# Patient Record
Sex: FEMALE | Race: WHITE | NOT HISPANIC OR LATINO | Employment: OTHER | URBAN - NONMETROPOLITAN AREA
[De-identification: names, ages, dates, MRNs, and addresses within clinical notes are randomized per-mention and may not be internally consistent; named-entity substitution may affect disease eponyms.]

---

## 2017-02-28 ENCOUNTER — OFFICE VISIT (OUTPATIENT)
Dept: CARDIOLOGY | Facility: CLINIC | Age: 82
End: 2017-02-28

## 2017-02-28 VITALS
HEIGHT: 64 IN | HEART RATE: 64 BPM | SYSTOLIC BLOOD PRESSURE: 128 MMHG | OXYGEN SATURATION: 98 % | WEIGHT: 109 LBS | BODY MASS INDEX: 18.61 KG/M2 | DIASTOLIC BLOOD PRESSURE: 76 MMHG

## 2017-02-28 DIAGNOSIS — I65.23 BILATERAL CAROTID ARTERY STENOSIS: Primary | ICD-10-CM

## 2017-02-28 DIAGNOSIS — I10 ESSENTIAL HYPERTENSION: ICD-10-CM

## 2017-02-28 DIAGNOSIS — I45.3 TRIFASCICULAR BLOCK: ICD-10-CM

## 2017-02-28 PROCEDURE — 99213 OFFICE O/P EST LOW 20 MIN: CPT | Performed by: PHYSICIAN ASSISTANT

## 2017-02-28 NOTE — PROGRESS NOTES
"Problem list     Subjective   Kelly Uriostegui is a 81 y.o. female     Chief Complaint   Patient presents with   • Follow-up     2-3 mo. echo f/u       HPI  The patient present at our request to review echo and carotid duplex findings.  Echocardiogram indicated preserved systolic function with no significant valvular issues.  No potential source of cardioembolism was noted by that study.  Carotid duplex indicated possibly 50 to his most to 75% stenosis in the right internal carotid artery.  Retrograde flow is noted in the left vertebral artery.  We have seen the patient because of weak episodes in \"stumbling episodes previously.  She also had possible TIA symptoms.  We prompted her to do the above workup, in conjunction with her monitor.  Her monitor was basically unremarkable.  Her baseline EKG had indicated trifascicular block.  We decreased her beta blocker therapy.  Really she feels better since decreasing that.  She has no further complaints otherwise.  She specifically denies chest pain, failure, or further dysrhythmic symptoms.    Current Outpatient Prescriptions   Medication Sig Dispense Refill   • atenolol (TENORMIN) 50 MG tablet Take 1 tablet by mouth Daily. 30 tablet 3   • atorvastatin (LIPITOR) 40 MG tablet Take 1 tablet by mouth daily. 30 tablet 3   • Cholecalciferol (VITAMIN D3) 2000 UNITS tablet Take 1 tablet by mouth daily.     • clopidogrel (PLAVIX) 75 MG tablet Take 1 tablet by mouth daily. 30 tablet 3   • Coenzyme Q10 (COQ-10) 200 MG capsule Take 1 tablet by mouth daily. 30 each 3   • docusate sodium (COLACE) 100 MG capsule Take 100 mg by mouth 2 (two) times a day.     • fenofibrate (TRICOR) 48 MG tablet Take 1 tablet by mouth daily. 30 tablet 3   • ferrous sulfate 325 (65 FE) MG tablet Take 325 mg by mouth daily with breakfast.     • folic acid (FOLVITE) 1 MG tablet Take 1 mg by mouth daily.     • losartan (COZAAR) 50 MG tablet Take 1 tablet by mouth 2 (Two) Times a Day. 60 tablet 2   • Multiple " "Vitamins-Minerals (ICAPS) capsule Take 1 capsule by mouth 2 (two) times a day.     • pantoprazole (PROTONIX) 40 MG EC tablet Take 1 tablet by mouth daily.     • pentoxifylline (TRENTal) 400 MG CR tablet Take 400 mg by mouth 2 (two) times a day.     • triamterene-hydrochlorothiazide (MAXZIDE-25) 37.5-25 MG per tablet Take 1 tablet by mouth daily. 30 tablet 3   • aspirin 81 MG EC tablet Take 1 tablet by mouth daily. 30 tablet 3     No current facility-administered medications for this visit.        Kiwi extract and Penicillins    Past Medical History   Diagnosis Date   • Coronary artery disease    • Hyperlipidemia    • Hypertension        Social History     Social History   • Marital status: Unknown     Spouse name: N/A   • Number of children: N/A   • Years of education: N/A     Occupational History   • Not on file.     Social History Main Topics   • Smoking status: Former Smoker   • Smokeless tobacco: Never Used   • Alcohol use No   • Drug use: No   • Sexual activity: Defer     Other Topics Concern   • Not on file     Social History Narrative       Family History   Problem Relation Age of Onset   • Stroke Mother    • Hypertension Mother    • Hyperlipidemia Mother    • Heart disease Father    • Other Father      AAA       Review of Systems   Constitutional: Positive for fatigue.   HENT: Negative.    Eyes: Positive for visual disturbance (wears glasses).   Respiratory: Negative.    Cardiovascular: Negative.    Gastrointestinal: Negative.    Endocrine: Negative.    Genitourinary: Negative.    Musculoskeletal: Positive for arthralgias and myalgias.   Skin: Negative.    Allergic/Immunologic: Negative.    Neurological: Positive for dizziness.   Hematological: Bruises/bleeds easily.   Psychiatric/Behavioral: Negative.        Objective   Visit Vitals   • /76 (BP Location: Left arm, Patient Position: Sitting)   • Pulse 64   • Ht 64\" (162.6 cm)   • Wt 109 lb (49.4 kg)   • SpO2 98%   • BMI 18.71 kg/m2     Lab Results " (most recent)     None        Physical Exam   Constitutional: She is oriented to person, place, and time. She appears well-developed and well-nourished. No distress.   HENT:   Head: Normocephalic and atraumatic.   Eyes: EOM are normal. Pupils are equal, round, and reactive to light.   Neck: No JVD present. Carotid bruit is present (Bilat carotid bruits).   Cardiovascular: Normal rate and regular rhythm.  Exam reveals no gallop and no friction rub.    Murmur heard.   Systolic (Second RICS to LLSB) murmur is present with a grade of 2/6   Pulmonary/Chest: Effort normal and breath sounds normal. No respiratory distress. She has no wheezes. She has no rales. She exhibits no tenderness.   Musculoskeletal: Normal range of motion. She exhibits no edema.   Neurological: She is alert and oriented to person, place, and time. No cranial nerve deficit.   Skin: Skin is warm and dry. No rash noted. No erythema. No pallor.   Psychiatric: She has a normal mood and affect. Her behavior is normal.   Nursing note and vitals reviewed.        Procedure   Procedures       Assessment/Plan      Diagnosis Plan   1. Bilateral carotid artery stenosis  CT Angiogram Carotids    Basic Metabolic Panel    CT Angiogram Carotids    Basic Metabolic Panel   2. Trifascicular block  Basic Metabolic Panel    Basic Metabolic Panel   3. Essential hypertension  Basic Metabolic Panel    Basic Metabolic Panel       I would like to schedule for CTA of the carotids given prior TIA symptoms and significant disease the right internal carotid artery as outlined above and has questioned by carotid duplex.  She will need a BMP prior to that study.  We'll see her back after the above and recommend further to her at that time.  I have asked that she decreased the atenolol further from 50 mg a day to half a tab daily.  I'm concerned about prior symptoms which may represent bradycardia dysrhythmic issues and her trifascicular block by EKG.  She will monitor heart rate  and blood pressure, calling us for any issues.

## 2017-03-01 DIAGNOSIS — I10 ESSENTIAL HYPERTENSION: Primary | ICD-10-CM

## 2017-03-20 ENCOUNTER — TELEPHONE (OUTPATIENT)
Dept: CARDIOLOGY | Facility: CLINIC | Age: 82
End: 2017-03-20

## 2017-03-20 NOTE — TELEPHONE ENCOUNTER
----- Message from Lesia Roman sent at 3/20/2017  3:38 PM EDT -----  Pt had testing done on kidneys last week at [facility] and would like to know those results.

## 2017-03-20 NOTE — TELEPHONE ENCOUNTER
Briefly discussed CTA carotid results with patient. According to last office note patient was to f/u here at the office after CTA done. [facility]KE[facility] Cade staff scheduling appt. CHANELLE,LPN

## 2017-04-06 ENCOUNTER — OFFICE VISIT (OUTPATIENT)
Dept: CARDIOLOGY | Facility: CLINIC | Age: 82
End: 2017-04-06

## 2017-04-06 VITALS
BODY MASS INDEX: 18.88 KG/M2 | HEART RATE: 61 BPM | DIASTOLIC BLOOD PRESSURE: 58 MMHG | WEIGHT: 110.6 LBS | SYSTOLIC BLOOD PRESSURE: 118 MMHG | OXYGEN SATURATION: 97 % | HEIGHT: 64 IN

## 2017-04-06 DIAGNOSIS — I10 ESSENTIAL HYPERTENSION: ICD-10-CM

## 2017-04-06 DIAGNOSIS — I65.23 BILATERAL CAROTID ARTERY STENOSIS: Primary | ICD-10-CM

## 2017-04-06 DIAGNOSIS — I77.1 SUBCLAVIAN ARTERY STENOSIS, LEFT (HCC): ICD-10-CM

## 2017-04-06 PROCEDURE — 99213 OFFICE O/P EST LOW 20 MIN: CPT | Performed by: PHYSICIAN ASSISTANT

## 2017-04-06 NOTE — PROGRESS NOTES
Problem list     Subjective   Kelly Uriostegui is a 81 y.o. female     Chief Complaint   Patient presents with   • Follow-up     patient presents as a follow up       HPI  The patient presents back for evaluation today to follow-up on CTA of the carotids.  She was scheduled for that because of an abnormal carotid duplex.  CT of the carotids suggested 60% left carotid stenosis, 50% right carotid stenosis, left subclavian artery occlusion with reconstitution via the left vertebral artery, and ulcerative plaques noted in the aortic arch.  The patient initially had workup given TIA type symptoms.  Currently, the patient does have some degree of dizziness.  She has no syncope.  She has no significant left upper extremity claudication symptoms.  She relates to no chest pain currently.  She has chronic but stable dyspnea.  She relates to no PND or orthopnea.  She has no further complaints otherwise.    Current Outpatient Prescriptions   Medication Sig Dispense Refill   • aspirin 81 MG EC tablet Take 1 tablet by mouth daily. 30 tablet 3   • atenolol (TENORMIN) 50 MG tablet Take 1 tablet by mouth Daily. (Patient taking differently: Take 25 mg by mouth Daily. 1/2 tablet daily) 30 tablet 3   • atorvastatin (LIPITOR) 40 MG tablet Take 1 tablet by mouth daily. 30 tablet 3   • Cholecalciferol (VITAMIN D3) 2000 UNITS tablet Take 1 tablet by mouth daily.     • clopidogrel (PLAVIX) 75 MG tablet Take 1 tablet by mouth daily. 30 tablet 3   • Coenzyme Q10 (COQ-10) 200 MG capsule Take 1 tablet by mouth daily. 30 each 3   • docusate sodium (COLACE) 100 MG capsule Take 100 mg by mouth 2 (two) times a day.     • fenofibrate (TRICOR) 48 MG tablet Take 1 tablet by mouth daily. 30 tablet 3   • ferrous sulfate 325 (65 FE) MG tablet Take 325 mg by mouth daily with breakfast.     • folic acid (FOLVITE) 1 MG tablet Take 1 mg by mouth daily.     • losartan (COZAAR) 50 MG tablet Take 1 tablet by mouth 2 (Two) Times a Day. 60 tablet 2   • Multiple  "Vitamins-Minerals (ICAPS) capsule Take 1 capsule by mouth 2 (two) times a day.     • pantoprazole (PROTONIX) 40 MG EC tablet Take 1 tablet by mouth daily.     • pentoxifylline (TRENTal) 400 MG CR tablet Take 400 mg by mouth 2 (two) times a day.     • triamterene-hydrochlorothiazide (MAXZIDE-25) 37.5-25 MG per tablet Take 1 tablet by mouth daily. 30 tablet 3     No current facility-administered medications for this visit.        Kiwi extract and Penicillins    Past Medical History:   Diagnosis Date   • Coronary artery disease    • Hyperlipidemia    • Hypertension        Social History     Social History   • Marital status: Unknown     Spouse name: N/A   • Number of children: N/A   • Years of education: N/A     Occupational History   • Not on file.     Social History Main Topics   • Smoking status: Former Smoker   • Smokeless tobacco: Never Used   • Alcohol use No   • Drug use: No   • Sexual activity: Defer     Other Topics Concern   • Not on file     Social History Narrative       Family History   Problem Relation Age of Onset   • Stroke Mother    • Hypertension Mother    • Hyperlipidemia Mother    • Heart disease Father    • Other Father      AAA       Review of Systems   Constitutional: Positive for fatigue.   HENT: Positive for sinus pressure.    Eyes: Positive for visual disturbance (glasses ).   Respiratory: Negative.    Cardiovascular: Negative.    Gastrointestinal: Negative.    Endocrine: Negative.    Genitourinary: Negative.    Musculoskeletal: Positive for arthralgias and myalgias (leg went numb, pain in foot).   Skin: Negative.    Allergic/Immunologic: Positive for environmental allergies.   Neurological: Positive for numbness (in leg).   Hematological: Bruises/bleeds easily.   Psychiatric/Behavioral: The patient is nervous/anxious.        Objective   /58 (BP Location: Left arm, Patient Position: Sitting)  Pulse 61  Ht 64\" (162.6 cm)  Wt 110 lb 9.6 oz (50.2 kg)  SpO2 97%  BMI 18.98 kg/m2  Lab " Results (most recent)     None        Physical Exam   Constitutional: She is oriented to person, place, and time. She appears well-developed and well-nourished. No distress.   HENT:   Head: Normocephalic and atraumatic.   Eyes: EOM are normal. Pupils are equal, round, and reactive to light.   Neck: No JVD present. Carotid bruit is present (Bilat carotid bruits).   Cardiovascular: Normal rate and regular rhythm.  Exam reveals no gallop and no friction rub.    Murmur heard.   Systolic (Second RICS to LLSB) murmur is present with a grade of 2/6   Pulses:       Radial pulses are 1+ on the left side.   Pulmonary/Chest: Effort normal and breath sounds normal. No respiratory distress. She has no wheezes. She has no rales. She exhibits no tenderness.   Musculoskeletal: Normal range of motion. She exhibits no edema.   Neurological: She is alert and oriented to person, place, and time. No cranial nerve deficit.   Skin: Skin is warm and dry. No rash noted. No erythema. No pallor.   Psychiatric: She has a normal mood and affect. Her behavior is normal.   Nursing note and vitals reviewed.        Procedure   Procedures       Assessment/Plan      Diagnosis Plan   1. Bilateral carotid artery stenosis     2. Subclavian artery stenosis, left     3. Essential hypertension       The patient presents, as above, to follow-up on CTA of the carotids.  She had findings as above.  She has subclavian stenosis with reconstitution via the left vertebral artery, again all as above.  She has minimal symptoms of subclavian still at this time.  I will continue antiplatelet therapy and her lipid medications/statin.  We discussed consideration for CT surgery evaluation.  I advised do not feel that she has a target for intervention.  She would like to consider this nonetheless.  She will call back if she decides to pursue with that.  Further pending above.  Nothing further for now as we will treat her medically at this time.

## 2017-04-27 ENCOUNTER — TELEPHONE (OUTPATIENT)
Dept: CARDIOLOGY | Facility: CLINIC | Age: 82
End: 2017-04-27

## 2017-04-27 DIAGNOSIS — I65.23 BILATERAL CAROTID ARTERY STENOSIS: Primary | ICD-10-CM

## 2017-04-27 DIAGNOSIS — I77.1 SUBCLAVIAN ARTERY STENOSIS (HCC): ICD-10-CM

## 2017-04-27 NOTE — TELEPHONE ENCOUNTER
----- Message from Chelo Perez sent at 4/27/2017 11:17 AM EDT -----  Contact: PATIENT  THE PATIENT STATES TAYA TALKED TO HER ABOUT SEEING A SUGEON.  SHE DOESN'T REMEMBER WHO HE SUGGESTED AND WANTS TO MOVE FORWARD WITH THAT REFERRAL.  SHE CAN BE REACHED [phone].  THANKS      Patient desires to proceed with referral to Dr. Carranza in [place].

## 2017-05-03 ENCOUNTER — TRANSCRIBE ORDERS (OUTPATIENT)
Dept: CARDIAC SURGERY | Facility: CLINIC | Age: 82
End: 2017-05-03

## 2017-05-03 DIAGNOSIS — I65.23 CAROTID STENOSIS, BILATERAL: Primary | ICD-10-CM

## 2017-05-22 ENCOUNTER — OFFICE VISIT (OUTPATIENT)
Dept: CARDIAC SURGERY | Facility: CLINIC | Age: 82
End: 2017-05-22

## 2017-05-22 VITALS
BODY MASS INDEX: 18.44 KG/M2 | TEMPERATURE: 96.9 F | DIASTOLIC BLOOD PRESSURE: 90 MMHG | SYSTOLIC BLOOD PRESSURE: 120 MMHG | HEIGHT: 64 IN | WEIGHT: 108 LBS | OXYGEN SATURATION: 97 % | HEART RATE: 76 BPM

## 2017-05-22 DIAGNOSIS — I77.1 SUBCLAVIAN ARTERY STENOSIS, LEFT (HCC): Primary | ICD-10-CM

## 2017-05-22 DIAGNOSIS — I65.22 STENOSIS OF LEFT CAROTID ARTERY: ICD-10-CM

## 2017-05-22 PROCEDURE — 99205 OFFICE O/P NEW HI 60 MIN: CPT | Performed by: THORACIC SURGERY (CARDIOTHORACIC VASCULAR SURGERY)

## 2017-05-22 RX ORDER — LORATADINE 10 MG/1
10 TABLET ORAL DAILY
COMMUNITY

## 2017-06-26 ENCOUNTER — OFFICE VISIT (OUTPATIENT)
Dept: CARDIOLOGY | Facility: CLINIC | Age: 82
End: 2017-06-26

## 2017-06-26 VITALS
BODY MASS INDEX: 18.74 KG/M2 | HEIGHT: 64 IN | OXYGEN SATURATION: 97 % | DIASTOLIC BLOOD PRESSURE: 70 MMHG | HEART RATE: 72 BPM | WEIGHT: 109.8 LBS | SYSTOLIC BLOOD PRESSURE: 119 MMHG

## 2017-06-26 DIAGNOSIS — I77.1 SUBCLAVIAN ARTERY STENOSIS, LEFT (HCC): Primary | ICD-10-CM

## 2017-06-26 DIAGNOSIS — I10 ESSENTIAL HYPERTENSION: ICD-10-CM

## 2017-06-26 DIAGNOSIS — I65.22 STENOSIS OF LEFT CAROTID ARTERY: ICD-10-CM

## 2017-06-26 PROCEDURE — 99213 OFFICE O/P EST LOW 20 MIN: CPT | Performed by: PHYSICIAN ASSISTANT

## 2017-06-26 RX ORDER — RANITIDINE 150 MG/1
150 TABLET ORAL 2 TIMES DAILY
COMMUNITY
Start: 2017-05-24

## 2017-06-26 NOTE — PROGRESS NOTES
Problem list     Subjective   Kelly Uriostegui is a 82 y.o. female     Chief Complaint   Patient presents with   • Follow-up     patient appears in office today for follow up        HPI  The patient presents back for follow-up after follow-up of CT surgery evaluation.  She is scheduled for that because of left subclavian artery occlusion with subclavian steal presentation.  She had nonobstructive carotid artery stenosis (up to 60%) by CTA.  She did see CT surgery who felt that she was candidate for bypass of the left subclavian artery.  The patient was advised to call back should she decide to proceed with the same.  She does note that she is discussed this with her daughter and has decided to proceed on with revascularization as her symptoms of subclavian still are very significant.  The patient has no current chest pain.  She has stable dyspnea.  She relates to no PND orthopnea.  She has no further complaints otherwise.    Current Outpatient Prescriptions   Medication Sig Dispense Refill   • aspirin 81 MG EC tablet Take 1 tablet by mouth daily. 30 tablet 3   • atenolol (TENORMIN) 50 MG tablet Take 1 tablet by mouth Daily. (Patient taking differently: Take 25 mg by mouth Daily. 1/2 tablet daily) 30 tablet 3   • atorvastatin (LIPITOR) 40 MG tablet Take 1 tablet by mouth daily. 30 tablet 3   • Cholecalciferol (VITAMIN D3) 2000 UNITS tablet Take 1 tablet by mouth daily.     • clopidogrel (PLAVIX) 75 MG tablet Take 1 tablet by mouth daily. 30 tablet 3   • Coenzyme Q10 (COQ-10) 200 MG capsule Take 1 tablet by mouth daily. 30 each 3   • docusate sodium (COLACE) 100 MG capsule Take 100 mg by mouth 2 (two) times a day.     • fenofibrate (TRICOR) 48 MG tablet Take 1 tablet by mouth daily. 30 tablet 3   • ferrous sulfate 325 (65 FE) MG tablet Take 325 mg by mouth daily with breakfast.     • folic acid (FOLVITE) 1 MG tablet Take 1 mg by mouth daily.     • loratadine (CLARITIN) 10 MG tablet Take 10 mg by mouth Daily.     •  losartan (COZAAR) 50 MG tablet Take 1 tablet by mouth 2 (Two) Times a Day. 60 tablet 2   • Multiple Vitamins-Minerals (ICAPS) capsule Take 1 capsule by mouth 2 (two) times a day.     • pentoxifylline (TRENTal) 400 MG CR tablet Take 400 mg by mouth 2 (two) times a day.     • raNITIdine (ZANTAC) 150 MG tablet 2 (Two) Times a Day.     • triamterene-hydrochlorothiazide (MAXZIDE-25) 37.5-25 MG per tablet Take 1 tablet by mouth daily. 30 tablet 3     No current facility-administered medications for this visit.        Kiwi extract and Penicillins    Past Medical History:   Diagnosis Date   • Carotid stenosis    • Coronary artery disease    • GERD (gastroesophageal reflux disease)    • Hyperlipidemia    • Hypertension    • Macular degeneration    • Thyroid disease    • TIA (transient ischemic attack)        Social History     Social History   • Marital status: Unknown     Spouse name: N/A   • Number of children: 2   • Years of education: N/A     Occupational History   •       Retired     Social History Main Topics   • Smoking status: Current Every Day Smoker     Packs/day: 0.50     Years: 45.00     Types: Cigarettes   • Smokeless tobacco: Never Used   • Alcohol use No   • Drug use: No   • Sexual activity: Defer     Other Topics Concern   • Not on file     Social History Narrative       Family History   Problem Relation Age of Onset   • Stroke Mother    • Hypertension Mother    • Hyperlipidemia Mother    • Heart disease Father    • Other Father      AAA       Review of Systems   Constitutional: Negative for fatigue.   HENT: Negative.    Eyes: Positive for visual disturbance (wears glasses daily).   Respiratory: Positive for shortness of breath (on exertion). Negative for cough, chest tightness and wheezing.    Cardiovascular: Negative.  Negative for chest pain, palpitations and leg swelling.   Gastrointestinal: Negative.  Negative for abdominal pain, nausea and vomiting.   Endocrine: Negative.  Negative for  "cold intolerance, heat intolerance and polyuria.   Genitourinary: Negative.  Negative for difficulty urinating, frequency and urgency.   Musculoskeletal: Negative.  Negative for arthralgias, back pain, myalgias, neck pain and neck stiffness.   Skin: Negative.  Negative for rash and wound.   Allergic/Immunologic: Positive for environmental allergies (seasonal allegies) and food allergies (kiwi).   Neurological: Negative.  Negative for dizziness, weakness, light-headedness and headaches.   Hematological: Bruises/bleeds easily (brusies and bleeds easily).   Psychiatric/Behavioral: Negative.  Negative for agitation, confusion and sleep disturbance. The patient is not nervous/anxious.        Objective   /70 (BP Location: Left arm, Patient Position: Sitting)  Pulse 72  Ht 64\" (162.6 cm)  Wt 109 lb 12.8 oz (49.8 kg)  SpO2 97%  BMI 18.85 kg/m2  Lab Results (most recent)     None        Physical Exam   Constitutional: She is oriented to person, place, and time. She appears well-developed and well-nourished. No distress.   HENT:   Head: Normocephalic and atraumatic.   Eyes: EOM are normal. Pupils are equal, round, and reactive to light.   Neck: No JVD present. Carotid bruit is present (Bilat carotid bruits).   Cardiovascular: Normal rate and regular rhythm.  Exam reveals no gallop and no friction rub.    Murmur heard.   Systolic (Second RICS to LLSB) murmur is present with a grade of 2/6   Pulses:       Radial pulses are 1+ on the left side.   Pulmonary/Chest: Effort normal and breath sounds normal. No respiratory distress. She has no wheezes. She has no rales. She exhibits no tenderness.   Musculoskeletal: Normal range of motion. She exhibits no edema.   Neurological: She is alert and oriented to person, place, and time. No cranial nerve deficit.   Skin: Skin is warm and dry. No rash noted. No erythema. No pallor.   Psychiatric: She has a normal mood and affect. Her behavior is normal.   Nursing note and vitals " reviewed.        Procedure   Procedures       Assessment/Plan      Diagnosis Plan   1. Subclavian artery stenosis, left     2. Stenosis of left carotid artery     3. Essential hypertension       The patient is pending revascularization of left subclavian artery stenosis with subclavian steal symptoms, all as above.  Nothing further from cardiac standpoint at this time.  We will see her back in 2 months at which point she will hopefully have revascularization finalize.  Nothing further otherwise at this time.

## 2017-06-28 ENCOUNTER — PREP FOR SURGERY (OUTPATIENT)
Dept: OTHER | Facility: HOSPITAL | Age: 82
End: 2017-06-28

## 2017-06-28 DIAGNOSIS — I77.1 SUBCLAVIAN ARTERY STENOSIS, LEFT (HCC): Primary | ICD-10-CM

## 2017-06-28 DIAGNOSIS — G45.8 SUBCLAVIAN STEAL SYNDROME: Primary | ICD-10-CM

## 2017-06-28 DIAGNOSIS — R73.9 HYPERGLYCEMIA: ICD-10-CM

## 2017-06-29 RX ORDER — CHLORHEXIDINE GLUCONATE 500 MG/1
1 CLOTH TOPICAL EVERY 12 HOURS PRN
Status: CANCELLED | OUTPATIENT
Start: 2017-07-10

## 2017-07-10 ENCOUNTER — HOSPITAL ENCOUNTER (OUTPATIENT)
Dept: GENERAL RADIOLOGY | Facility: HOSPITAL | Age: 82
Discharge: HOME | End: 2017-07-10
Admitting: PHYSICIAN ASSISTANT

## 2017-07-10 ENCOUNTER — APPOINTMENT (OUTPATIENT)
Dept: PREADMISSION TESTING | Facility: HOSPITAL | Age: 82
End: 2017-07-10

## 2017-07-10 VITALS — HEIGHT: 64 IN | WEIGHT: 106.48 LBS | BODY MASS INDEX: 18.18 KG/M2

## 2017-07-10 DIAGNOSIS — R73.9 HYPERGLYCEMIA: ICD-10-CM

## 2017-07-10 DIAGNOSIS — G45.8 SUBCLAVIAN STEAL SYNDROME: ICD-10-CM

## 2017-07-10 LAB
ABO GROUP BLD: NORMAL
ANION GAP SERPL CALCULATED.3IONS-SCNC: 12 MMOL/L (ref 3–11)
BLD GP AB SCN SERPL QL: NEGATIVE
BUN BLD-MCNC: 35 MG/DL (ref 9–23)
BUN/CREAT SERPL: 18.4 (ref 7–25)
CALCIUM SPEC-SCNC: 9.6 MG/DL (ref 8.7–10.4)
CHLORIDE SERPL-SCNC: 102 MMOL/L (ref 99–109)
CO2 SERPL-SCNC: 24 MMOL/L (ref 20–31)
CREAT BLD-MCNC: 1.9 MG/DL (ref 0.6–1.3)
DEPRECATED RDW RBC AUTO: 54.2 FL (ref 37–54)
ERYTHROCYTE [DISTWIDTH] IN BLOOD BY AUTOMATED COUNT: 15.9 % (ref 11.3–14.5)
GFR SERPL CREATININE-BSD FRML MDRD: 25 ML/MIN/1.73
GLUCOSE BLD-MCNC: 89 MG/DL (ref 70–100)
HBA1C MFR BLD: 5.6 % (ref 4.8–5.6)
HCT VFR BLD AUTO: 34.7 % (ref 34.5–44)
HGB BLD-MCNC: 11 G/DL (ref 11.5–15.5)
INR PPP: 0.97
MCH RBC QN AUTO: 29.6 PG (ref 27–31)
MCHC RBC AUTO-ENTMCNC: 31.7 G/DL (ref 32–36)
MCV RBC AUTO: 93.3 FL (ref 80–99)
PLATELET # BLD AUTO: 255 10*3/MM3 (ref 150–450)
PMV BLD AUTO: 11.6 FL (ref 6–12)
POTASSIUM BLD-SCNC: 4.8 MMOL/L (ref 3.5–5.5)
PROTHROMBIN TIME: 10.6 SECONDS (ref 9.6–11.5)
RBC # BLD AUTO: 3.72 10*6/MM3 (ref 3.89–5.14)
RH BLD: NEGATIVE
SODIUM BLD-SCNC: 138 MMOL/L (ref 132–146)
WBC NRBC COR # BLD: 7.92 10*3/MM3 (ref 3.5–10.8)

## 2017-07-10 PROCEDURE — 93010 ELECTROCARDIOGRAM REPORT: CPT | Performed by: INTERNAL MEDICINE

## 2017-07-10 RX ORDER — UBIDECARENONE 100 MG
100 CAPSULE ORAL DAILY
COMMUNITY
End: 2017-07-15 | Stop reason: HOSPADM

## 2017-07-10 RX ORDER — ACETAMINOPHEN 500 MG
500 TABLET ORAL EVERY 6 HOURS PRN
COMMUNITY
End: 2017-07-15 | Stop reason: HOSPADM

## 2017-07-11 ENCOUNTER — APPOINTMENT (OUTPATIENT)
Dept: NEUROLOGY | Facility: HOSPITAL | Age: 82
End: 2017-07-11

## 2017-07-11 ENCOUNTER — ANESTHESIA EVENT (OUTPATIENT)
Dept: PERIOP | Facility: HOSPITAL | Age: 82
End: 2017-07-11

## 2017-07-11 ENCOUNTER — HOSPITAL ENCOUNTER (INPATIENT)
Facility: HOSPITAL | Age: 82
LOS: 4 days | Discharge: HOME | End: 2017-07-15
Attending: THORACIC SURGERY (CARDIOTHORACIC VASCULAR SURGERY) | Admitting: THORACIC SURGERY (CARDIOTHORACIC VASCULAR SURGERY)

## 2017-07-11 ENCOUNTER — ANESTHESIA (OUTPATIENT)
Dept: PERIOP | Facility: HOSPITAL | Age: 82
End: 2017-07-11

## 2017-07-11 ENCOUNTER — APPOINTMENT (OUTPATIENT)
Dept: CT IMAGING | Facility: HOSPITAL | Age: 82
End: 2017-07-11

## 2017-07-11 DIAGNOSIS — Z74.09 IMPAIRED FUNCTIONAL MOBILITY, BALANCE, GAIT, AND ENDURANCE: Primary | ICD-10-CM

## 2017-07-11 DIAGNOSIS — I77.1 SUBCLAVIAN ARTERY STENOSIS, LEFT (HCC): ICD-10-CM

## 2017-07-11 DIAGNOSIS — Z78.9 IMPAIRED MOBILITY AND ADLS: ICD-10-CM

## 2017-07-11 DIAGNOSIS — R41.841 COGNITIVE COMMUNICATION DEFICIT: ICD-10-CM

## 2017-07-11 DIAGNOSIS — G45.8 SUBCLAVIAN STEAL SYNDROME: ICD-10-CM

## 2017-07-11 DIAGNOSIS — R13.13 PHARYNGEAL DYSPHAGIA: ICD-10-CM

## 2017-07-11 DIAGNOSIS — Z74.09 IMPAIRED MOBILITY AND ADLS: ICD-10-CM

## 2017-07-11 PROBLEM — J44.9 COPD (CHRONIC OBSTRUCTIVE PULMONARY DISEASE) (HCC): Status: ACTIVE | Noted: 2017-07-11

## 2017-07-11 PROBLEM — E03.9 HYPOTHYROIDISM: Status: ACTIVE | Noted: 2017-07-11

## 2017-07-11 PROBLEM — I63.9 CVA (CEREBRAL VASCULAR ACCIDENT) (HCC): Status: ACTIVE | Noted: 2017-07-11

## 2017-07-11 PROBLEM — Z72.0 TOBACCO ABUSE: Status: ACTIVE | Noted: 2017-07-11

## 2017-07-11 PROBLEM — E78.5 HYPERLIPIDEMIA: Status: ACTIVE | Noted: 2017-07-11

## 2017-07-11 PROBLEM — I10 HYPERTENSION: Status: ACTIVE | Noted: 2017-07-11

## 2017-07-11 PROBLEM — I65.29 CAROTID ARTERY STENOSIS: Status: ACTIVE | Noted: 2017-07-11

## 2017-07-11 PROBLEM — K21.9 GERD (GASTROESOPHAGEAL REFLUX DISEASE): Status: ACTIVE | Noted: 2017-07-11

## 2017-07-11 LAB
ABO GROUP BLD: NORMAL
GLUCOSE BLDC GLUCOMTR-MCNC: 151 MG/DL (ref 70–130)
GLUCOSE BLDC GLUCOMTR-MCNC: 160 MG/DL (ref 70–130)
RH BLD: NEGATIVE
TSH SERPL DL<=0.05 MIU/L-ACNC: 2.94 MIU/ML (ref 0.35–5.35)

## 2017-07-11 PROCEDURE — 25010000002 ONDANSETRON PER 1 MG: Performed by: NURSE ANESTHETIST, CERTIFIED REGISTERED

## 2017-07-11 PROCEDURE — 25010000002 DEXAMETHASONE PER 1 MG: Performed by: NURSE ANESTHETIST, CERTIFIED REGISTERED

## 2017-07-11 PROCEDURE — 70496 CT ANGIOGRAPHY HEAD: CPT

## 2017-07-11 PROCEDURE — 35301 RECHANNELING OF ARTERY: CPT | Performed by: THORACIC SURGERY (CARDIOTHORACIC VASCULAR SURGERY)

## 2017-07-11 PROCEDURE — 25010000002 FENTANYL CITRATE (PF) 100 MCG/2ML SOLUTION: Performed by: NURSE ANESTHETIST, CERTIFIED REGISTERED

## 2017-07-11 PROCEDURE — C1768 GRAFT, VASCULAR: HCPCS | Performed by: THORACIC SURGERY (CARDIOTHORACIC VASCULAR SURGERY)

## 2017-07-11 PROCEDURE — 25010000002 NEOSTIGMINE PER 0.5 MG: Performed by: NURSE ANESTHETIST, CERTIFIED REGISTERED

## 2017-07-11 PROCEDURE — 92610 EVALUATE SWALLOWING FUNCTION: CPT

## 2017-07-11 PROCEDURE — 25010000002 PHENYLEPHRINE PER 1 ML: Performed by: NURSE ANESTHETIST, CERTIFIED REGISTERED

## 2017-07-11 PROCEDURE — 25010000002 MORPHINE SULFATE (PF) 2 MG/ML SOLUTION: Performed by: THORACIC SURGERY (CARDIOTHORACIC VASCULAR SURGERY)

## 2017-07-11 PROCEDURE — 88311 DECALCIFY TISSUE: CPT | Performed by: THORACIC SURGERY (CARDIOTHORACIC VASCULAR SURGERY)

## 2017-07-11 PROCEDURE — 03CL0ZZ EXTIRPATION OF MATTER FROM LEFT INTERNAL CAROTID ARTERY, OPEN APPROACH: ICD-10-PCS | Performed by: THORACIC SURGERY (CARDIOTHORACIC VASCULAR SURGERY)

## 2017-07-11 PROCEDURE — 0042T HC CT CEREBRAL PERFUSION W/WO CONTRAST: CPT

## 2017-07-11 PROCEDURE — 25010000002 PROTAMINE SULFATE PER 10 MG: Performed by: NURSE ANESTHETIST, CERTIFIED REGISTERED

## 2017-07-11 PROCEDURE — S0260 H&P FOR SURGERY: HCPCS | Performed by: THORACIC SURGERY (CARDIOTHORACIC VASCULAR SURGERY)

## 2017-07-11 PROCEDURE — 25010000002 HEPARIN (PORCINE) PER 1000 UNITS: Performed by: THORACIC SURGERY (CARDIOTHORACIC VASCULAR SURGERY)

## 2017-07-11 PROCEDURE — 70498 CT ANGIOGRAPHY NECK: CPT

## 2017-07-11 PROCEDURE — 25010000002 VANCOMYCIN PER 500 MG: Performed by: PHYSICIAN ASSISTANT

## 2017-07-11 PROCEDURE — 0 IOPAMIDOL PER 1 ML: Performed by: THORACIC SURGERY (CARDIOTHORACIC VASCULAR SURGERY)

## 2017-07-11 PROCEDURE — 99223 1ST HOSP IP/OBS HIGH 75: CPT | Performed by: INTERNAL MEDICINE

## 2017-07-11 PROCEDURE — 03UL0JZ SUPPLEMENT LEFT INTERNAL CAROTID ARTERY WITH SYNTHETIC SUBSTITUTE, OPEN APPROACH: ICD-10-PCS | Performed by: THORACIC SURGERY (CARDIOTHORACIC VASCULAR SURGERY)

## 2017-07-11 PROCEDURE — 031J0JK BYPASS LEFT COMMON CAROTID ARTERY TO LEFT EXTRACRANIAL ARTERY WITH SYNTHETIC SUBSTITUTE, OPEN APPROACH: ICD-10-PCS | Performed by: THORACIC SURGERY (CARDIOTHORACIC VASCULAR SURGERY)

## 2017-07-11 PROCEDURE — 86900 BLOOD TYPING SEROLOGIC ABO: CPT

## 2017-07-11 PROCEDURE — 25010000002 HEPARIN (PORCINE) PER 1000 UNITS: Performed by: NURSE ANESTHETIST, CERTIFIED REGISTERED

## 2017-07-11 PROCEDURE — 25010000002 PROPOFOL 10 MG/ML EMULSION: Performed by: NURSE ANESTHETIST, CERTIFIED REGISTERED

## 2017-07-11 PROCEDURE — 95955 EEG DURING SURGERY: CPT

## 2017-07-11 PROCEDURE — 35606 BPG CAROTID-SUBCLAVIAN: CPT | Performed by: THORACIC SURGERY (CARDIOTHORACIC VASCULAR SURGERY)

## 2017-07-11 PROCEDURE — 86901 BLOOD TYPING SEROLOGIC RH(D): CPT

## 2017-07-11 PROCEDURE — 95816 EEG AWAKE AND DROWSY: CPT

## 2017-07-11 PROCEDURE — 82962 GLUCOSE BLOOD TEST: CPT

## 2017-07-11 PROCEDURE — 88304 TISSUE EXAM BY PATHOLOGIST: CPT | Performed by: THORACIC SURGERY (CARDIOTHORACIC VASCULAR SURGERY)

## 2017-07-11 PROCEDURE — 84443 ASSAY THYROID STIM HORMONE: CPT | Performed by: NURSE PRACTITIONER

## 2017-07-11 DEVICE — VASCU-GUARD PERIPHERAL VASCULAR PATCH IS PREPARED FROM BOVINE PERICARDIUM WHICH IS CROSS-LINKED WITH GLUTARALDEHYDE. THE PERICARDIUM IS PROCURED FROM CATTLE ORIGINATING IN THE UNITED STATES. VASCU-GUARD PERIPHERAL VASCULAR PATCH IS CHEMICALLY STERILIZED USING ETHANOL AND PROPYLENE OXIDE. VASCU-GUARD PERIPHERAL VASCULAR PATCH HAS BEEN TREATED WITH 1 MOLAR SODIUM HYDROXIDE FOR A MINIMUM OF 60 MINUTES AT 20 - 25 C.  VASCU-GUARD PERIPHERAL VASCULAR PATCH IS PACKAGED IN A CONTAINER FILLED WITH STERILE, NON-PYROGENIC WATER CONTAINING PROPYLENE OXIDE. THE CONTENTS OF THE UNOPENED, UNDAMAGED CONTAINER ARE STERILE.
Type: IMPLANTABLE DEVICE | Site: CAROTID | Status: FUNCTIONAL
Brand: VASCU-GUARD PERIPHERAL VASCULAR PATCH

## 2017-07-11 DEVICE — GRFT VASC PROPAT THNSTRCH REMVRNG6X50X40: Type: IMPLANTABLE DEVICE | Site: CAROTID | Status: FUNCTIONAL

## 2017-07-11 RX ORDER — THROMBIN HUMAN AND FIBRINOGEN 2; 5.5 [USP'U]/1; MG/1
PATCH TOPICAL AS NEEDED
Status: DISCONTINUED | OUTPATIENT
Start: 2017-07-11 | End: 2017-07-11 | Stop reason: HOSPADM

## 2017-07-11 RX ORDER — LIDOCAINE HYDROCHLORIDE 10 MG/ML
INJECTION, SOLUTION INFILTRATION; PERINEURAL AS NEEDED
Status: DISCONTINUED | OUTPATIENT
Start: 2017-07-11 | End: 2017-07-11 | Stop reason: SURG

## 2017-07-11 RX ORDER — SODIUM CHLORIDE 9 MG/ML
INJECTION, SOLUTION INTRAVENOUS AS NEEDED
Status: DISCONTINUED | OUTPATIENT
Start: 2017-07-11 | End: 2017-07-11 | Stop reason: HOSPADM

## 2017-07-11 RX ORDER — CHLORHEXIDINE GLUCONATE 500 MG/1
1 CLOTH TOPICAL EVERY 12 HOURS PRN
Status: DISCONTINUED | OUTPATIENT
Start: 2017-07-11 | End: 2017-07-13

## 2017-07-11 RX ORDER — GLYCOPYRROLATE 0.2 MG/ML
INJECTION INTRAMUSCULAR; INTRAVENOUS AS NEEDED
Status: DISCONTINUED | OUTPATIENT
Start: 2017-07-11 | End: 2017-07-11 | Stop reason: SURG

## 2017-07-11 RX ORDER — FAMOTIDINE 20 MG/1
20 TABLET, FILM COATED ORAL 2 TIMES DAILY
Status: DISCONTINUED | OUTPATIENT
Start: 2017-07-11 | End: 2017-07-15 | Stop reason: HOSPADM

## 2017-07-11 RX ORDER — SODIUM CHLORIDE 0.9 % (FLUSH) 0.9 %
1-10 SYRINGE (ML) INJECTION AS NEEDED
Status: DISCONTINUED | OUTPATIENT
Start: 2017-07-11 | End: 2017-07-15 | Stop reason: HOSPADM

## 2017-07-11 RX ORDER — LOSARTAN POTASSIUM 50 MG/1
50 TABLET ORAL 2 TIMES DAILY
Status: DISCONTINUED | OUTPATIENT
Start: 2017-07-11 | End: 2017-07-15 | Stop reason: HOSPADM

## 2017-07-11 RX ORDER — FOLIC ACID 1 MG/1
1 TABLET ORAL DAILY
Status: DISCONTINUED | OUTPATIENT
Start: 2017-07-11 | End: 2017-07-15 | Stop reason: HOSPADM

## 2017-07-11 RX ORDER — ACETAMINOPHEN 500 MG
500 TABLET ORAL EVERY 6 HOURS PRN
Status: DISCONTINUED | OUTPATIENT
Start: 2017-07-11 | End: 2017-07-12 | Stop reason: ALTCHOICE

## 2017-07-11 RX ORDER — ATRACURIUM BESYLATE 10 MG/ML
INJECTION, SOLUTION INTRAVENOUS AS NEEDED
Status: DISCONTINUED | OUTPATIENT
Start: 2017-07-11 | End: 2017-07-11 | Stop reason: SURG

## 2017-07-11 RX ORDER — SODIUM CHLORIDE 9 MG/ML
INJECTION, SOLUTION INTRAVENOUS CONTINUOUS PRN
Status: DISCONTINUED | OUTPATIENT
Start: 2017-07-11 | End: 2017-07-11 | Stop reason: SURG

## 2017-07-11 RX ORDER — SODIUM CHLORIDE 450 MG/100ML
30 INJECTION, SOLUTION INTRAVENOUS CONTINUOUS
Status: DISCONTINUED | OUTPATIENT
Start: 2017-07-11 | End: 2017-07-15 | Stop reason: HOSPADM

## 2017-07-11 RX ORDER — PROPOFOL 10 MG/ML
VIAL (ML) INTRAVENOUS AS NEEDED
Status: DISCONTINUED | OUTPATIENT
Start: 2017-07-11 | End: 2017-07-11 | Stop reason: SURG

## 2017-07-11 RX ORDER — ONDANSETRON 2 MG/ML
4 INJECTION INTRAMUSCULAR; INTRAVENOUS ONCE AS NEEDED
Status: DISCONTINUED | OUTPATIENT
Start: 2017-07-11 | End: 2017-07-11 | Stop reason: HOSPADM

## 2017-07-11 RX ORDER — ASPIRIN 81 MG/1
81 TABLET ORAL DAILY
Status: DISCONTINUED | OUTPATIENT
Start: 2017-07-11 | End: 2017-07-15 | Stop reason: HOSPADM

## 2017-07-11 RX ORDER — FENTANYL CITRATE 50 UG/ML
50 INJECTION, SOLUTION INTRAMUSCULAR; INTRAVENOUS
Status: DISCONTINUED | OUTPATIENT
Start: 2017-07-11 | End: 2017-07-11 | Stop reason: HOSPADM

## 2017-07-11 RX ORDER — HEPARIN SODIUM 1000 [USP'U]/ML
INJECTION, SOLUTION INTRAVENOUS; SUBCUTANEOUS AS NEEDED
Status: DISCONTINUED | OUTPATIENT
Start: 2017-07-11 | End: 2017-07-11 | Stop reason: SURG

## 2017-07-11 RX ORDER — LIDOCAINE HYDROCHLORIDE 10 MG/ML
INJECTION, SOLUTION EPIDURAL; INFILTRATION; INTRACAUDAL; PERINEURAL AS NEEDED
Status: DISCONTINUED | OUTPATIENT
Start: 2017-07-11 | End: 2017-07-11 | Stop reason: HOSPADM

## 2017-07-11 RX ORDER — SODIUM CHLORIDE 0.9 % (FLUSH) 0.9 %
1-10 SYRINGE (ML) INJECTION AS NEEDED
Status: DISCONTINUED | OUTPATIENT
Start: 2017-07-11 | End: 2017-07-13

## 2017-07-11 RX ORDER — MORPHINE SULFATE 2 MG/ML
2 INJECTION, SOLUTION INTRAMUSCULAR; INTRAVENOUS EVERY 4 HOURS PRN
Status: DISCONTINUED | OUTPATIENT
Start: 2017-07-11 | End: 2017-07-13

## 2017-07-11 RX ORDER — DOCUSATE SODIUM 100 MG/1
100 CAPSULE, LIQUID FILLED ORAL 2 TIMES DAILY
Status: DISCONTINUED | OUTPATIENT
Start: 2017-07-11 | End: 2017-07-15 | Stop reason: HOSPADM

## 2017-07-11 RX ORDER — HYDROCODONE BITARTRATE AND ACETAMINOPHEN 5; 325 MG/1; MG/1
1 TABLET ORAL EVERY 6 HOURS PRN
Status: DISCONTINUED | OUTPATIENT
Start: 2017-07-11 | End: 2017-07-11

## 2017-07-11 RX ORDER — ATENOLOL 50 MG/1
50 TABLET ORAL DAILY
Status: DISCONTINUED | OUTPATIENT
Start: 2017-07-11 | End: 2017-07-11

## 2017-07-11 RX ORDER — MAGNESIUM HYDROXIDE 1200 MG/15ML
LIQUID ORAL AS NEEDED
Status: DISCONTINUED | OUTPATIENT
Start: 2017-07-11 | End: 2017-07-11 | Stop reason: HOSPADM

## 2017-07-11 RX ORDER — SODIUM CHLORIDE 9 MG/ML
9 INJECTION, SOLUTION INTRAVENOUS CONTINUOUS
Status: DISCONTINUED | OUTPATIENT
Start: 2017-07-11 | End: 2017-07-11

## 2017-07-11 RX ORDER — ONDANSETRON 2 MG/ML
INJECTION INTRAMUSCULAR; INTRAVENOUS AS NEEDED
Status: DISCONTINUED | OUTPATIENT
Start: 2017-07-11 | End: 2017-07-11 | Stop reason: SURG

## 2017-07-11 RX ORDER — LIDOCAINE HYDROCHLORIDE 10 MG/ML
0.2 INJECTION, SOLUTION INFILTRATION; PERINEURAL ONCE
Status: COMPLETED | OUTPATIENT
Start: 2017-07-11 | End: 2017-07-11

## 2017-07-11 RX ORDER — PROTAMINE SULFATE 10 MG/ML
INJECTION, SOLUTION INTRAVENOUS AS NEEDED
Status: DISCONTINUED | OUTPATIENT
Start: 2017-07-11 | End: 2017-07-11 | Stop reason: SURG

## 2017-07-11 RX ORDER — TRIAMTERENE AND HYDROCHLOROTHIAZIDE 37.5; 25 MG/1; MG/1
1 TABLET ORAL DAILY
Status: DISCONTINUED | OUTPATIENT
Start: 2017-07-11 | End: 2017-07-15 | Stop reason: HOSPADM

## 2017-07-11 RX ORDER — ATENOLOL 25 MG/1
25 TABLET ORAL ONCE
Status: COMPLETED | OUTPATIENT
Start: 2017-07-11 | End: 2017-07-11

## 2017-07-11 RX ORDER — CLOPIDOGREL BISULFATE 75 MG/1
75 TABLET ORAL DAILY
Status: DISCONTINUED | OUTPATIENT
Start: 2017-07-11 | End: 2017-07-14

## 2017-07-11 RX ORDER — FENTANYL CITRATE 50 UG/ML
INJECTION, SOLUTION INTRAMUSCULAR; INTRAVENOUS AS NEEDED
Status: DISCONTINUED | OUTPATIENT
Start: 2017-07-11 | End: 2017-07-11 | Stop reason: SURG

## 2017-07-11 RX ORDER — ATORVASTATIN CALCIUM 40 MG/1
80 TABLET, FILM COATED ORAL NIGHTLY
Status: DISCONTINUED | OUTPATIENT
Start: 2017-07-11 | End: 2017-07-12

## 2017-07-11 RX ORDER — ATENOLOL 50 MG/1
50 TABLET ORAL DAILY
Status: DISCONTINUED | OUTPATIENT
Start: 2017-07-12 | End: 2017-07-15 | Stop reason: HOSPADM

## 2017-07-11 RX ORDER — ESMOLOL HYDROCHLORIDE 10 MG/ML
INJECTION INTRAVENOUS AS NEEDED
Status: DISCONTINUED | OUTPATIENT
Start: 2017-07-11 | End: 2017-07-11 | Stop reason: SURG

## 2017-07-11 RX ORDER — FAMOTIDINE 20 MG/1
20 TABLET, FILM COATED ORAL ONCE
Status: COMPLETED | OUTPATIENT
Start: 2017-07-11 | End: 2017-07-11

## 2017-07-11 RX ORDER — DEXAMETHASONE SODIUM PHOSPHATE 4 MG/ML
INJECTION, SOLUTION INTRA-ARTICULAR; INTRALESIONAL; INTRAMUSCULAR; INTRAVENOUS; SOFT TISSUE AS NEEDED
Status: DISCONTINUED | OUTPATIENT
Start: 2017-07-11 | End: 2017-07-11 | Stop reason: SURG

## 2017-07-11 RX ADMIN — PROTAMINE SULFATE 25 MG: 10 INJECTION, SOLUTION INTRAVENOUS at 08:35

## 2017-07-11 RX ADMIN — DOCUSATE SODIUM 100 MG: 100 CAPSULE, LIQUID FILLED ORAL at 17:25

## 2017-07-11 RX ADMIN — EPHEDRINE SULFATE 5 MG: 50 INJECTION INTRAMUSCULAR; INTRAVENOUS; SUBCUTANEOUS at 07:38

## 2017-07-11 RX ADMIN — FENTANYL CITRATE 50 MCG: 50 INJECTION, SOLUTION INTRAMUSCULAR; INTRAVENOUS at 07:12

## 2017-07-11 RX ADMIN — ATORVASTATIN CALCIUM 80 MG: 40 TABLET, FILM COATED ORAL at 20:20

## 2017-07-11 RX ADMIN — FOLIC ACID 1 MG: 1 TABLET ORAL at 11:43

## 2017-07-11 RX ADMIN — NICARDIPINE HYDROCHLORIDE 10 MG/HR: 25 INJECTION INTRAVENOUS at 20:20

## 2017-07-11 RX ADMIN — CLOPIDOGREL BISULFATE 75 MG: 75 TABLET ORAL at 11:43

## 2017-07-11 RX ADMIN — DEXAMETHASONE SODIUM PHOSPHATE 8 MG: 4 INJECTION, SOLUTION INTRAMUSCULAR; INTRAVENOUS at 07:33

## 2017-07-11 RX ADMIN — ATRACURIUM BESYLATE 5 MG: 10 INJECTION, SOLUTION INTRAVENOUS at 08:26

## 2017-07-11 RX ADMIN — IOPAMIDOL 115 ML: 755 INJECTION, SOLUTION INTRAVENOUS at 15:55

## 2017-07-11 RX ADMIN — LOSARTAN POTASSIUM 50 MG: 50 TABLET, FILM COATED ORAL at 11:43

## 2017-07-11 RX ADMIN — VANCOMYCIN HYDROCHLORIDE 750 MG: 750 INJECTION, SOLUTION INTRAVENOUS at 17:25

## 2017-07-11 RX ADMIN — MORPHINE SULFATE 2 MG: 2 INJECTION, SOLUTION INTRAMUSCULAR; INTRAVENOUS at 13:22

## 2017-07-11 RX ADMIN — SODIUM CHLORIDE: 9 INJECTION, SOLUTION INTRAVENOUS at 07:06

## 2017-07-11 RX ADMIN — SODIUM CHLORIDE 30 ML/HR: 4.5 INJECTION, SOLUTION INTRAVENOUS at 11:48

## 2017-07-11 RX ADMIN — EPHEDRINE SULFATE 5 MG: 50 INJECTION INTRAMUSCULAR; INTRAVENOUS; SUBCUTANEOUS at 08:16

## 2017-07-11 RX ADMIN — ATRACURIUM BESYLATE 35 MG: 10 INJECTION, SOLUTION INTRAVENOUS at 07:12

## 2017-07-11 RX ADMIN — NICARDIPINE HYDROCHLORIDE 5 MG/HR: 25 INJECTION INTRAVENOUS at 12:18

## 2017-07-11 RX ADMIN — MORPHINE SULFATE 2 MG: 2 INJECTION, SOLUTION INTRAMUSCULAR; INTRAVENOUS at 21:59

## 2017-07-11 RX ADMIN — EPHEDRINE SULFATE 5 MG: 50 INJECTION INTRAMUSCULAR; INTRAVENOUS; SUBCUTANEOUS at 07:23

## 2017-07-11 RX ADMIN — ATENOLOL 25 MG: 25 TABLET ORAL at 06:17

## 2017-07-11 RX ADMIN — LIDOCAINE HYDROCHLORIDE 0.2 ML: 10 INJECTION, SOLUTION INFILTRATION; PERINEURAL at 06:15

## 2017-07-11 RX ADMIN — ONDANSETRON 4 MG: 2 INJECTION INTRAMUSCULAR; INTRAVENOUS at 08:33

## 2017-07-11 RX ADMIN — ATENOLOL 25 MG: 25 TABLET ORAL at 11:44

## 2017-07-11 RX ADMIN — FAMOTIDINE 20 MG: 20 TABLET, FILM COATED ORAL at 05:56

## 2017-07-11 RX ADMIN — FENTANYL CITRATE 50 MCG: 50 INJECTION INTRAMUSCULAR; INTRAVENOUS at 10:10

## 2017-07-11 RX ADMIN — SODIUM CHLORIDE 9 ML/HR: 9 INJECTION, SOLUTION INTRAVENOUS at 06:09

## 2017-07-11 RX ADMIN — PHENYLEPHRINE HYDROCHLORIDE 0.1 MCG/KG/MIN: 10 INJECTION INTRAVENOUS at 07:29

## 2017-07-11 RX ADMIN — HEPARIN SODIUM 5000 UNITS: 1000 INJECTION, SOLUTION INTRAVENOUS; SUBCUTANEOUS at 07:28

## 2017-07-11 RX ADMIN — PROPOFOL 90 MG: 10 INJECTION, EMULSION INTRAVENOUS at 07:12

## 2017-07-11 RX ADMIN — NICARDIPINE HYDROCHLORIDE 10 MG/HR: 25 INJECTION INTRAVENOUS at 17:25

## 2017-07-11 RX ADMIN — LIDOCAINE HYDROCHLORIDE 50 MG: 10 INJECTION, SOLUTION INFILTRATION; PERINEURAL at 07:12

## 2017-07-11 RX ADMIN — HEPARIN SODIUM 2500 UNITS: 1000 INJECTION, SOLUTION INTRAVENOUS; SUBCUTANEOUS at 07:59

## 2017-07-11 RX ADMIN — SODIUM CHLORIDE 9 ML/HR: 9 INJECTION, SOLUTION INTRAVENOUS at 06:15

## 2017-07-11 RX ADMIN — Medication 3 MG: at 08:47

## 2017-07-11 RX ADMIN — FAMOTIDINE 20 MG: 20 TABLET ORAL at 17:25

## 2017-07-11 RX ADMIN — SODIUM CHLORIDE 15 MG/HR: 9 INJECTION, SOLUTION INTRAVENOUS at 07:45

## 2017-07-11 RX ADMIN — ESMOLOL HYDROCHLORIDE 40 MG: 10 INJECTION, SOLUTION INTRAVENOUS at 07:47

## 2017-07-11 RX ADMIN — DOCUSATE SODIUM 100 MG: 100 CAPSULE, LIQUID FILLED ORAL at 11:43

## 2017-07-11 RX ADMIN — ROBINUL 0.4 MG: 0.2 INJECTION INTRAMUSCULAR; INTRAVENOUS at 08:47

## 2017-07-11 RX ADMIN — LOSARTAN POTASSIUM 50 MG: 50 TABLET, FILM COATED ORAL at 20:20

## 2017-07-11 RX ADMIN — VANCOMYCIN HYDROCHLORIDE 750 MG: 1 INJECTION, POWDER, LYOPHILIZED, FOR SOLUTION INTRAVENOUS at 06:32

## 2017-07-11 RX ADMIN — ASPIRIN 81 MG: 81 TABLET, COATED ORAL at 11:43

## 2017-07-11 RX ADMIN — FAMOTIDINE 20 MG: 20 TABLET ORAL at 11:44

## 2017-07-11 RX ADMIN — ROBINUL 0.2 MG: 0.2 INJECTION INTRAMUSCULAR; INTRAVENOUS at 07:40

## 2017-07-11 RX ADMIN — ROBINUL 0.2 MG: 0.2 INJECTION INTRAMUSCULAR; INTRAVENOUS at 08:57

## 2017-07-11 RX ADMIN — LIDOCAINE HYDROCHLORIDE 0.2 ML: 10 INJECTION, SOLUTION INFILTRATION; PERINEURAL at 06:11

## 2017-07-11 RX ADMIN — TRIAMTERENE AND HYDROCHLOROTHIAZIDE 1 TABLET: 37.5; 25 TABLET ORAL at 11:48

## 2017-07-11 RX ADMIN — PROTAMINE SULFATE 25 MG: 10 INJECTION, SOLUTION INTRAVENOUS at 08:31

## 2017-07-11 NOTE — OP NOTE
Operative Report    Preop Diagnosis: Hypertension.  Hyperlipidemia.  Left internal carotid artery stenosis.  Left subclavian artery occlusion.  Left arm pain and syncopal episodes.        Procedure:  1: Left internal carotid endarterectomy                              With pericardial patch closure and                                   EEG monitoring.                         2: Left common carotid to left subclavian artery bypass with 6 mm PTFE graft through a second separate incision    Surgeons: Tyler Carranza M.D. and Usman penny M.D.        Indication: Patient referred with the above listed medical problems of left arm claudication as well as syncopal episodes.  She had separate disease in the left internal carotid artery distal to the bifurcation as well as a left subclavian artery occlusion.  She understood the nature the procedure risk of bleeding infection stroke and graft failure and infection and agreed to proceed        Description: Patient supine position sterile prep and drape and EEG monitoring performed incision made on the left neck anterior sternocleidomastoid and through platysma care was taken to identify the hypoglossal and vagus nerves and not cause damage.  5000 heparin were given the internal/external common carotid arteries were clamped the internal carotid artery was opened and endarterectomized to a smooth surface there was a calcific plaque with hemorrhagic component with 75-80% stenosis.  The pericardial tissue patch was sutured in place with Prolene suture and brisk backbleeding was noted from the internal carotid patch was flushed free of air and debris.  At this point we then turned our attention to the second operation EA left common carotid artery to left subclavian artery bypass for an occluded subclavian artery.  The left common carotid artery was identified at the base of the neck and chest separate incision performed also the left subclavian artery was identified with the  incision just over the left clavicle.  Additional heparin was given and a 6 mm PTFE graft was sutured to the base of the common carotid artery with a running 6-0 Prolene suture.  And no deleterious EEG changes were encountered and it was flushed in the appropriate fashion.  We then sutured the distal end of the PTFE graft to the left subclavian artery.  Good hemostasis was obtained a strong pulse was palpated in the left radial artery and the incisions closed with multiple layers of suture and the sponge and needle count reported as correct.      Please note that portions of this note were completed with a voice recognition program. Efforts were made to edit the dictations, but occasionally words are mistranscribed.

## 2017-07-11 NOTE — THERAPY EVALUATION
Acute Care - Speech Language Pathology   Swallow Initial Evaluation [facility]   Clinical Swallow Evaluation     Patient Name: Kelly Uriostegui  : 1935  MRN: [id number]  Today's Date: 2017               Admit Date: 2017    Visit Dx:     ICD-10-CM ICD-9-CM   1. Subclavian steal syndrome G45.8 435.2   2. Subclavian artery stenosis, left I77.1 447.1     Patient Active Problem List   Diagnosis   • Essential hypertension   • Coronary artery disease involving native coronary artery of native heart without angina pectoris   • Palpitations   • Transient cerebral ischemia   • Carotid artery stenosis   • Carotid bruit   • Subclavian artery stenosis, left   • Subclavian steal syndrome   • Hyperlipidemia   • Hypertension   • Hypothyroidism   • GERD (gastroesophageal reflux disease)   • Tobacco abuse   • COPD (chronic obstructive pulmonary disease)   • H/O CVA (cerebral vascular accident)   • Bilateral Carotid Artery Stenosis     Past Medical History:   Diagnosis Date   • Anemia    • Carotid stenosis    • Coronary artery disease    • CVA (cerebral vascular accident)    • GERD (gastroesophageal reflux disease)    • Hyperlipidemia    • Hypertension    • Hypothyroidism    • Macular degeneration      Past Surgical History:   Procedure Laterality Date   • CARDIAC CATHETERIZATION     • COLONOSCOPY      5-10 years ago   • EYE SURGERY      bilateral cataracts removed   • GALLBLADDER SURGERY     • HYSTERECTOMY     • THYROID SURGERY            SWALLOW EVALUATION (last 72 hours)      Swallow Evaluation       17 1630                Rehab Evaluation    Document Type evaluation  -SM        Subjective Information no complaints  -SM        General Information    Patient Profile Review yes  -SM        Subjective Patient Observations Alert and cooperative  -SM        Pertinent History Of Current Problem Code 19 called post-op, w/u pending  -SM        Current Diet Limitations NPO  -SM        Prior Level of Function-  Swallowing safe, efficient swallowing in all situations  -        Plans/Goals Discussed With patient and family;agreed upon  -        Barriers to Rehab none identified  -        Clinical Impression    Patient's Goals For Discharge patient did not state  -        SLP Diet Recommendation regular textures;thin liquids  -        Recommended Feeding/Eating Techniques maintain upright posture during/after eating for 30 mins  -        SLP Rec. for Method of Medication Administration meds whole with thin liquid  -        Pain Assessment    Pain Assessment No/denies pain  -        Clinical Swallow Exam    Oral Phase Results intact oral phase without signs of dysfunction  -        Pharyngeal Phase Results no signs/symptoms of pharyngeal impairment  -        Summary of Clinical Exam Swallow Eval completed. Swallow WFL, no s/s aspiration. Safe with regular diet and thin liquids. Will f/u for s/l eval tomorrow  -          User Key  (r) = Recorded By, (t) = Taken By, (c) = Cosigned By    Initials Name Effective Dates     Irma Nj, MS CCC-SLP 06/22/15 -         EDUCATION  The patient has been educated in the following areas:   Dysphagia (Swallowing Impairment).    SLP Recommendation and Plan     SLP Diet Recommendation: regular textures, thin liquids  Recommended Feeding/Eating Techniques: maintain upright posture during/after eating for 30 mins  SLP Rec. for Method of Medication Administration: meds whole with thin liquid           Plan of Care Review  Plan Of Care Reviewed With: patient, daughter  Outcome Summary/Follow up Plan: Swallow Eval completed. Swallow WFL, no s/s aspiration. Safe with regular diet and thin liquids. Will f/u for s/l eval tomorrow             Time Calculation:         Time Calculation- SLP       07/11/17 1655          Time Calculation- [facility]    SLP Start Time 1630  -      SLP Received On 07/11/17  -        User Key  (r) = Recorded By, (t) = Taken By, (c) = Cosigned By     Initials Name Provider Type     Irma Nj, MS CCC-SLP Speech and Language Pathologist          Therapy Charges for Today     Code Description Service Date Service Provider Modifiers Qty    [id number] HC ST EVAL ORAL PHARYNG SWALLOW 2 7/11/2017 Irma Nj, MS CCC-SLP GN 1               Irma Nj MS CCC-SLP  7/11/2017

## 2017-07-11 NOTE — H&P
"  Patient Care Team:      Chief complaint: Dizziness and falling     Subjective:This patient was evaluated by Dr. Carranza on 5/22/2017 for both left sided carotid artery stenosis and left subclavian artery occlusion. This patient has had some episodes of dizziness and she has fallen on a number of occasions; says she always tends to lean to the right side. CT angiogram had demonstrated 60% left internal carotid artery stenosis but occlusion of the left subclavian artery and subclavian steal syndrome on the left. The patient denies significant left arm claudication and the patient is right-handed. It should be noted the blood pressure differential is 60 mmHg S in the left arm when compared to the right. The patient's daught states that she has been falling, consistent with potential subclavian steal syndrome and dizziness. No sx of a true stroke.    Review of Systems:  General ROS: negative for - chills, fatigue, fever or malaise  Cardiovascular ROS: no chest pain or dyspnea on exertion  Respiratory ROS: no cough, shortness of breath, or wheezing      Allergies:   Allergies   Allergen Reactions   • Kiwi Extract      \" chest pressure\"   • Penicillins Rash          Latex: Denies  Contrast Dye: Denies    Home Meds    Prescriptions Prior to Admission   Medication Sig Dispense Refill Last Dose   • acetaminophen (TYLENOL) 500 MG tablet Take 500 mg by mouth Every 6 (Six) Hours As Needed for Mild Pain .   7/10/2017 at 2100   • aspirin 81 MG EC tablet Take 1 tablet by mouth daily. 30 tablet 3 7/10/2017 at Unknown time   • atenolol (TENORMIN) 50 MG tablet Take 1 tablet by mouth Daily. (Patient taking differently: Take 50 mg by mouth Daily. 1/2 tablet daily) 30 tablet 3 7/10/2017 at 2100   • Cholecalciferol (VITAMIN D3) 2000 UNITS tablet Take 1 tablet by mouth daily.   7/10/2017 at Unknown time   • clopidogrel (PLAVIX) 75 MG tablet Take 1 tablet by mouth daily. 30 tablet 3 7/10/2017 at Unknown time   • coenzyme Q10 100 MG " "capsule Take 100 mg by mouth Daily.   7/10/2017 at 2100   • docusate sodium (COLACE) 100 MG capsule Take 100 mg by mouth 2 (two) times a day.   7/10/2017 at 2100   • folic acid (FOLVITE) 1 MG tablet Take 1 mg by mouth daily.   7/10/2017 at Unknown time   • losartan (COZAAR) 50 MG tablet Take 1 tablet by mouth 2 (Two) Times a Day. (Patient taking differently: Take 50 mg by mouth Daily.) 60 tablet 2 7/10/2017 at Unknown time   • triamterene-hydrochlorothiazide (MAXZIDE-25) 37.5-25 MG per tablet Take 1 tablet by mouth daily. 30 tablet 3 7/10/2017 at Unknown time   • loratadine (CLARITIN) 10 MG tablet Take 10 mg by mouth Daily.   Unknown at Unknown time   • Multiple Vitamins-Minerals (ICAPS) capsule Take 1 capsule by mouth 2 (two) times a day.   Unknown at Unknown time   • raNITIdine (ZANTAC) 150 MG tablet Take 150 mg by mouth 2 (Two) Times a Day.   Unknown at Unknown time     PMH:   Past Medical History:   Diagnosis Date   • Anemia    • Carotid stenosis    • Coronary artery disease    • GERD (gastroesophageal reflux disease)    • Hyperlipidemia    • Hypertension    • Macular degeneration    • Thyroid disease    • TIA (transient ischemic attack)    • Wears dentures    • Wears glasses      PSH:    Past Surgical History:   Procedure Laterality Date   • CARDIAC CATHETERIZATION     • COLONOSCOPY      5-10 years ago   • EYE SURGERY      bilateral cataracts removed   • GALLBLADDER SURGERY     • HYSTERECTOMY     • THYROID SURGERY       Immunization History: pneumo: UTD Flu: 201 6 Tetanus: Unknown    Social History:   Tobacco: Current every day smoker 0.5 ppd X 45 + years   Alcohol: No      Physical Exam:BP (!) 208/76  Pulse 59  Temp 96.1 °F (35.6 °C) (Temporal Artery )   Resp 18  Ht 64\" (162.6 cm)  Wt 106 lb (48.1 kg)  SpO2 97%  Breastfeeding? No  BMI 18.19 kg/m2  Visit Vitals   • BP (!) 208/76   • Pulse 59   • Temp 96.1 °F (35.6 °C) (Temporal Artery )   • Resp 18   • Ht 64\" (162.6 cm)   • Wt 106 lb (48.1 kg)   • SpO2 " 97%   • Breastfeeding No   • BMI 18.19 kg/m2       General Appearance:    Alert, cooperative, no distress, appears stated age   Head:    Normocephalic, without obvious abnormality, atraumatic   Lungs:     Clear to auscultation bilaterally, respirations unlabored    Heart: Regular rate and rhythm, S1 and S2 normal, no murmur, rub    or gallop    Abdomen:    Soft without tenderness   Breast Exam:    deferred   Genitalia:    deferred   Extremities:   Extremities normal, atraumatic, no cyanosis or edema   Skin:   Skin color, texture, turgor normal, no rashes or lesions   Neurologic:   Grossly intact     Results Review:  CT scan reports  Reviewed by Dr. Carranza documenting left subclavian occlusion and 60% left carotid stenosis. The patient's daughter confirms that she has had dizzy spells and episodes for some time now.      Results for YESENIA BECKETT (MRN [id number]) as of 7/11/2017 06:52   Ref. Range 7/10/2017 14:19   Glucose Latest Ref Range: 70 - 100 mg/dL 89   Sodium Latest Ref Range: 132 - 146 mmol/L 138   Potassium Latest Ref Range: 3.5 - 5.5 mmol/L 4.8   CO2 Latest Ref Range: 20.0 - 31.0 mmol/L 24.0   Chloride Latest Ref Range: 99 - 109 mmol/L 102   Anion Gap Latest Ref Range: 3.0 - 11.0 mmol/L 12.0 (H)   Creatinine Latest Ref Range: 0.60 - 1.30 mg/dL 1.90 (H)   BUN Latest Ref Range: 9 - 23 mg/dL 35 (H)   BUN/Creatinine Ratio Latest Ref Range: 7.0 - 25.0  18.4   Calcium Latest Ref Range: 8.7 - 10.4 mg/dL 9.6   eGFR Non African Amer Latest Ref Range: >60 mL/min/1.73 25 (L)   Hemoglobin A1C Latest Ref Range: 4.80 - 5.60 % 5.60   Protime Latest Ref Range: 9.6 - 11.5 Seconds 10.6   INR Unknown 0.97   WBC Latest Ref Range: 3.50 - 10.80 10*3/mm3 7.92   RBC Latest Ref Range: 3.89 - 5.14 10*6/mm3 3.72 (L)   Hemoglobin Latest Ref Range: 11.5 - 15.5 g/dL 11.0 (L)   Hematocrit Latest Ref Range: 34.5 - 44.0 % 34.7   RDW Latest Ref Range: 11.3 - 14.5 % 15.9 (H)   MCV Latest Ref Range: 80.0 - 99.0 fL 93.3   MCH Latest  Ref Range: 27.0 - 31.0 pg 29.6   MCHC Latest Ref Range: 32.0 - 36.0 g/dL 31.7 (L)   MPV Latest Ref Range: 6.0 - 12.0 fL 11.6   Platelets Latest Ref Range: 150 - 450 10*3/mm3 255   RDW-SD Latest Ref Range: 37.0 - 54.0 fl 54.2 (H)          Assessment/Plan:  Per Dr. Carranza's note of 5/22/2017: Symptomatic subclavian steal syndrome with episodes of dizziness.       Plan: I met with the patient and her 5 family members in preop today and again reviewed the procedure.  They understand this procedure has with it a risk of stroke bleeding infection death understand and agree to proceed.  They understand that I believe the left internal carotid lesion is significant.  More so than the initial CT demonstrated.  We will proceed with a left internal carotid endarterectomy but with combination left carotid to left subclavian bypass.  They are understanding of this are aware of the risk, and agree to proceed.                                      HEBERT Howard 7/11/2017 6:42 AM

## 2017-07-11 NOTE — PLAN OF CARE
Problem: Patient Care Overview (Adult)  Goal: Plan of Care Review  Outcome: Ongoing (interventions implemented as appropriate)    07/11/17 0540 07/11/17 1300   Coping/Psychosocial Response Interventions   Plan Of Care Reviewed With --  patient;family   Patient Care Overview   Progress improving --        Goal: Adult Individualization and Mutuality  Outcome: Ongoing (interventions implemented as appropriate)    Problem: Perioperative Period (Adult)  Goal: Signs and Symptoms of Listed Potential Problems Will be Absent or Manageable (Perioperative Period)  Outcome: Ongoing (interventions implemented as appropriate)

## 2017-07-11 NOTE — ANESTHESIA PROCEDURE NOTES
Airway  Urgency: elective    Airway not difficult    General Information and Staff    Patient location during procedure: OR  CRNA: HALIE FAJARDO    Indications and Patient Condition  Indications for airway management: airway protection    Preoxygenated: yes  MILS not maintained throughout  Mask difficulty assessment: 1 - vent by mask    Final Airway Details  Final airway type: endotracheal airway      Successful airway: ETT  Cuffed: yes   Successful intubation technique: direct laryngoscopy  Facilitating devices/methods: intubating stylet  Endotracheal tube insertion site: oral  Blade: Samantha  Blade size: #3  ETT size: 7.0 mm  Cormack-Lehane Classification: grade I - full view of glottis  Placement verified by: chest auscultation and capnometry   Measured from: lips  ETT to lips (cm): 20  Number of attempts at approach: 1    Additional Comments  Negative epigastric sounds, Breath sound equal bilaterally with symmetric chest rise and fall

## 2017-07-11 NOTE — PLAN OF CARE
Problem: Patient Care Overview (Adult)  Goal: Adult Individualization and Mutuality  Outcome: Ongoing (interventions implemented as appropriate)    07/11/17 0541   Individualization   Patient Specific Preferences none   Patient Specific Goals none   Patient Specific Interventions none   Mutuality/Individual Preferences   What Anxieties, Fears or Concerns Do You Have About Your Health or Care? none   What Questions Do You Have About Your Health or Care? none   What Information Would Help Us Give You More Personalized Care? none

## 2017-07-11 NOTE — PLAN OF CARE
Problem: Patient Care Overview (Adult)  Goal: Plan of Care Review  Outcome: Ongoing (interventions implemented as appropriate)    07/11/17 4520   Coping/Psychosocial Response Interventions   Plan Of Care Reviewed With patient;daughter   Outcome Evaluation   Outcome Summary/Follow up Plan Swallow Eval completed. Swallow WFL, no s/s aspiration. Safe with regular diet and thin liquids. Will f/u for s/l eval tomorrow

## 2017-07-11 NOTE — ANESTHESIA PROCEDURE NOTES
Arterial Line    Patient location during procedure: pre-op  Start time: 7/11/2017 6:20 AM   Performed By   Anesthesiologist: ESME HORTA  Preanesthetic Checklist  Completed: patient identified, site marked, surgical consent, pre-op evaluation, timeout performed, IV checked, risks and benefits discussed and monitors and equipment checked  Arterial Line Prep   Sterile Tech: cap, gloves, sterile barriers and mask  Prep: ChloraPrep  Patient monitoring: blood pressure monitoring, continuous pulse oximetry and EKG  Arterial Line Procedure   Laterality:right  Location:  radial artery  Catheter size: 20 G   Guidance: palpation technique  Number of attempts: 1  Successful placement: yes          Post Assessment   Dressing Type: occlusive dressing applied, secured with tape and wrist guard applied.   Complications no  Circ/Move/Sens Assessment: normal.   Patient Tolerance: patient tolerated the procedure well with no apparent complications

## 2017-07-11 NOTE — ANESTHESIA POSTPROCEDURE EVALUATION
Patient: Kelly Uriostegui    Procedure Summary     Date Anesthesia Start Anesthesia Stop Room / Location    07/11/17 0706 0916 [facility] / [facility]       Procedure Diagnosis Surgeon Provider    LEFT CAROTID SUBCLAVIAN BYPASS (Left Neck) Subclavian artery stenosis, left  (Subclavian artery stenosis, left [I77.1]) MD Dolly Baron MD          Anesthesia Type: general  Last vitals  /50 (07/11/17 0915)    Temp 97.3 °F (36.3 °C) (07/11/17 0915)    Pulse 57 (07/11/17 0915)   Resp 16 (07/11/17 0915)    SpO2 97 % (07/11/17 0915)      Post Anesthesia Care and Evaluation    Patient location during evaluation: PACU  Patient participation: waiting for patient participation  Level of consciousness: sleepy but conscious  Pain score: 0  Pain management: adequate  Airway patency: patent  Anesthetic complications: No anesthetic complications  PONV Status: none  Cardiovascular status: hemodynamically stable and acceptable  Respiratory status: nonlabored ventilation, acceptable, nasal cannula and oral airway  Hydration status: acceptable

## 2017-07-11 NOTE — PLAN OF CARE
Problem: Patient Care Overview (Adult)  Goal: Plan of Care Review  Outcome: Ongoing (interventions implemented as appropriate)    07/11/17 0591   Coping/Psychosocial Response Interventions   Plan Of Care Reviewed With patient   Patient Care Overview   Progress improving

## 2017-07-11 NOTE — PROGRESS NOTES
INTENSIVIST   PROGRESS NOTE     Hospital:  LOS: 0 days     Ms. Kelly Uriostegui, 82 y.o. female is followed for:      Subclavian steal syndrome    Hypertension    As an Intensivist, we provide an integrated approach to the ICU patient and family, medical management of comorbid conditions, lead interdisciplinary rounds and coordinate the care with all other services, including those from other specialists.     Subjective   S     Mrs. Uriostegui is an 83 y/o WF who was admitted today for an elective left internal carotid endarterectomy and left common carotid to left subclavian artery bypass by Dr. Carranza today.  She is a smoker of 1/2 PPD. She states she is trying to quit smoking.  She is not a diabetic.     Interval History:  POD: Day of Surgery  Patient with complaints of left shoulder post op pain and sore throat.  Currently, she is alert and appropriate. She is on a cardene gtt for blood pressure control.  She is tolerating ice chips.        PMH: She  has a past medical history of Anemia; Carotid stenosis; Coronary artery disease; CVA (cerebral vascular accident); GERD (gastroesophageal reflux disease); Hyperlipidemia; Hypertension; Hypothyroidism; and Macular degeneration.   PSxH: She  has a past surgical history that includes Thyroid surgery; Hysterectomy; Cardiac catheterization; Gallbladder surgery; Colonoscopy; and Eye surgery.      Medications:    Current Outpatient Prescriptions on File Prior to Encounter   Medication Sig   • aspirin 81 MG EC tablet Take 1 tablet by mouth daily.   • atenolol (TENORMIN) 50 MG tablet Take 1 tablet by mouth Daily. (Patient taking differently: Take 50 mg by mouth Daily. 1/2 tablet daily)   • Cholecalciferol (VITAMIN D3) 2000 UNITS tablet Take 1 tablet by mouth daily.   • clopidogrel (PLAVIX) 75 MG tablet Take 1 tablet by mouth daily.   • docusate sodium (COLACE) 100 MG capsule Take 100 mg by mouth 2 (two) times a day.   • folic acid (FOLVITE) 1 MG tablet Take 1 mg by mouth  daily.   • losartan (COZAAR) 50 MG tablet Take 1 tablet by mouth 2 (Two) Times a Day. (Patient taking differently: Take 50 mg by mouth Daily.)   • triamterene-hydrochlorothiazide (MAXZIDE-25) 37.5-25 MG per tablet Take 1 tablet by mouth daily.   • loratadine (CLARITIN) 10 MG tablet Take 10 mg by mouth Daily.   • Multiple Vitamins-Minerals (ICAPS) capsule Take 1 capsule by mouth 2 (two) times a day.   • raNITIdine (ZANTAC) 150 MG tablet Take 150 mg by mouth 2 (Two) Times a Day.       Allergies: She is allergic to kiwi extract and penicillins.   FH: Her family history includes Heart disease in her father; Hyperlipidemia in her mother; Hypertension in her mother; Other in her father; Stroke in her mother.   SH: She  reports that she has been smoking Cigarettes.  She has a 22.50 pack-year smoking history. She has never used smokeless tobacco. She reports that she does not drink alcohol or use illicit drugs.     The patient's relevant past medical, surgical and social history were reviewed and updated in Epic as appropriate.      ROS:   As per Dr. Carranza's H+P done 7/11/2017    Objective   O     Vitals:    Temp  Min: 96.1 °F (35.6 °C)  Max: 97.3 °F (36.3 °C)  BP  Min: 107/60  Max: 208/76  Pulse  Min: 57  Max: 60  Resp  Min: 16  Max: 18  SpO2  Min: 95 %  Max: 99 % Flow (L/min)  Min: 2  Max: 4    Medications (drips):    niCARdipine Last Rate: 5 mg/hr (07/11/17 1149)   phenylephrine (SLIME-SYNEPHRINE) 50 mg/250 (0.2 mg/mL) infusion    sodium chloride Last Rate: 30 mL/hr (07/11/17 1148)     Physical Examination  Telemetry:  Sinus Rhythm: sinus bradycardia   Constitutional:  No acute distress.   HEENT: Normocephalic and atraumatic.  Left neck incision, dressing has moderate sanguinous drainage from penrose drain   Cardiovascular: Normal rate, regular and rhythm. Normal heart sounds.  No murmurs, gallop or rub.  No peripheral edema.   Respiratory: No respiratory distress. Normal respiratory effort.  Slightly expiratory wheezes  bilaterally   Abdominal:  Soft. No masses. Non-tender. No distension. No HSM.   Extremities: No digital cyanosis. No clubbing.   Neurological:   Alert and Oriented to person, place, and time.  Best Eye Response: 4-->(E4) spontaneous  Best Motor Response: 6-->(M6) obeys commands  Best Verbal Response: 5-->(V5) oriented  Bernardo Coma Scale Score: 15   Left sided mild facial droop.   Psychiatric:  Normal affect. Normal behavior.   Lines/Drains/Airways: Rt radial arterial line, PIV x2, left neck penrose drain       Results from last 7 days  Lab Units 07/10/17  1419   WBC 10*3/mm3 7.92   HEMOGLOBIN g/dL 11.0*   MCV fL 93.3   PLATELETS 10*3/mm3 255       Results from last 7 days  Lab Units 07/10/17  1419   SODIUM mmol/L 138   POTASSIUM mmol/L 4.8   CO2 mmol/L 24.0   CREATININE mg/dL 1.90*     Estimated Creatinine Clearance: 17.3 mL/min (by C-G formula based on Cr of 1.9).         CXR 7/10/17 Emphysema, no infiltrates    Results: Reviewed.  I reviewed the patient's new laboratory and imaging results.  I independently reviewed the patient's new images.    Medications: Reviewed.    Assessment/Plan   A / P     82 y.o.female, admitted on 7/11/2017 with Subclavian artery stenosis, left [I77.1]  Subclavian steal syndrome [G45.8]:     1. Left sided carotid artery stenois and left Subclavian Steal Syndrome (symptomatic: falling)  Procedure(s) (LRB):  LEFT CAROTID ENDARTERCTOMY WITH PATCH, LEFT CAROTID SUBCLAVIAN BYPASS (Left)   Dr. Tyler Carranza (Cardiothoracic Surgery)  7/11/2017  2. Bilateral Carotid Artery Stenosis  1. CTA Neck 3/2017 revealed 60% stenosis Rt ICA, 50% stenosis Lt ICA  3. H/O CVA  1. Parietal (unknown side) per Cardiology record  2. On Plavix  4. Hyperlipidemia  1. Not on a statin  5. HTN  1. Atenolol, Cozaar, Maxzide at home  6. COPD  1. Tobacco Abuse - Last cigarette Sat 7/9/2017  7. GERD  8. Hypothyroidism  1. Not on replacement  9. No history of DM    Lab Value Date/Time   HGBA1C 5.60 07/10/2017 1419       Nutrition:   Diet Regular; Consistent Carbohydrate, Cardiac  Advance Directives: Full Code    Assessment / Plan:    1. ICU medical management post surgery.  2. BP control with parenteral antihypertensives.  3. Check TSH  4. Lipid panel in am  5. Pain control  6. Tobacco counseling.  7. Stress Ulcer Prophylaxis  8. VTE prophylaxis     HEBERT Abad, BETHP-BC, FNP-BC  Pulmonary and Critical Care Service     Plan of care and goals reviewed during interdisciplinary rounds.  I discussed the patient's findings and my recommendations with patient and family    I have personally seen, interviewed and examined the patient and verified all the key components of the history, physical examination, assessment and plan with HEBERT Abad, FERCHO-BC, FNP-BC and reviewed the note, which reflects my changes and contributions.    Yonatan Malave MD, FACP, FCCP, [facility]    Intensive Care Medicine and Pulmonary Medicine

## 2017-07-11 NOTE — PLAN OF CARE
Problem: Skin Integrity Impairment, Risk/Actual (Adult)  Goal: Identify Related Risk Factors and Signs and Symptoms  Outcome: Ongoing (interventions implemented as appropriate)    07/11/17 1314   Skin Integrity Impairment, Risk/Actual   Skin Integrity Impairment, Risk/Actual: Related Risk Factors age extremes       Goal: Skin Integrity/Wound Healing  Outcome: Ongoing (interventions implemented as appropriate)

## 2017-07-11 NOTE — PLAN OF CARE
Problem: Perioperative Period (Adult)  Goal: Signs and Symptoms of Listed Potential Problems Will be Absent or Manageable (Perioperative Period)  Outcome: Ongoing (interventions implemented as appropriate)    07/11/17 1030   Perioperative Period   Problems Assessed (Perioperative Period) all   Problems Present (Perioperative Period) none

## 2017-07-11 NOTE — NURSING NOTE
Stroke Navigator CODE 19    HPI    Kelly Uriostegui is a 82 y.o.  female on whom I am evaluating as a Code 19 for a possible acute stroke.  Patient had a left CEA with patch and a left carotid subclavian bypass performed today by Dr. Carranza.  She has a history of a previous stroke.    Code 19 location: In House    · Last known well: 15:30 although it was reported to me by unit nurse Annette Allred patient came back from surgery with a mild left facial droop at 10:30 and she received in report that Dr. Carranza was aware and did not want any further intervention at that time per PACU nurse.    · Symptom onset: 15:30  · GCS: 15  · Baseline level of function known yes; mRS 0  · Current symptoms include; extremity weakness, facial droop, ataxic and dysarthria, affecting primarily the left face and upper extremity. Symptoms are currently unchanged.   · Patient is not a candidate for thrombolytic therapy due to CEA today  · Patient is not a candidate for Neuro Intervention due to no LVO (large vessel occlusion) present.    Stroke risk factors: carotid stenosis and hyperlipidemia.     Prior stroke history: yes left posterior parietal   Antiplatelet therapy: Plavix 75mg, Aspirin 81mg  Anticoagulation: none     · Imaging performed: CTA head, CTA neck and CT perfusion per Dr. Kolby Bennett    NIHSS    1a. Level Of Consciousness: 0-->Alert: keenly responsive  1b. LOC Questions: 0-->Answers both questions correctly  1c. LOC Commands: 0-->Performs both tasks correctly  2. Best Gaze: 0-->Normal  3. Visual: 0-->No visual loss  4. Facial Palsy: 1-->Minor paralysis (flattened nasolabial fold, asymmetry on smiling)  5a. Motor Arm, Left: 1-->Drift: limb holds 90 (or 45) degrees, but drifts down before full 10 seconds: does not hit bed or other support  5b. Motor Arm, Right: 0-->No drift: limb holds 90 (or 45) degrees for full 10 secs  6a. Motor Leg, Left: 0-->No drift: leg holds 30 degree position for full 5 secs  6b. Motor Leg, Right:  0-->No drift: leg holds 30 degree position for full 5 secs  7. Limb Ataxia: 1-->Present in one limb  8. Sensory: 0-->Normal: no sensory loss  9. Best Language: 0-->No aphasia: normal  10. Dysarthria: 1-->Mild-to-moderate dysarthria: patient slurs at least some words and, at worst, can be understood with some difficulty  11. Extinction and Inattention (formerly Neglect): 0-->No abnormality    Total (NIH Stroke Scale): 4        PLAN    **Patient currently in the ICU  **Stroke order set utilized: TIA/Ischemic Stroke Admission (Without Thrombolytic Therapy)  **Consults include: neurology    **Medications: Patient is currently on aspirin 81 mg orally every day and clopidogrel 75 mg orally every day.  I will add Lipitor 80 mg per stroke order set.        Marcela Lawrence RN EXTENDER/NAVIGATOR

## 2017-07-11 NOTE — ANESTHESIA PREPROCEDURE EVALUATION
Anesthesia Evaluation     Patient summary reviewed and Nursing notes reviewed   no history of anesthetic complications:  NPO Solid Status: > 8 hours  NPO Liquid Status: > 8 hours     Airway   Mallampati: II  TM distance: >3 FB  Neck ROM: full  no difficulty expected  Dental    (+) upper dentures and lower dentures    Pulmonary     breath sounds clear to auscultation  (+) a smoker Current,   Cardiovascular   Exercise tolerance: good (4-7 METS)    ECG reviewed  Rhythm: regular  Rate: normal    (+) hypertension, CAD, PVD, hyperlipidemia    ROS comment: Subclavian steal syndrome    US CAROTID BILATERAL-      IMPRESSION    #1. There is diffuse atherosclerotic involvement of both common carotid  arteries without obstruction.  2. There is fibrotic and mildly calcified plaque in the bulb extending  into the bifurcation and proximal internal carotid on the right. Doppler  as compatible with 50% or lesser stenosis in the bulb and bifurcation,  and 50-75% stenosis in the proximal right internal carotid artery.  3. Diffuse nonobstructive disease is noted in the bifurcation extending  into the internal carotid artery on the left with 16-49% stenosis  throughout.  4. And grade flows demonstrated in the right vertebral artery,  retrograde flows demonstrated on the left.        Neuro/Psych  (+) TIA,    (-) psychiatric history  GI/Hepatic/Renal/Endo    (+)  GERD well controlled, renal disease (Creatinine 1.9),   (-) liver disease, diabetes, hypothyroidism    Musculoskeletal     (+) arthralgias,   Abdominal    Substance History - negative use     OB/GYN          Other   (+) arthritis     (-) history of cancer                                Anesthesia Plan    ASA 3     general   (Labs/studies reviewed  [place])  intravenous induction   Anesthetic plan and risks discussed with patient.  Use of blood products discussed with patient  Consented to blood products.   Plan discussed with CRNA.

## 2017-07-12 ENCOUNTER — APPOINTMENT (OUTPATIENT)
Dept: CARDIOLOGY | Facility: HOSPITAL | Age: 82
End: 2017-07-12
Attending: NEUROLOGICAL SURGERY

## 2017-07-12 LAB
ANION GAP SERPL CALCULATED.3IONS-SCNC: 2 MMOL/L (ref 3–11)
ARTICHOKE IGE QN: 38 MG/DL (ref 0–130)
BASOPHILS # BLD AUTO: 0.01 10*3/MM3 (ref 0–0.2)
BASOPHILS NFR BLD AUTO: 0.1 % (ref 0–1)
BH CV ECHO MEAS - AO ROOT AREA (BSA CORRECTED): 2.2
BH CV ECHO MEAS - AO ROOT AREA: 8.3 CM^2
BH CV ECHO MEAS - AO ROOT DIAM: 3.2 CM
BH CV ECHO MEAS - BSA(HAYCOCK): 1.5 M^2
BH CV ECHO MEAS - BSA: 1.5 M^2
BH CV ECHO MEAS - BZI_BMI: 18.2 KILOGRAMS/M^2
BH CV ECHO MEAS - BZI_METRIC_HEIGHT: 162.6 CM
BH CV ECHO MEAS - BZI_METRIC_WEIGHT: 48.1 KG
BH CV ECHO MEAS - CONTRAST EF (2CH): 48.8 ML/M^2
BH CV ECHO MEAS - CONTRAST EF 4CH: 63.7 ML/M^2
BH CV ECHO MEAS - EDV(CUBED): 48.1 ML
BH CV ECHO MEAS - EDV(MOD-SP2): 41 ML
BH CV ECHO MEAS - EDV(MOD-SP4): 91 ML
BH CV ECHO MEAS - EDV(TEICH): 55.8 ML
BH CV ECHO MEAS - EF(CUBED): 79.9 %
BH CV ECHO MEAS - EF(MOD-SP2): 48.8 %
BH CV ECHO MEAS - EF(MOD-SP4): 63.7 %
BH CV ECHO MEAS - EF(TEICH): 73.2 %
BH CV ECHO MEAS - ESV(CUBED): 9.7 ML
BH CV ECHO MEAS - ESV(MOD-SP2): 21 ML
BH CV ECHO MEAS - ESV(MOD-SP4): 33 ML
BH CV ECHO MEAS - ESV(TEICH): 15 ML
BH CV ECHO MEAS - FS: 41.4 %
BH CV ECHO MEAS - IVS/LVPW: 1.3
BH CV ECHO MEAS - IVSD: 1.8 CM
BH CV ECHO MEAS - LA DIMENSION: 3.6 CM
BH CV ECHO MEAS - LA/AO: 1.1
BH CV ECHO MEAS - LAT PEAK E' VEL: 6.4 CM/SEC
BH CV ECHO MEAS - LV DIASTOLIC VOL/BSA (35-75): 60.9 ML/M^2
BH CV ECHO MEAS - LV MASS(C)D: 230.3 GRAMS
BH CV ECHO MEAS - LV MASS(C)DI: 154.2 GRAMS/M^2
BH CV ECHO MEAS - LV MAX PG: 5.4 MMHG
BH CV ECHO MEAS - LV MEAN PG: 1.8 MMHG
BH CV ECHO MEAS - LV SYSTOLIC VOL/BSA (12-30): 22.1 ML/M^2
BH CV ECHO MEAS - LV V1 MAX: 116.5 CM/SEC
BH CV ECHO MEAS - LV V1 MEAN: 60.2 CM/SEC
BH CV ECHO MEAS - LV V1 VTI: 25.6 CM
BH CV ECHO MEAS - LVIDD: 3.6 CM
BH CV ECHO MEAS - LVIDS: 2.1 CM
BH CV ECHO MEAS - LVLD AP2: 6.4 CM
BH CV ECHO MEAS - LVLD AP4: 7.1 CM
BH CV ECHO MEAS - LVLS AP2: 5.1 CM
BH CV ECHO MEAS - LVLS AP4: 5.8 CM
BH CV ECHO MEAS - LVOT AREA (M): 2.3 CM^2
BH CV ECHO MEAS - LVOT AREA: 2.3 CM^2
BH CV ECHO MEAS - LVOT DIAM: 1.7 CM
BH CV ECHO MEAS - LVPWD: 1.4 CM
BH CV ECHO MEAS - MED PEAK E' VEL: 6.44 CM/SEC
BH CV ECHO MEAS - MV A MAX VEL: 108.6 CM/SEC
BH CV ECHO MEAS - MV E MAX VEL: 90.8 CM/SEC
BH CV ECHO MEAS - MV E/A: 0.84
BH CV ECHO MEAS - PA ACC SLOPE: 578.8 CM/SEC^2
BH CV ECHO MEAS - PA ACC TIME: 0.16 SEC
BH CV ECHO MEAS - PA PR(ACCEL): 5.3 MMHG
BH CV ECHO MEAS - RAP SYSTOLE: 8 MMHG
BH CV ECHO MEAS - RVDD: 2.6 CM
BH CV ECHO MEAS - RVSP: 31.7 MMHG
BH CV ECHO MEAS - SI(CUBED): 25.7 ML/M^2
BH CV ECHO MEAS - SI(LVOT): 39.8 ML/M^2
BH CV ECHO MEAS - SI(MOD-SP2): 13.4 ML/M^2
BH CV ECHO MEAS - SI(MOD-SP4): 38.8 ML/M^2
BH CV ECHO MEAS - SI(TEICH): 27.3 ML/M^2
BH CV ECHO MEAS - SV(CUBED): 38.4 ML
BH CV ECHO MEAS - SV(LVOT): 59.4 ML
BH CV ECHO MEAS - SV(MOD-SP2): 20 ML
BH CV ECHO MEAS - SV(MOD-SP4): 58 ML
BH CV ECHO MEAS - SV(TEICH): 40.8 ML
BH CV ECHO MEAS - TAPSE (>1.6): 2.8 CM2
BH CV ECHO MEAS - TR MAX VEL: 243.4 CM/SEC
BH CV VAS BP RIGHT ARM: NORMAL MMHG
BH CV XLRA - RV BASE: 4.6 CM
BH CV XLRA - RV LENGTH: 6.7 CM
BH CV XLRA - RV MID: 3.3 CM
BH CV XLRA - TDI S': 13.2 CM/SEC
BUN BLD-MCNC: 30 MG/DL (ref 9–23)
BUN/CREAT SERPL: 18.8 (ref 7–25)
CALCIUM SPEC-SCNC: 8.1 MG/DL (ref 8.7–10.4)
CHLORIDE SERPL-SCNC: 107 MMOL/L (ref 99–109)
CHOLEST SERPL-MCNC: 85 MG/DL (ref 0–200)
CO2 SERPL-SCNC: 23 MMOL/L (ref 20–31)
CREAT BLD-MCNC: 1.6 MG/DL (ref 0.6–1.3)
CYTO UR: NORMAL
DEPRECATED RDW RBC AUTO: 52.4 FL (ref 37–54)
E/E' RATIO: 14
EOSINOPHIL # BLD AUTO: 0.02 10*3/MM3 (ref 0–0.3)
EOSINOPHIL NFR BLD AUTO: 0.2 % (ref 0–3)
ERYTHROCYTE [DISTWIDTH] IN BLOOD BY AUTOMATED COUNT: 15.6 % (ref 11.3–14.5)
GFR SERPL CREATININE-BSD FRML MDRD: 31 ML/MIN/1.73
GLUCOSE BLD-MCNC: 114 MG/DL (ref 70–100)
GLUCOSE BLDC GLUCOMTR-MCNC: 122 MG/DL (ref 70–130)
HCT VFR BLD AUTO: 27.1 % (ref 34.5–44)
HDLC SERPL-MCNC: 30 MG/DL (ref 40–60)
HGB BLD-MCNC: 8.9 G/DL (ref 11.5–15.5)
IMM GRANULOCYTES # BLD: 0.04 10*3/MM3 (ref 0–0.03)
IMM GRANULOCYTES NFR BLD: 0.3 % (ref 0–0.6)
LAB AP CASE REPORT: NORMAL
LAB AP CLINICAL INFORMATION: NORMAL
LV EF 2D ECHO EST: 75 %
LYMPHOCYTES # BLD AUTO: 1.08 10*3/MM3 (ref 0.6–4.8)
LYMPHOCYTES NFR BLD AUTO: 8.1 % (ref 24–44)
Lab: NORMAL
MCH RBC QN AUTO: 30.3 PG (ref 27–31)
MCHC RBC AUTO-ENTMCNC: 32.8 G/DL (ref 32–36)
MCV RBC AUTO: 92.2 FL (ref 80–99)
MONOCYTES # BLD AUTO: 1.06 10*3/MM3 (ref 0–1)
MONOCYTES NFR BLD AUTO: 8 % (ref 0–12)
NEUTROPHILS # BLD AUTO: 11.09 10*3/MM3 (ref 1.5–8.3)
NEUTROPHILS NFR BLD AUTO: 83.3 % (ref 41–71)
PATH REPORT.FINAL DX SPEC: NORMAL
PATH REPORT.GROSS SPEC: NORMAL
PLATELET # BLD AUTO: 201 10*3/MM3 (ref 150–450)
PMV BLD AUTO: 9.9 FL (ref 6–12)
POTASSIUM BLD-SCNC: 4.4 MMOL/L (ref 3.5–5.5)
RBC # BLD AUTO: 2.94 10*6/MM3 (ref 3.89–5.14)
SODIUM BLD-SCNC: 132 MMOL/L (ref 132–146)
TRIGL SERPL-MCNC: 91 MG/DL (ref 0–150)
WBC NRBC COR # BLD: 13.3 10*3/MM3 (ref 3.5–10.8)

## 2017-07-12 PROCEDURE — 97165 OT EVAL LOW COMPLEX 30 MIN: CPT

## 2017-07-12 PROCEDURE — 80061 LIPID PANEL: CPT | Performed by: NURSE PRACTITIONER

## 2017-07-12 PROCEDURE — 93306 TTE W/DOPPLER COMPLETE: CPT

## 2017-07-12 PROCEDURE — 25010000002 HALOPERIDOL LACTATE PER 5 MG: Performed by: NURSE PRACTITIONER

## 2017-07-12 PROCEDURE — 82962 GLUCOSE BLOOD TEST: CPT

## 2017-07-12 PROCEDURE — 99233 SBSQ HOSP IP/OBS HIGH 50: CPT | Performed by: INTERNAL MEDICINE

## 2017-07-12 PROCEDURE — 92523 SPEECH SOUND LANG COMPREHEN: CPT

## 2017-07-12 PROCEDURE — 99024 POSTOP FOLLOW-UP VISIT: CPT | Performed by: THORACIC SURGERY (CARDIOTHORACIC VASCULAR SURGERY)

## 2017-07-12 PROCEDURE — 97162 PT EVAL MOD COMPLEX 30 MIN: CPT

## 2017-07-12 PROCEDURE — 94799 UNLISTED PULMONARY SVC/PX: CPT

## 2017-07-12 PROCEDURE — 85025 COMPLETE CBC W/AUTO DIFF WBC: CPT | Performed by: PHYSICIAN ASSISTANT

## 2017-07-12 PROCEDURE — 93306 TTE W/DOPPLER COMPLETE: CPT | Performed by: INTERNAL MEDICINE

## 2017-07-12 PROCEDURE — 99223 1ST HOSP IP/OBS HIGH 75: CPT | Performed by: PSYCHIATRY & NEUROLOGY

## 2017-07-12 PROCEDURE — 25010000002 FUROSEMIDE PER 20 MG: Performed by: PHYSICIAN ASSISTANT

## 2017-07-12 PROCEDURE — 94640 AIRWAY INHALATION TREATMENT: CPT

## 2017-07-12 PROCEDURE — 94760 N-INVAS EAR/PLS OXIMETRY 1: CPT

## 2017-07-12 PROCEDURE — 80048 BASIC METABOLIC PNL TOTAL CA: CPT | Performed by: PHYSICIAN ASSISTANT

## 2017-07-12 RX ORDER — HALOPERIDOL 5 MG/ML
2 INJECTION INTRAMUSCULAR ONCE
Status: COMPLETED | OUTPATIENT
Start: 2017-07-12 | End: 2017-07-12

## 2017-07-12 RX ORDER — AMLODIPINE BESYLATE 5 MG/1
5 TABLET ORAL
Status: DISCONTINUED | OUTPATIENT
Start: 2017-07-12 | End: 2017-07-14

## 2017-07-12 RX ORDER — ACETAMINOPHEN 325 MG/1
650 TABLET ORAL EVERY 6 HOURS PRN
Status: DISCONTINUED | OUTPATIENT
Start: 2017-07-12 | End: 2017-07-15 | Stop reason: HOSPADM

## 2017-07-12 RX ORDER — HALOPERIDOL 5 MG/ML
2 INJECTION INTRAMUSCULAR EVERY 6 HOURS PRN
Status: DISCONTINUED | OUTPATIENT
Start: 2017-07-12 | End: 2017-07-13

## 2017-07-12 RX ORDER — ATORVASTATIN CALCIUM 40 MG/1
40 TABLET, FILM COATED ORAL NIGHTLY
Status: DISCONTINUED | OUTPATIENT
Start: 2017-07-12 | End: 2017-07-15 | Stop reason: HOSPADM

## 2017-07-12 RX ORDER — QUETIAPINE FUMARATE 25 MG/1
25 TABLET, FILM COATED ORAL EVERY 8 HOURS SCHEDULED
Status: DISCONTINUED | OUTPATIENT
Start: 2017-07-12 | End: 2017-07-13

## 2017-07-12 RX ORDER — FUROSEMIDE 10 MG/ML
40 INJECTION INTRAMUSCULAR; INTRAVENOUS ONCE
Status: COMPLETED | OUTPATIENT
Start: 2017-07-12 | End: 2017-07-12

## 2017-07-12 RX ADMIN — DOCUSATE SODIUM 100 MG: 100 CAPSULE, LIQUID FILLED ORAL at 11:55

## 2017-07-12 RX ADMIN — ACETAMINOPHEN 650 MG: 325 TABLET, FILM COATED ORAL at 06:53

## 2017-07-12 RX ADMIN — DOCUSATE SODIUM 100 MG: 100 CAPSULE, LIQUID FILLED ORAL at 17:26

## 2017-07-12 RX ADMIN — ATENOLOL 50 MG: 50 TABLET ORAL at 08:49

## 2017-07-12 RX ADMIN — FOLIC ACID 1 MG: 1 TABLET ORAL at 08:49

## 2017-07-12 RX ADMIN — FAMOTIDINE 20 MG: 20 TABLET ORAL at 08:48

## 2017-07-12 RX ADMIN — HALOPERIDOL LACTATE 2 MG: 5 INJECTION, SOLUTION INTRAMUSCULAR at 21:46

## 2017-07-12 RX ADMIN — NICARDIPINE HYDROCHLORIDE 10 MG/HR: 25 INJECTION INTRAVENOUS at 04:25

## 2017-07-12 RX ADMIN — HALOPERIDOL LACTATE 2 MG: 5 INJECTION, SOLUTION INTRAMUSCULAR at 17:58

## 2017-07-12 RX ADMIN — CLOPIDOGREL BISULFATE 75 MG: 75 TABLET ORAL at 08:48

## 2017-07-12 RX ADMIN — QUETIAPINE FUMARATE 25 MG: 25 TABLET, FILM COATED ORAL at 21:04

## 2017-07-12 RX ADMIN — SODIUM CHLORIDE 30 ML/HR: 4.5 INJECTION, SOLUTION INTRAVENOUS at 09:48

## 2017-07-12 RX ADMIN — TRIAMTERENE AND HYDROCHLOROTHIAZIDE 1 TABLET: 37.5; 25 TABLET ORAL at 11:55

## 2017-07-12 RX ADMIN — NICARDIPINE HYDROCHLORIDE 10 MG/HR: 25 INJECTION INTRAVENOUS at 06:53

## 2017-07-12 RX ADMIN — ALBUTEROL SULFATE 2.5 MG: 2.5 SOLUTION RESPIRATORY (INHALATION) at 02:00

## 2017-07-12 RX ADMIN — AMLODIPINE BESYLATE 5 MG: 5 TABLET ORAL at 17:26

## 2017-07-12 RX ADMIN — LOSARTAN POTASSIUM 50 MG: 50 TABLET, FILM COATED ORAL at 20:11

## 2017-07-12 RX ADMIN — ATORVASTATIN CALCIUM 40 MG: 40 TABLET, FILM COATED ORAL at 20:11

## 2017-07-12 RX ADMIN — ASPIRIN 81 MG: 81 TABLET, COATED ORAL at 08:49

## 2017-07-12 RX ADMIN — NICARDIPINE HYDROCHLORIDE 10 MG/HR: 25 INJECTION INTRAVENOUS at 01:20

## 2017-07-12 RX ADMIN — FUROSEMIDE 40 MG: 10 INJECTION, SOLUTION INTRAMUSCULAR; INTRAVENOUS at 08:49

## 2017-07-12 RX ADMIN — FAMOTIDINE 20 MG: 20 TABLET ORAL at 17:26

## 2017-07-12 RX ADMIN — LOSARTAN POTASSIUM 50 MG: 50 TABLET, FILM COATED ORAL at 08:49

## 2017-07-12 NOTE — PLAN OF CARE
Problem: Patient Care Overview (Adult)  Goal: Plan of Care Review  Outcome: Ongoing (interventions implemented as appropriate)    07/12/17 0911   Coping/Psychosocial Response Interventions   Plan Of Care Reviewed With patient;family   Outcome Evaluation   Outcome Summary/Follow up Plan PT initial eval complete. Pt demonstrates decreased indep and balance re: functional mobility, warranting further skilled PT services to promote PLOF and safe d/c. Noted veering to L during gait today. Recommend IP rehab placement based upon current level of function; will cont to monitor functional progress and social support to determine approp d/c disposition.          Problem: Stroke (Ischemic) (Adult)  Goal: Signs and Symptoms of Listed Potential Problems Will be Absent or Manageable (Stroke)    07/12/17 0911   Stroke (Ischemic)   Problems Assessed (Stroke (Ischemic)/TIA) motor/sensory impairment   Problems Present (Stroke (Ischemic)/TIA) motor/sensory impairment         Problem: Inpatient Physical Therapy  Goal: Bed Mobility Goal LTG- PT  Outcome: Ongoing (interventions implemented as appropriate)    07/12/17 0911   Bed Mobility PT LTG   Bed Mobility PT LTG, Date Established 07/12/17   Bed Mobility PT LTG, Time to Achieve 2 wks   Bed Mobility PT LTG, Activity Type supine to sit/sit to supine   Bed Mobility PT LTG, [place] Level independent       Goal: Transfer Training Goal 1 LTG- PT  Outcome: Ongoing (interventions implemented as appropriate)    07/12/17 0911   Transfer Training PT LTG   Transfer Training PT LTG, Date Established 07/12/17   Transfer Training PT LTG, Time to Achieve 2 wks   Transfer Training PT LTG, Activity Type sit to stand/stand to sit   Transfer Training PT LTG, [place] Level conditional independence   Transfer Training PT LTG, Assist Device walker, rolling       Goal: Gait Training Goal LTG- PT  Outcome: Ongoing (interventions implemented as appropriate)    07/12/17 0911   Gait Training PT LTG    Gait Training Goal PT LTG, Date Established 07/12/17   Gait Training Goal PT LTG, Time to Achieve 2 wks   Gait Training Goal PT LTG, [place] Level supervision required   Gait Training Goal PT LTG, Assist Device walker, rolling   Gait Training Goal PT LTG, Distance to Achieve 300       Goal: Stair Training Goal LTG- PT  Outcome: Ongoing (interventions implemented as appropriate)    07/12/17 0911   Stair Training PT LTG   Stair Training Goal PT LTG, Date Established 07/12/17   Stair Training Goal PT LTG, Time to Achieve 2 wks   Stair Training Goal PT LTG, Number of Steps 1   Stair Training Goal PT LTG, [place] Level contact guard assist

## 2017-07-12 NOTE — CONSULTS
Neurology    Referring provider:   Tyler Carranza MD  [address] [facility]  [address]  [place] [address]    Reason for Consultation: Postoperative transient left-sided weakness    Chief complaint: Left neck pain postop    History of present illness:  This 82-year-old woman is seems request of Dr. Carranza for evaluation of transient episode of left arm weakness last night with slurred speech.  This apparently spontaneously was associated with some mild confusion where she was pulling at tubes.    Blood pressure has been controlled with Cardene although earlier today and it dropped at 93/61.  She did not become symptomatic at that point.    She had a CT angiogram and a CT perfusion scan last night which were normal.    She is known to have a right carotid stenosis of 50%.  Prior to last night it was asymptomatic.    She has cut back on her smoking over the last for 5 days.  She started about a half a pack a day.    She is not on a cholesterol-lowering medication.    Per the operation she was having the symptomatic subclavian steal symptoms with falling.    This was repaired to yesterday.        Review of Systems: She denies history of chest pain headache or speech difficulties or visual changes.  She has no palpitations and has never had a heart attack.    She has no issues with memory or confusion.  She keeps house in the lives independently by herself with occasional visits from her brother.  All other systems are reviewed and are negative.        Home meds:   Prescriptions Prior to Admission   Medication Sig Dispense Refill Last Dose   • acetaminophen (TYLENOL) 500 MG tablet Take 500 mg by mouth Every 6 (Six) Hours As Needed for Mild Pain .   7/10/2017 at 2100   • aspirin 81 MG EC tablet Take 1 tablet by mouth daily. 30 tablet 3 7/10/2017 at Unknown time   • atenolol (TENORMIN) 50 MG tablet Take 1 tablet by mouth Daily. (Patient taking differently: Take 50 mg by mouth Daily. 1/2 tablet daily) 30 tablet 3  7/10/2017 at 2100   • Cholecalciferol (VITAMIN D3) 2000 UNITS tablet Take 1 tablet by mouth daily.   7/10/2017 at Unknown time   • clopidogrel (PLAVIX) 75 MG tablet Take 1 tablet by mouth daily. 30 tablet 3 7/10/2017 at Unknown time   • coenzyme Q10 100 MG capsule Take 100 mg by mouth Daily.   7/10/2017 at 2100   • docusate sodium (COLACE) 100 MG capsule Take 100 mg by mouth 2 (two) times a day.   7/10/2017 at 2100   • folic acid (FOLVITE) 1 MG tablet Take 1 mg by mouth daily.   7/10/2017 at Unknown time   • losartan (COZAAR) 50 MG tablet Take 1 tablet by mouth 2 (Two) Times a Day. (Patient taking differently: Take 50 mg by mouth Daily.) 60 tablet 2 7/10/2017 at Unknown time   • triamterene-hydrochlorothiazide (MAXZIDE-25) 37.5-25 MG per tablet Take 1 tablet by mouth daily. 30 tablet 3 7/10/2017 at Unknown time   • loratadine (CLARITIN) 10 MG tablet Take 10 mg by mouth Daily.   Unknown at Unknown time   • Multiple Vitamins-Minerals (ICAPS) capsule Take 1 capsule by mouth 2 (two) times a day.   Unknown at Unknown time   • raNITIdine (ZANTAC) 150 MG tablet Take 150 mg by mouth 2 (Two) Times a Day.   Unknown at Unknown time       History  Past Medical History:   Diagnosis Date   • Anemia    • Carotid stenosis    • Coronary artery disease    • CVA (cerebral vascular accident)    • GERD (gastroesophageal reflux disease)    • Hyperlipidemia    • Hypertension    • Hypothyroidism    • Macular degeneration    ,   Past Surgical History:   Procedure Laterality Date   • CARDIAC CATHETERIZATION     • COLONOSCOPY      5-10 years ago   • EYE SURGERY      bilateral cataracts removed   • GALLBLADDER SURGERY     • HYSTERECTOMY     • UT VEIN BYPASS GRAFT,CAROTI-SUBCL Left 7/11/2017    Procedure: LEFT CAROTID ENDARTERCTOMY WITH PATCH, LEFT CAROTID SUBCLAVIAN BYPASS;  Surgeon: Tyler Carranza MD;  Location: [facility];  Service: Vascular   • THYROID SURGERY     ,   Family History   Problem Relation Age of Onset   • Stroke Mother    •  "Hypertension Mother    • Hyperlipidemia Mother    • Heart disease Father    • Other Father      AAA   ,   Social History   Substance Use Topics   • Smoking status: Current Every Day Smoker     Packs/day: 0.50     Years: 45.00     Types: Cigarettes   • Smokeless tobacco: Never Used   • Alcohol use No    and Allergies:  Kiwi extract and Penicillins,    Vital Signs   Blood pressure 93/61, pulse 66, temperature 97.8 °F (36.6 °C), temperature source Oral, resp. rate 18, height 64\" (162.6 cm), weight 106 lb (48.1 kg), SpO2 93 %, not currently breastfeeding.  Body mass index is 18.19 kg/(m^2).    Physical Exam:   General: No acute distress              Neck: Right carotid bruit              Resp: Normal breath sounds              Cor: Regular rhythm              Extr: No edema              Skin: Warm and dry               Neuro: Mentally the patient is alert and oriented.  Her memory attention and concentration are normal.  Her fund of knowledge is normal.    Speech is articulate with no word finding problems.    Cranial nerves show normal fundi full eye movements no ptosis pupils are equal.  Facial sensation is normal.  She has a drawing of the left face which Dr. Carranza attributed to the surgical procedure.    Palate elevates normally tongue protrudes normally.    Reflexes are 3+ and equal bilaterally.    Motor shows normal power and tone in all muscle groups.    She has no pronator drift.    Sensory testing is normal.        Results Review: CTA and CT perfusion scan were personally reviewed and are negative for acute stroke.    She has a old left parietal infarct.        LDL cholesterol is 91.        Labs:  Lab Results (last 72 hours)     Procedure Component Value Units Date/Time    Tissue Exam [[id number]] Collected:  07/11/17 0746    Specimen:  Tissue from Carotid Plaque Updated:  07/11/17 0917    TSH [[id number]]  (Normal) Collected:  07/11/17 1240    Specimen:  Blood Updated:  07/11/17 1326     TSH 2.936 mIU/mL     " POC Glucose Fingerstick [[id number]]  (Abnormal) Collected:  07/11/17 1548    Specimen:  Blood Updated:  07/11/17 1559     Glucose 151 (H) mg/dL     Narrative:       Meter: [id number] : [id number] Bora Eugenio MCDERMOTT    POC Glucose Fingerstick [[id number]]  (Abnormal) Collected:  07/11/17 2305    Specimen:  Blood Updated:  07/11/17 2310     Glucose 160 (H) mg/dL     Narrative:       Meter: [id number] : [id number] Jah Flor    POC Glucose Fingerstick [[id number]]  (Normal) Collected:  07/12/17 0531    Specimen:  Blood Updated:  07/12/17 0536     Glucose 122 mg/dL     Narrative:       Meter: [id number] : [id number] Jah Flor    Basic Metabolic Panel [[id number]]  (Abnormal) Collected:  07/12/17 0502    Specimen:  Blood Updated:  07/12/17 0545     Glucose 114 (H) mg/dL      BUN 30 (H) mg/dL      Creatinine 1.60 (H) mg/dL      Sodium 132 mmol/L      Potassium 4.4 mmol/L      Chloride 107 mmol/L      CO2 23.0 mmol/L      Calcium 8.1 (L) mg/dL      eGFR Non African Amer 31 (L) mL/min/1.73      BUN/Creatinine Ratio 18.8     Anion Gap 2.0 (L) mmol/L     Narrative:       [employer] Kid[employer] Guidelines    Stage     Description        GFR  1         Normal or High     90+  2         Mild decrease      60-89  3         Moderate decrease  30-59  4         Severe decrease    15-29  5         Kidney failure     <15    Lipid Panel [[id number]]  (Abnormal) Collected:  07/12/17 0502    Specimen:  Blood Updated:  07/12/17 0545     Total Cholesterol 85 mg/dL      Triglycerides 91 mg/dL      HDL Cholesterol 30 (L) mg/dL      LDL Cholesterol  38 mg/dL     Narrative:       Cholesterol Reference Ranges:   Desirable       < 200 mg/dL   Borderline    200-239 mg/dL   High Risk       > 239 mg/dL    Triglyceride Reference Ranges:   Normal          < 150 mg/dL   Borderline    150-199 mg/dL   High          200-499 mg/dL   Very High       > 499 mg/dL    HDL Reference Ranges:   Low              < 40 mg/dL   High             >  59 mg/dL    LDL Reference Ranges:   Optimal         < 100 mg/dL   Near Optimal  100-129 mg/dL   Borderline    130-159 mg/dL   High          160-189 mg/dL   Very High       > 189 mg/dL    CBC & Differential [[id number]] Collected:  07/12/17 0502    Specimen:  Blood Updated:  07/12/17 0547    Narrative:       The following orders were created for panel order CBC & Differential.  Procedure                               Abnormality         Status                     ---------                               -----------         ------                     CBC Auto Differential[[id number]]        Abnormal            Final result                 Please view results for these tests on the individual orders.    CBC Auto Differential [[id number]]  (Abnormal) Collected:  07/12/17 0502    Specimen:  Blood Updated:  07/12/17 0547     WBC 13.30 (H) 10*3/mm3      RBC 2.94 (L) 10*6/mm3      Hemoglobin 8.9 (L) g/dL      Hematocrit 27.1 (L) %      MCV 92.2 fL      MCH 30.3 pg      MCHC 32.8 g/dL      RDW 15.6 (H) %      RDW-SD 52.4 fl      MPV 9.9 fL      Platelets 201 10*3/mm3      Neutrophil % 83.3 (H) %      Lymphocyte % 8.1 (L) %      Monocyte % 8.0 %      Eosinophil % 0.2 %      Basophil % 0.1 %      Immature Grans % 0.3 %      Neutrophils, Absolute 11.09 (H) 10*3/mm3      Lymphocytes, Absolute 1.08 10*3/mm3      Monocytes, Absolute 1.06 (H) 10*3/mm3      Eosinophils, Absolute 0.02 10*3/mm3      Basophils, Absolute 0.01 10*3/mm3      Immature Grans, Absolute 0.04 (H) 10*3/mm3           Rads:  Imaging Results (last 72 hours)     Procedure Component Value Units Date/Time    CT Angiogram Head With & Without Contrast [[id number]] Collected:  07/11/17 1633     Updated:  07/11/17 1641    Narrative:       EXAMINATION: CT ANGIOGRAM HEAD W WO CONTRAST- 0711/2017     INDICATION: Code 19; G45.8-Other transient cerebral ischemic attacks and  related syndromes; I77.1-Stricture of artery      TECHNIQUE: CT angiogram was performed with images  acquired in the axial  plane and displayed in the axial,  sagittal, and coronal projections  (3-D).     COMPARISON: NONE     FINDINGS: Intracranial CTA demonstrates patent internal carotid  arteries. The anterior cerebral circulation is normal. The anterior  communicating artery is normal. There is no stenosis or occlusion of the  middle cerebral arteries. The basilar artery and posterior fossa  circulation are also normal.       Impression:       Normal intracranial CTA.     D:  07/11/2017  E:  07/11/2017        This report was finalized on 7/11/2017 4:39 PM by Dr. Dani Guzman MD.       CT Cerebral Perfusion With & Without Contrast [[id number]] Collected:  07/11/17 1632     Updated:  07/11/17 1641    Narrative:       EXAMINATION: CT CEREBRAL PERFUSION W WO CONTRAST- 0711/2017     INDICATION: Code 19; G45.8-Other transient cerebral ischemic attacks and  related syndromes; I77.1-Stricture of artery         TECHNIQUE: CT cerebral perfusion imaging was performed with data  acquisition regarding blood volume, blood flow, mean transit time and  time to maximum flow.     COMPARISON: NONE     FINDINGS: There is diminished blood flow, blood volume and increased  mean transit time in the left posterior parietal region. This  corresponds to an old left parietal infarct. Otherwise there is no  significant asymmetry in particular regarding blood volume and blood  flow.       Impression:       Old left posterior parietal infarct. There is no evidence of  acute potential reversible neuro ischemia.     D:  07/11/2017  E:  07/11/2017        This report was finalized on 7/11/2017 4:39 PM by Dr. Dani Guzman MD.       CT Angiogram Neck With & Without Contrast [[id number]] Collected:  07/11/17 1642     Updated:  07/12/17 0928    Narrative:       EXAMINATION: CT ANGIOGRAM NECK W WO CONTRAST- 0711/2017     INDICATION: Code 19; G45.8-Other transient cerebral ischemic attacks and  related syndromes; I77.1-Stricture of artery          TECHNIQUE: CT angiogram of the neck was performed with images acquired  prior to and following intravenous contrast.     The radiation dose reduction device was turned on for each scan per the  ALARA (As Low as Reasonably Achievable) protocol.     COMPARISON: NONE     FINDINGS: There is calcification of the carotid bifurcations rendering  accurate stenosis somewhat difficult. On the right there is calcific  stenosis estimated to measure 50%. The left carotid bifurcation  demonstrates minimal calcification with no stenosis. Furthermore there  is a graft from the left common carotid artery to the left subclavian  artery. This appears patent without stenosis or occlusion. There is air  at the surgical site on the left.  Lastly there is a large right thyroid  nodule.       Impression:       1. Approximately 50% calcific stenosis of the right carotid bifurcation.  2. Lesser, noncritical stenosis of the left carotid bifurcation.  3. 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     D:  07/11/2017  E:  07/11/2017        This report was finalized on 7/12/2017 9:26 AM by Dr. Dani Guzman MD.               Assessment: Probable transient ischemic attack reference to her right carotid artery       Plan:    Best medical therapy would be appropriate given the postoperative nature of the symptoms of the wide fluctuations in her blood pressure overnight.    She is on aspirin and Plavix which is appropriate.    40 mg of Lipitor should be sufficient given her LDL cholesterol 91.        Comment:   This is representative first symptomatic episode with her right carotid artery.    I will would to probably 10 to be conservative as low she has recurrent symptoms.    I discussed the patients findings and my recommendations with patient, family and nursing staff      Manuel Lopez MD  07/12/17  2:07 PM

## 2017-07-12 NOTE — CONSULTS
[facility] Consult Note      Patient Care Team:  HEBERT Montanez as PCP - General (Family Medicine)  LINDA Smith as Physician Assistant (Cardiology)  Tyler Marie MD as Consulting Physician (Cardiology)  HEBERT Montanez Robert O, MD    Subjective     History of Present Illness:  Miss Uriostegui is a pleasant 82-year-old female with hypertension, hypercholesterolemia, previous CVA, carotid artery stenosis, and subclavian steal syndrome.  She was admitted [facility] on 7/11/17 and underwent a left internal carotid artery CEA and left common carotid to left subclavian artery bypass by Dr. Carranza.  We have been asked to see Miss Uriostegui due to hypertension and for medical management.  Currently she is denying any chest pain, shortness of breath, or palpitations.  He apparently had some neuro changes after surgery however these have resolved.  She is currently on IV Cardene.  She does complain of some left lower extremity numbness however she states that this was present prior to her surgery.      Current Facility-Administered Medications:   •  acetaminophen (TYLENOL) tablet 500 mg, 500 mg, Oral, Q6H PRN, LINDA Abad  •  acetaminophen (TYLENOL) tablet 650 mg, 650 mg, Oral, Q6H PRN, Tyler Carranza MD, 650 mg at 07/12/17 0653  •  albuterol (PROVENTIL) nebulizer solution 0.5% 2.5 mg/0.5mL, 2.5 mg, Nebulization, Q4H PRN, HEBERT Hardy, 2.5 mg at 07/12/17 0200  •  aspirin EC tablet 81 mg, 81 mg, Oral, Daily, LINDA Abad, 81 mg at 07/12/17 0849  •  atenolol (TENORMIN) tablet 50 mg, 50 mg, Oral, Daily, Mini Fischer, [facility], 50 mg at 07/12/17 0849  •  atorvastatin (LIPITOR) tablet 80 mg, 80 mg, Oral, Nightly, Moi Fisher MD, 80 mg at 07/11/17 2020  •  Chlorhexidine Gluconate Cloth 2 % pads 1 application, 1 application, Topical, Q12H PRN, Fatimah Marquez PA-C  •  clopidogrel (PLAVIX) tablet 75  mg, 75 mg, Oral, Daily, LINDA Abad, 75 mg at 07/12/17 0848  •  docusate sodium (COLACE) capsule 100 mg, 100 mg, Oral, BID, LINDA Abad, 100 mg at 07/11/17 1725  •  famotidine (PEPCID) tablet 20 mg, 20 mg, Oral, BID, LINDA Abad, 20 mg at 07/12/17 0848  •  folic acid (FOLVITE) tablet 1 mg, 1 mg, Oral, Daily, LINDA Abad, 1 mg at 07/12/17 0849  •  losartan (COZAAR) tablet 50 mg, 50 mg, Oral, BID, LINDA Abad, 50 mg at 07/12/17 0849  •  Morphine sulfate (PF) injection 2 mg, 2 mg, Intravenous, Q4H PRN, Tyler Carranza MD, 2 mg at 07/11/17 2159  •  niCARdipine (CARDENE) 25 mg/250 mL (0.1 mg/mL) 0.9% NS VTB infusion, 5-15 mg/hr, Intravenous, Titrated, LINDA Abad, Last Rate: 100 mL/hr at 07/12/17 0653, 10 mg/hr at 07/12/17 0653  •  phenylephrine (SLIME-SYNEPHRINE) 50 mg in sodium chloride 0.9 % 250 mL (0.2 mg/mL) infusion, 0.5-3 mcg/kg/min, Intravenous, Titrated, LINDA Abad  •  sodium chloride 0.45 % infusion, 30 mL/hr, Intravenous, Continuous, LINDA Abad, Last Rate: 30 mL/hr at 07/12/17 0948, 30 mL/hr at 07/12/17 0948  •  sodium chloride 0.9 % flush 1-10 mL, 1-10 mL, Intravenous, PRN, LINDA Abad  •  sodium chloride 0.9 % flush 1-10 mL, 1-10 mL, Intravenous, PRN, Moi Fisher MD  •  triamterene-hydrochlorothiazide (MAXZIDE-25) 37.5-25 MG per tablet 1 tablet, 1 tablet, Oral, Daily, LINDA Abad, 1 tablet at 07/11/17 1148  •  vancomycin in dextrose 5% 150 mL (VANCOCIN) IVPB 750 mg, 750 mg, Intravenous, Once, Fatimah Marquez PA-C    Social History     Social History   • Marital status:      Spouse name: N/A   • Number of children: 2   • Years of education: N/A     Occupational History   •       Retired     Social History Main Topics   • Smoking status: Current Every Day Smoker     Packs/day: 0.50     Years: 45.00     Types: Cigarettes   • Smokeless tobacco: Never Used   • Alcohol use No   •  Drug use: No   • Sexual activity: Defer     Other Topics Concern   • Not on file     Social History Narrative       Family History   Problem Relation Age of Onset   • Stroke Mother    • Hypertension Mother    • Hyperlipidemia Mother    • Heart disease Father    • Other Father      AAA       Review of Systems   Constitutional: Negative.    HENT: Negative.    Eyes: Negative.    Respiratory: Negative.    Cardiovascular: Negative.    Gastrointestinal: Negative.    Endocrine: Negative.    Genitourinary: Negative.    Musculoskeletal: Positive for arthralgias.   Skin: Negative.    Allergic/Immunologic: Negative.    Neurological: Positive for dizziness.   Hematological: Negative.    Psychiatric/Behavioral: Negative.           Objective     Vital Signs  Temp:  [96.1 °F (35.6 °C)-97.5 °F (36.4 °C)] 97.5 °F (36.4 °C)  Heart Rate:  [54-71] 59  Resp:  [16-18] 16  BP: ()/(32-92) 116/48  Arterial Line BP: ()/(29-64) 118/33      Intake/Output Summary (Last 24 hours) at 07/12/17 1038  Last data filed at 07/12/17 0600   Gross per 24 hour   Intake           2621.4 ml   Output             1510 ml   Net           1111.4 ml        Physical Exam   Constitutional: She is oriented to person, place, and time. She appears well-developed and well-nourished.   HENT:   Head: Normocephalic and atraumatic.   Eyes: Conjunctivae and EOM are normal. Pupils are equal, round, and reactive to light.   Neck: Normal range of motion. Neck supple. No tracheal deviation present.   Cardiovascular: Normal rate and regular rhythm.  Exam reveals no gallop.    Murmur heard.  Pulmonary/Chest: Effort normal and breath sounds normal.   Abdominal: Soft. Bowel sounds are normal. She exhibits no distension. There is no tenderness.   Musculoskeletal: Normal range of motion. She exhibits no edema.   Neurological: She is alert and oriented to person, place, and time.   Skin: Skin is warm and dry.   Psychiatric: She has a normal mood and affect.              Results Review:    I reviewed the patient's new clinical results.    WBC WBC   Date/Time Value Ref Range Status   07/12/2017 0502 13.30 (H) 3.50 - 10.80 10*3/mm3 Final   07/10/2017 1419 7.92 3.50 - 10.80 10*3/mm3 Final      HGB Hemoglobin   Date/Time Value Ref Range Status   07/12/2017 0502 8.9 (L) 11.5 - 15.5 g/dL Final   07/10/2017 1419 11.0 (L) 11.5 - 15.5 g/dL Final      HCT Hematocrit   Date/Time Value Ref Range Status   07/12/2017 0502 27.1 (L) 34.5 - 44.0 % Final   07/10/2017 1419 34.7 34.5 - 44.0 % Final      Platlets No results found for: LABPLAT     PT/INR:      Protime   Date/Time Value Ref Range Status   07/10/2017 1419 10.6 9.6 - 11.5 Seconds Final   /  INR   Date/Time Value Ref Range Status   07/10/2017 1419 0.97  Final       Sodium Sodium   Date/Time Value Ref Range Status   07/12/2017 0502 132 132 - 146 mmol/L Final   07/10/2017 1419 138 132 - 146 mmol/L Final      Potassium Potassium   Date/Time Value Ref Range Status   07/12/2017 0502 4.4 3.5 - 5.5 mmol/L Final   07/10/2017 1419 4.8 3.5 - 5.5 mmol/L Final      Chloride Chloride   Date/Time Value Ref Range Status   07/12/2017 0502 107 99 - 109 mmol/L Final   07/10/2017 1419 102 99 - 109 mmol/L Final      Bicarbonate No results found for: PLASMABICARB   BUN BUN   Date/Time Value Ref Range Status   07/12/2017 0502 30 (H) 9 - 23 mg/dL Final   07/10/2017 1419 35 (H) 9 - 23 mg/dL Final      Creatinine Creatinine   Date/Time Value Ref Range Status   07/12/2017 0502 1.60 (H) 0.60 - 1.30 mg/dL Final   07/10/2017 1419 1.90 (H) 0.60 - 1.30 mg/dL Final      Calcium Calcium   Date/Time Value Ref Range Status   07/12/2017 0502 8.1 (L) 8.7 - 10.4 mg/dL Final   07/10/2017 1419 9.6 8.7 - 10.4 mg/dL Final      Magnesium No results found for: MG   Troponin       No components found for: TROP                                                EKG: sinus bradycardia, RBBB, 1st degree AV block.    Assessment/Plan   Patient Active Problem List   Diagnosis   •  Essential hypertension   • Coronary artery disease involving native coronary artery of native heart without angina pectoris   • Palpitations   • Transient cerebral ischemia   • Carotid artery stenosis   • Carotid bruit   • Subclavian artery stenosis, left   • Subclavian steal syndrome   • Hyperlipidemia   • Hypertension   • Hypothyroidism   • GERD (gastroesophageal reflux disease)   • Tobacco abuse   • COPD (chronic obstructive pulmonary disease)   • H/O CVA (cerebral vascular accident)   • Bilateral Carotid Artery Stenosis     Increase bp meds  Wean cardene to d/c    I discussed the patients findings and my recommendations with patient and family    LINDA Woo  07/12/17  10:38 AM

## 2017-07-12 NOTE — PROGRESS NOTES
Adult Nutrition  Assessment/PES    Patient Name:  Kelly Uriostegui  YOB: 1935  MRN: [id number]  Admit Date:  7/11/2017    Assessment Date:  7/12/2017        Reason for Assessment       07/12/17 1218    Reason for Assessment    Reason For Assessment/Visit multidisciplinary rounds;identified at risk by screening criteria    Identified At Risk By Screening Criteria BMI    Time Spent (min) 30    Diagnosis Diagnosis    Cardiac CHF;HTN;Dyslipidemia   subclavin steal syndrome s/p bypass and L CEA    Pulmonary/Critical Care COPD              Nutrition/Diet History       07/12/17 1220    Nutrition/Diet History    Reported/Observed By Patient    Appetite Good    Other pt reports no recent wt loss, states she has been on an eating binge lately            Anthropometrics       07/12/17 1221    Body Mass Index (BMI)    BMI Grade 17 - 19 low grade I            Labs/Tests/Procedures/Meds       07/12/17 1221    Labs/Tests/Procedures/Meds    Labs/Tests Review Reviewed                Nutrition Prescription Ordered       07/12/17 1221    Nutrition Prescription PO    Current PO Diet Regular    Common Modifiers Cardiac;Consistent Carbohydrate                Problem/Interventions:        Problem 1       07/12/17 1221    Nutrition Diagnoses Problem 1    Problem 1 Underweight    Etiology (related to) Medical Diagnosis    Pulmonary/Critical Care COPD    Signs/Symptoms (evidenced by) BMI    BMI 18 - 18.9                    Intervention Goal       07/12/17 1223    Intervention Goal    General Nutrition support treatment    PO Establish PO;Tolerate PO            Nutrition Intervention       07/12/17 1224    Nutrition Intervention    RD/Tech Action Advise alternate selection;Menu provided;Follow Tx progress;Care plan reviewd;Encourage intake              Education/Evaluation       07/12/17 1224    Monitor/Evaluation    Monitor Per protocol;PO intake;Weight;Pertinent labs        Comments:      Electronically signed by:  Marva STEINER  JOHN Cash  07/12/17 12:24 PM

## 2017-07-12 NOTE — PLAN OF CARE
Problem: Patient Care Overview (Adult)  Goal: Plan of Care Review  Outcome: Ongoing (interventions implemented as appropriate)    07/12/17 0908   Coping/Psychosocial Response Interventions   Plan Of Care Reviewed With patient;family   Outcome Evaluation   Outcome Summary/Follow up Plan OT initial eval completed. Pt presents with decreased independence with ADL's and mobility. Pt would benefit from continued skilled OT services to assist in returning pt to her PLOF. Recommend inpatient rehab at d/c.         Problem: Inpatient Occupational Therapy  Goal: Transfer Training Goal 1 LTG- OT  Outcome: Ongoing (interventions implemented as appropriate)    07/12/17 0908   Transfer Training OT LTG   Transfer Training OT LTG, Date Established 07/12/17   Transfer Training OT LTG, Time to Achieve 1 wk   Transfer Training OT LTG, Activity Type sit to stand/stand to sit   Transfer Training OT LTG, [place] Level supervision required   Transfer Training OT LTG, Assist Device other (see comments)  (with appropriate AD)       Goal: Coordination Goal LTG- OT  Outcome: Ongoing (interventions implemented as appropriate)    07/12/17 0908   Coordination OT LTG   Coordination OT LTG, Date Established 07/12/17   Coordination OT LTG, Time to Achieve 1 wk   Coordination OT LTG, Activity Type FM written ex program   Coordination OT LTG, [place] Level independent       Goal: Functional Mobility Goal LTG- OT  Outcome: Ongoing (interventions implemented as appropriate)    07/12/17 0908   Functional Mobility OT LTG   Functional Mobility Goal OT LTG, Date Established 07/12/17   Functional Mobility Goal OT LTG, Time to Achieve 1 wk   Functional Mobility Goal OT LTG, [place] Level contact guard   Functional Mobility Goal OT LTG, Assist Device appropriate AD;on oxygen

## 2017-07-12 NOTE — THERAPY EVALUATION
Acute Care - Occupational Therapy Initial Evaluation  [facility]     Patient Name: Kelly Uriostegui  : 1935  MRN: [id number]  Today's Date: 2017  Onset of Illness/Injury or Date of Surgery Date: 17  Date of Referral to OT: 17  Referring Physician: MD Phillip    Admit Date: 2017       ICD-10-CM ICD-9-CM   1. Impaired functional mobility, balance, gait, and endurance Z74.09 V49.89   2. Subclavian steal syndrome G45.8 435.2   3. Subclavian artery stenosis, left I77.1 447.1   4. Impaired mobility and ADLs Z74.09 799.89     Patient Active Problem List   Diagnosis   • Essential hypertension   • Coronary artery disease involving native coronary artery of native heart without angina pectoris   • Palpitations   • Transient cerebral ischemia   • Carotid artery stenosis   • Carotid bruit   • Subclavian artery stenosis, left   • Subclavian steal syndrome   • Hyperlipidemia   • Hypertension   • Hypothyroidism   • GERD (gastroesophageal reflux disease)   • Tobacco abuse   • COPD (chronic obstructive pulmonary disease)   • H/O CVA (cerebral vascular accident)   • Bilateral Carotid Artery Stenosis     Past Medical History:   Diagnosis Date   • Anemia    • Carotid stenosis    • Coronary artery disease    • CVA (cerebral vascular accident)    • GERD (gastroesophageal reflux disease)    • Hyperlipidemia    • Hypertension    • Hypothyroidism    • Macular degeneration      Past Surgical History:   Procedure Laterality Date   • CARDIAC CATHETERIZATION     • COLONOSCOPY      5-10 years ago   • EYE SURGERY      bilateral cataracts removed   • GALLBLADDER SURGERY     • HYSTERECTOMY     • THYROID SURGERY            OT ASSESSMENT FLOWSHEET (last 72 hours)      OT Evaluation       17 0808 17 0807 17 1630 17 1030 07/10/17 1515    Rehab Evaluation    Document Type evaluation  -LS evaluation  -JR evaluation  -SM      Subjective Information agree to therapy;no complaints  -LS no  complaints;agree to therapy  -JR no complaints  -SM      Patient Effort, Rehab Treatment good  -LS excellent  -JR       Symptoms Noted During/After Treatment dizziness  -LS dizziness  -JR       Symptoms Noted Comment  Pt c/o dizziness after ambulation, /49  -JR       General Information    Patient Profile Review yes  -LS yes  -JR       Onset of Illness/Injury or Date of Surgery Date 07/11/17  -LS 07/11/17  -JR       Referring Physician MD Phillip  - Dr. Fisher  -       Pertinent History Of Current Problem Recent episodes of dizziness, falls; CT ang demonstrated L ica stenosis and occlusion of L subclavian. S/p L into carotid endarterectomy with L common carotid to L subclavian bypass on 7/11. Demonstrated post-op L facial droop and weakness.  - Pt admitted with episodes of dizziness and falls. CT angiogram demonstrated 60% L internal carotid artery stenosis but occlusion of the L subclavian artery and subclavian steal syndrome. Pt is s/p L CEA, L common carotid to L subclavian artery bypass. Pt evaluated for post-op CVA however CT cerebral perfusion shows old L posterior parietal infarct. There is no evidence of acute potential reversible neuro ischemia.  -JR       Precautions/Limitations fall precautions;oxygen therapy device and L/min  - fall precautions;oxygen therapy device and L/min  -JR       Prior Level of Function independent:;gait;transfer;ADL's;bathing;dressing;home management;dependent:;driving  - independent:;gait;transfer;bed mobility;ADL's   dependent driving  -JR       Equipment Currently Used at Home cane, straight;walker, rolling;shower chair;commode   owns but did not use PTA  - cane, straight;walker, rolling;commode;shower chair   was not using  -JR  none  -HT none  -MATTY    Plans/Goals Discussed With patient and family;agreed upon  -LS patient and family;agreed upon  -JR       Risks Reviewed patient and family:;LOB;dizziness;increased discomfort;change in vital signs  - patient  and family:;increased discomfort  -JR       Benefits Reviewed patient and family:;improve function;increase independence;increase balance;increase strength  -LS patient and family:;improve function;increase independence  -JR       Barriers to Rehab none identified  -LS none identified  -JR       Living Environment    Lives With sibling(s)  -LS sibling(s)  -JR  sibling(s)  -HT sibling(s)  -MATTY    Living Arrangements house  -LS house  -JR  house  -HT house  -MATTY    Home Accessibility stairs to enter home;tub/shower is not walk in;stairs within home  -LS stairs to enter home;stairs within home;tub/shower is not walk in  -JR  stairs to enter home  -HT stairs within home  -MATTY    Number of Stairs to Enter Home 1  -LS 1  -JR  10  -HT     Number of Stairs Within Home 10   to basement; bed/bath on main level  -LS 10  -JR       Stair Railings at Home    inside, present on right side  -HT inside, present on right side  -MATTY    Type of Financial/Environmental Concern    none  -HT     Transportation Available    car  -HT car  -MATTY    Living Environment Comment  Pt reports living with her younger brother. Her younger brother is not there all the time.  -JR       Clinical Impression    Date of Referral to OT  07/11/17  -JR       OT Diagnosis  Decreased independence with ADL's and mobility  -JR       Patient/Family Goals Statement  Return home  -JR       Criteria for Skilled Therapeutic Interventions Met  yes;treatment indicated  -JR       Rehab Potential  good, to achieve stated therapy goals  -JR       Therapy Frequency  daily  -JR       Anticipated Equipment Needs At Discharge  --   tba further  -JR       Anticipated Discharge Disposition  inpatient rehabilitation facility  -JR       Functional Level Prior    Ambulation    0-->independent  -HT 0-->independent  -MATTY    Transferring    0-->independent  -HT 0-->independent  -MATTY    Toileting    0-->independent  -HT 0-->independent  -MATTY    Bathing    0-->independent  -HT 0-->independent   -MATTY    Dressing    0-->independent  -HT 0-->independent  -MATTY    Eating    0-->independent  -HT 0-->independent  -MATTY    Communication    0-->understands/communicates without difficulty  -HT 0-->understands/communicates without difficulty  -MATTY    Swallowing    0-->swallows foods/liquids without difficulty  -HT 0-->swallows foods/liquids without difficulty  -MATTY    Prior Functional Level Comment    --   up ad padmini  -HT     Vital Signs    Pre Systolic BP Rehab 117  -  -JR       Pre Treatment Diastolic BP 55  -LS 55  -JR       Post Systolic BP Rehab 118  -  -JR       Post Treatment Diastolic BP 49  -LS 49  -JR       Pretreatment Heart Rate (beats/min) 63  -LS 63  -JR       Posttreatment Heart Rate (beats/min) 63  -LS 64  -JR       Pre SpO2 (%) 92  -LS 92  -JR       O2 Delivery Pre Treatment supplemental O2  -LS supplemental O2   4L  -JR       Post SpO2 (%) 96  -LS 95  -JR       O2 Delivery Post Treatment supplemental O2  -LS supplemental O2  -JR       Pre Patient Position Supine  -LS Supine  -JR       Intra Patient Position Standing  -LS Standing  -JR       Post Patient Position Sitting  -LS Sitting  -JR       Pain Assessment    Pain Assessment 0-10  -LS 0-10  -JR No/denies pain  -SM      Pain Score 8  -LS 0  -JR       Post Pain Score 8  -LS 8  -JR       Pain Type Surgical pain  -LS        Pain Location Neck  -LS Neck  -JR       Pain Orientation Right  -LS Left  -JR       Pain Intervention(s) Ambulation/increased activity;Repositioned  -LS Ambulation/increased activity  -JR       Response to Interventions tolerated  -LS tolerated  -JR       Vision Assessment/Intervention    Visual Impairment  visual impairment, left   Niece reports macular degeneration of the L eye  -JR       Visual Impairment Comment  Pt wears glasses.  -JR       Cognitive Assessment/Intervention    Current Cognitive/Communication Assessment functional  -LS functional  -JR       Orientation Status oriented x 4  -LS oriented x 4  -JR        Follows Commands/Answers Questions able to follow single-step instructions;100% of the time;needs cueing  -% of the time;able to follow single-step instructions  -JR       Personal Safety mild impairment;decreased awareness, need for safety;decreased insight to deficits;decreased awareness, need for assist  -LS mild impairment;decreased awareness, need for assist;decreased awareness, need for safety;decreased insight to deficits  -JR       Personal Safety Interventions fall prevention program maintained;gait belt;nonskid shoes/slippers when out of bed  -LS        ROM (Range of Motion)    General ROM no range of motion deficits identified   BLEs  -LS upper extremity range of motion deficits identified  -JR       General ROM Detail  Pt c/o soreness with L UE movement, L shoulder FE noted only to 90 degrees, remainder WFL  -JR       MMT (Manual Muscle Testing)    General MMT Assessment lower extremity strength deficits identified  -LS no strength deficits identified  -JR       Bed Mobility, Assessment/Treatment    Bed Mobility, Assistive Device head of bed elevated  -LS head of bed elevated  -JR       Bed Mob, Supine to Sit, [place] contact guard assist;verbal cues required  -LS contact guard assist  -JR       Bed Mobility, Impairments pain;strength decreased  -LS        Transfer Assessment/Treatment    Transfers, Sit-Stand [place] contact guard assist;2 person assist required;verbal cues required  -LS contact guard assist  -JR       Transfers, Stand-Sit [place] contact guard assist;2 person assist required;verbal cues required  -LS contact guard assist  -JR       Transfers, Sit-Stand-Sit, Assist Device rolling walker  -LS rolling walker  -JR       Transfer, Safety Issues  balance decreased during turns;step length decreased  -JR       Transfer, Impairments strength decreased;impaired balance  -LS impaired balance  -JR       Transfer, Comment VC's for hand placement.   -LS Pt required verbal cues  for hand placement with transfers.  -       Functional Mobility    Functional Mobility- Ind. Level  minimum assist (75% patient effort);2 person assist required  -       Functional Mobility- Device  rolling walker  -       Functional Mobility-Distance (Feet)  --   in hallway  -       Functional Mobility- Safety Issues  balance decreased during turns;step length decreased;supplemental O2  -       Functional Mobility- Comment  Pt veers to the L with walker. Required verbal cues for walker safety. Pt c/o dizziness at end of ambulation.  -       Lower Body Dressing Assessment/Training    LB Dressing Assess/Train, Clothing Type  donning:;slipper socks  -       LB Dressing Assess/Train, Position  sitting;edge of bed  -       LB Dressing Assess/Train, [place]  minimum assist (75% patient effort)  -       LB Dressing Assess/Train, Impairments  coordination impaired   vision impaired  -       LB Dressing Assess/Train, Comment  Pt with difficulty finding opening in sock and grasping with fingers. Pt dropping sock 3 separate times this date prior to being able to koby.  -       Motor Skills/Interventions    Additional Documentation Balance Skills Training (Group)  - Balance Skills Training (Group);Fine Motor Coordination Training (Group);Gross Motor Coordination Training (Group)  -       Balance Skills Training    Sitting-Level of Assistance Contact guard  - Contact guard  -       Sitting-Balance Support Feet supported  - Feet supported  -       Standing-Level of Assistance Contact guard  -LS Contact guard  -JR       Static Standing Balance Support assistive device  - assistive device  -       Gait Balance-Level of Assistance Minimum assistance;x2  -LS Minimum assistance  -       Gait Balance Support assistive device  - assistive device  -       Gross Motor Coordination Training    Gross Motor Skill, Impairments Detail  Finger to nose WFL bilaterally  -       Fine Motor  Coordination Training    Opposition  Left:;Right:;intact  -JR       Sensory Assessment/Intervention    Light Touch RLE;LLE   reports occasional tingling at baseline  -LS LUE;RUE  -JR       LUE Light Touch  WNL  -JR       RUE Light Touch  WNL  -JR       LLE Light Touch WNL  -LS        RLE Light Touch WNL  -LS        General Therapy Interventions    Planned Therapy Interventions  ADL retraining;balance training;bed mobility training;motor coordination training;transfer training  -JR       Positioning and Restraints    Pre-Treatment Position in bed  -LS in bed  -JR       Post Treatment Position chair  -LS chair  -JR       In Chair notified nsg;reclined;call light within reach;encouraged to call for assist;exit alarm on;with family/caregiver;legs elevated  -LS notified nsg;reclined;call light within reach;encouraged to call for assist;exit alarm on;with family/caregiver  -JR       Lower Extremity    Lower Ext Manual Muscle Testing Detail BLEs grossly 4-/5  -LS          User Key  (r) = Recorded By, (t) = Taken By, (c) = Cosigned By    Initials Name Effective Dates    SM Irma Nj, MS CCC-SLP 06/22/15 -     JR Luann Forde, OT 06/22/15 -     LS Cecilia Peña, PT 06/19/15 -     MATTY Rodríguez RN 06/16/16 -      Paola Padron, BASSAM 06/10/16 -            Occupational Therapy Education     Title: PT OT SLP Therapies (Active)     Topic: Occupational Therapy (Active)     Point: ADL training (Active)    Description: Instruct learner(s) on proper safety adaptation and remediation techniques during self care or transfers.   Instruct in proper use of assistive devices.    Learning Progress Summary    Learner Readiness Method Response Comment Documented by Status   Patient Acceptance E NR Educated pt on role of therapy and benefits of activity.  07/12/17 0907 Active   Family Acceptance E NR Educated pt on role of therapy and benefits of activity.  07/12/17 0907 Active                      User Key      Initials Effective Dates Name Provider Type Discipline     06/22/15 -  Luann Forde, OT Occupational Therapist OT                  OT Recommendation and Plan  Anticipated Equipment Needs At Discharge:  (tba further)  Anticipated Discharge Disposition: inpatient rehabilitation facility  Planned Therapy Interventions: ADL retraining, balance training, bed mobility training, motor coordination training, transfer training  Therapy Frequency: daily  Plan of Care Review  Plan Of Care Reviewed With: patient, family  Outcome Summary/Follow up Plan: OT initial eval completed. Pt presents with decreased independence with ADL's and mobility. Pt would benefit from continued skilled OT services to assist in returning pt to her PLOF. Recommend inpatient rehab at d/c.          OT Goals       07/12/17 0908          Transfer Training OT LTG    Transfer Training OT LTG, Date Established 07/12/17  -      Transfer Training OT LTG, Time to Achieve 1 wk  -JR      Transfer Training OT LTG, Activity Type sit to stand/stand to sit  -      Transfer Training OT LTG, [place] Level supervision required  -JR      Transfer Training OT LTG, Assist Device other (see comments)   with appropriate AD  -JR      Coordination OT LTG    Coordination OT LTG, Date Established 07/12/17  -      Coordination OT LTG, Time to Achieve 1 wk  -JR      Coordination OT LTG, Activity Type FM written ex program  -JR      Coordination OT LTG, [place] Level independent  -JR      Functional Mobility OT LTG    Functional Mobility Goal OT LTG, Date Established 07/12/17  -      Functional Mobility Goal OT LTG, Time to Achieve 1 wk  -JR      Functional Mobility Goal OT LTG, [place] Level contact guard  -JR      Functional Mobility Goal OT LTG, Assist Device appropriate AD;on oxygen  -JR        User Key  (r) = Recorded By, (t) = Taken By, (c) = Cosigned By    Initials Name Provider Type    JR Luann Forde, OT Occupational Therapist                 Outcome Measures       07/12/17 0808 07/12/17 0807       How much help from another person do you currently need...    Turning from your back to your side while in flat bed without using bedrails? 3  -LS      Moving from lying on back to sitting on the side of a flat bed without bedrails? 3  -LS      Moving to and from a bed to a chair (including a wheelchair)? 3  -LS      Standing up from a chair using your arms (e.g., wheelchair, bedside chair)? 3  -LS      Climbing 3-5 steps with a railing? 2  -LS      To walk in hospital room? 3  -LS      AM-PAC 6 Clicks Score 17  -LS      How much help from another is currently needed...    Putting on and taking off regular lower body clothing?  3  -JR     Bathing (including washing, rinsing, and drying)  3  -JR     Toileting (which includes using toilet bed pan or urinal)  3  -JR     Putting on and taking off regular upper body clothing  3  -JR     Taking care of personal grooming (such as brushing teeth)  3  -JR     Eating meals  3  -JR     Score  18  -JR     Modified [place] Scale    Modified [place] Scale 3 - Moderate disability.  Requiring some help, but able to walk without assistance.  -LS 3 - Moderate disability.  Requiring some help, but able to walk without assistance.  -JR     Functional Assessment    Outcome Measure Options AM-PAC 6 Clicks Basic Mobility (PT);Modified [place]  -LS AM-PAC 6 Clicks Daily Activity (OT);Modified [place]  -JR       User Key  (r) = Recorded By, (t) = Taken By, (c) = Cosigned By    Initials Name Provider Type    JR Luann Forde OT Occupational Therapist    KWESI Peña, PT Physical Therapist          Time Calculation:   OT Start Time: 0807    Therapy Charges for Today     Code Description Service Date Service Provider Modifiers Qty    [id number]  OT EVAL LOW COMPLEXITY 4 7/12/2017 Luann Forde OT GO 1               Luann Forde OT  7/12/2017

## 2017-07-12 NOTE — PROGRESS NOTES
CTS Progress Note       LOS: 1 day   Patient Care Team:  HEBERT Montanez as PCP - General (Family Medicine)  LINDA Smith as Physician Assistant (Cardiology)  Tyler Marie MD as Consulting Physician (Cardiology)        Vital Signs  Temp:  [96.1 °F (35.6 °C)-97.5 °F (36.4 °C)] 97.5 °F (36.4 °C)  Heart Rate:  [54-71] 59  Resp:  [16-18] 16  BP: ()/(32-92) 116/48  Arterial Line BP: ()/(29-64) 118/33    Physical Exam: Patient is alert and conversant.  Her tongue is midline she has good  strength in the left arm   Results     Results from last 7 days  Lab Units 07/12/17  0502   WBC 10*3/mm3 13.30*   HEMOGLOBIN g/dL 8.9*   HEMATOCRIT % 27.1*   PLATELETS 10*3/mm3 201       Results from last 7 days  Lab Units 07/12/17  0502   SODIUM mmol/L 132   POTASSIUM mmol/L 4.4   CHLORIDE mmol/L 107   CO2 mmol/L 23.0   BUN mg/dL 30*   CREATININE mg/dL 1.60*   GLUCOSE mg/dL 114*   CALCIUM mg/dL 8.1*           Imaging Results (last 24 hours)     Procedure Component Value Units Date/Time    CT Angiogram Head With & Without Contrast [[id number]] Collected:  07/11/17 1633     Updated:  07/11/17 1641    Narrative:       EXAMINATION: CT ANGIOGRAM HEAD W WO CONTRAST- 0711/2017     INDICATION: Code 19; G45.8-Other transient cerebral ischemic attacks and  related syndromes; I77.1-Stricture of artery      TECHNIQUE: CT angiogram was performed with images acquired in the axial  plane and displayed in the axial,  sagittal, and coronal projections  (3-D).     COMPARISON: NONE     FINDINGS: Intracranial CTA demonstrates patent internal carotid  arteries. The anterior cerebral circulation is normal. The anterior  communicating artery is normal. There is no stenosis or occlusion of the  middle cerebral arteries. The basilar artery and posterior fossa  circulation are also normal.       Impression:       Normal intracranial CTA.     D:  07/11/2017  E:  07/11/2017        This report was finalized on 7/11/2017 4:39 PM by   Dani Guzman MD.       CT Cerebral Perfusion With & Without Contrast [[id number]] Collected:  07/11/17 1632     Updated:  07/11/17 1641    Narrative:       EXAMINATION: CT CEREBRAL PERFUSION W WO CONTRAST- 0711/2017     INDICATION: Code 19; G45.8-Other transient cerebral ischemic attacks and  related syndromes; I77.1-Stricture of artery         TECHNIQUE: CT cerebral perfusion imaging was performed with data  acquisition regarding blood volume, blood flow, mean transit time and  time to maximum flow.     COMPARISON: NONE     FINDINGS: There is diminished blood flow, blood volume and increased  mean transit time in the left posterior parietal region. This  corresponds to an old left parietal infarct. Otherwise there is no  significant asymmetry in particular regarding blood volume and blood  flow.       Impression:       Old left posterior parietal infarct. There is no evidence of  acute potential reversible neuro ischemia.     D:  07/11/2017  E:  07/11/2017        This report was finalized on 7/11/2017 4:39 PM by Dr. Dani Guzman MD.       CT Angiogram Neck With & Without Contrast [[id number]] Collected:  07/11/17 1642     Updated:  07/11/17 1642    Narrative:       EXAMINATION: CT ANGIOGRAM NECK W WO CONTRAST- 0711/2017     INDICATION: Code 19; G45.8-Other transient cerebral ischemic attacks and  related syndromes; I77.1-Stricture of artery         TECHNIQUE: CT angiogram of the neck was performed with images acquired  prior to and following intravenous contrast.     The radiation dose reduction device was turned on for each scan per the  ALARA (As Low as Reasonably Achievable) protocol.     COMPARISON: NONE     FINDINGS: There is calcification of the carotid bifurcations rendering  accurate stenosis somewhat difficult. On the right there is calcific  stenosis estimated to measure 50%. The left carotid bifurcation  demonstrates minimal calcification with no stenosis. Furthermore there  is a graft from the left common  carotid artery to the left subclavian  artery. This appears patent without stenosis or occlusion. There is air  at the surgical site on the left.  Lastly there is a large right thyroid  nodule.       Impression:       1. Approximately 50% calcific stenosis of the right carotid bifurcation.  2. Lesser, noncritical stenosis of the left carotid bifurcation.  3. 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     D:  07/11/2017  E:  07/11/2017                Assessment    Principal Problem:    Subclavian steal syndrome  Active Problems:    Hyperlipidemia    Hypertension    Hypothyroidism    GERD (gastroesophageal reflux disease)    Tobacco abuse    COPD (chronic obstructive pulmonary disease)    H/O CVA (cerebral vascular accident)    Bilateral Carotid Artery Stenosis  Patient is status post left carotid endarterectomy and carotid to subclavian bypass.  She has no objective evidence indicative of stroke at this time her CT scan of the brain showed no abnormalities in her left sided carotid endarterectomy was satisfactory.  It was noted in a note yesterday that the patient had a facial droop in the recovery room in the . Dillon was notified.  I was not notified.  Furthermore yesterday at code 19 was called on this patient and I was not notified by that until a few hours later when the neurosurgeon inform me that the CAT scan was normal.    At this time we need to minimize her fluid with Cardene transitioned to oral medication.  I will ask cardiology to see to help with blood pressure management  Plan   Lasix 40 mg IV ×1.  Wean and DC Cardene for blood pressure 140 systolic or less.  Discontinue neck dressing and neck drain at 2 PM today.    Please note that portions of this note were completed with a voice recognition program. Efforts were made to edit the dictations, but occasionally words are mistranscribed.    Tyler Carranza MD  07/12/17  7:05 AM

## 2017-07-12 NOTE — PLAN OF CARE
Problem: Patient Care Overview (Adult)  Goal: Plan of Care Review  Outcome: Ongoing (interventions implemented as appropriate)    07/12/17 1150   Coping/Psychosocial Response Interventions   Plan Of Care Reviewed With patient;family   Patient Care Overview   Progress (initial eval)   Outcome Evaluation   Outcome Summary/Follow up Plan Cognitive-communication eval completed per [facility]. 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 Some degree of memory diffiuclty @ baseline, but has worsened since admitted, per pt. Basic receptive language skills WFL. U/a to assess problem-solving, reasoning this date 2' time constraints (pt needed to use restroom). Further assessment needed in dx tx.      Rec: pt would benefit from cog/comm tx.         Problem: Stroke (Ischemic) (Adult)  Goal: Signs and Symptoms of Listed Potential Problems Will be Absent or Manageable (Stroke)  Outcome: Ongoing (interventions implemented as appropriate)    07/12/17 1150   Stroke (Ischemic)   Problems Assessed (Stroke (Ischemic)/TIA) cognitive impairment;communication impairment   Problems Present (Stroke (Ischemic)/TIA) cognitive impairment;communication impairment         Problem: Inpatient SLP  Goal: Expressive-Patient will improve expressive language skills to allow optimal participation in care  Outcome: Ongoing (interventions implemented as appropriate)    07/12/17 1150   Expressive- Optimal Participation in Care   Expressive Optimal Participation in Care- SLP, Date Established 07/12/17   Expressive Optimal Participation in Care- SLP, Time to Achieve by discharge       Goal: Cognitive-linguistic-Patient will improve Cognitive-linguistic skills to allow optimal participation in care  Outcome: Ongoing (interventions implemented as appropriate)    07/12/17 1150   Cognitive Linguistic- Optimal Participation in Care   Cognitive Linguistic Optimal Participation in Care- SLP,  Date Established 07/12/17   Cognitive Linguistic Optimal Participation in Care- SLP, Time to Achieve by discharge

## 2017-07-12 NOTE — THERAPY EVALUATION
Acute Care - Speech Language Pathology Initial Evaluation  [facility]   Cognitive-Communication Evaluation     Patient Name: Kelly Uriostegui  : 1935  MRN: [id number]  Today's Date: 2017  Onset of Illness/Injury or Date of Surgery Date: 17            Admit Date: 2017     Visit Dx:    ICD-10-CM ICD-9-CM   1. Impaired functional mobility, balance, gait, and endurance Z74.09 V49.89   2. Subclavian steal syndrome G45.8 435.2   3. Subclavian artery stenosis, left I77.1 447.1   4. Impaired mobility and ADLs Z74.09 799.89   5. Cognitive communication deficit R41.841 799.52     Patient Active Problem List   Diagnosis   • Essential hypertension   • Coronary artery disease involving native coronary artery of native heart without angina pectoris   • Palpitations   • Transient cerebral ischemia   • Carotid artery stenosis   • Carotid bruit   • Subclavian artery stenosis, left   • Subclavian steal syndrome   • Hyperlipidemia   • Hypertension   • Hypothyroidism   • GERD (gastroesophageal reflux disease)   • Tobacco abuse   • COPD (chronic obstructive pulmonary disease)   • H/O CVA (cerebral vascular accident)   • Bilateral Carotid Artery Stenosis     Past Medical History:   Diagnosis Date   • Anemia    • Carotid stenosis    • Coronary artery disease    • CVA (cerebral vascular accident)    • GERD (gastroesophageal reflux disease)    • Hyperlipidemia    • Hypertension    • Hypothyroidism    • Macular degeneration      Past Surgical History:   Procedure Laterality Date   • CARDIAC CATHETERIZATION     • COLONOSCOPY      5-10 years ago   • EYE SURGERY      bilateral cataracts removed   • GALLBLADDER SURGERY     • HYSTERECTOMY     • [place] VEIN BYPASS GRAFT,CAROTI-SUBCL Left 2017    Procedure: LEFT CAROTID ENDARTERCTOMY WITH PATCH, LEFT CAROTID SUBCLAVIAN BYPASS;  Surgeon: Tyler Carranza MD;  Location: [facility];  Service: Vascular   • THYROID SURGERY            SLP EVALUATION (last 72 hours)      SLP  Evaluation       07/12/17 1050                Rehab Evaluation    Document Type evaluation  -AS    Subjective Information no complaints;agree to therapy  -AS    General Information    Patient Profile Review yes  -AS    Onset of Illness/Injury 07/11/17  -AS    Date of Surgery 07/11/17  -AS    Subjective Patient Observations Pt alert, cooperative. Niece @ bedside.  -AS    Pertinent History Of Current Problem Pt adm for L CEA. Code 19 called post-op. CT Cereb Perf: old L post parietal infarct; no acute ischemia. No CT/MRI posted.   -AS    Current Diet Limitations regular solid;thin liquids  -AS    Precautions/Limitations, Vision vision impairment, bilaterally;other (see comments)   worse in L eye, r/t macular degeneration per pt/family  -AS    Precautions/Limitations, Hearing WFL;other (see comments)   for purposes of eval  -AS    Prior Level of Function- Communication other (comment)   some degree of baseline memory diff per pt  -AS    Prior Level of Function- Swallowing no diet consistency restrictions  -AS    Plans/Goals Discussed With patient and family;agreed upon  -AS    Barriers to Rehab none identified  -AS    Living Environment    Lives With alone;other (see comments)   brother stays w/ her @ times, traveling in [place] now (per pt)  -AS    Clinical Impression    SLP Diagnosis 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 Some degree of memory diffiuclty @ baseline, but has worsened since admitted, per pt. Basic receptive language skills WFL. U/a to assess problem-solving, reasoning this date 2' time constraints (pt needed to use restroom). Further assessment needed in dx tx.  -AS    Functional Level At Time Of Evaluation impaired  -AS    Patient's Goals For Discharge return home  -AS    Family Goals For Discharge patient able to return to all previous activities/roles  -AS    Criteria for Skilled Therapeutic Interventions Met skilled criteria for  cognitive linguistic intervention met;skilled criteria for speech language intervention met  -AS    Rehab Potential good, to achieve stated therapy goals  -AS    Therapy Frequency 5 times/wk  -AS    Anticipated Discharge Disposition other (see comments)   may need cont'd SLP services after d/c  -AS    Pain Assessment    Pain Assessment No/denies pain  -AS    Cognitive Assessment/Intervention    Current Cognitive/Communication Assessment impaired  -AS    Orientation Status oriented x 4  -AS    Follows Commands/Answers Questions able to follow multi-step instructions;100% of the time  -AS    Short/Long Term Memory moderate impairment, short term memory;other (see comments)   imm recall 5 wrds: 2/5 w/o cues  -AS    Additional Documentation Cognitive Assessment Intervention (Group)  -AS    Cognitive Assessment Intervention    Behavior/Mood Observations alert;cooperative  -AS    Attention unable/difficult to assess  -AS    Problem Solving unable/difficult to assess  -AS    Reasoning unable/difficult to assess  -AS    Organization unable/difficult to assess  -AS    Communication Assessment/Intervention    Additional Documentation Auditory Comprehen Assess/Intervention (Group);Reading Assessment/Intervention (Group);Verbal Expression Assess/Intervention (Group);Motor Speech Assessment/Intervention (Group)  -AS    Auditory Comprehen Assess/Intervention    Auditory Comprehension WNL/WFL  -AS    Auditory Comprehen Assess/Intervention    Able to Identify Objects successful, multiple objects  -AS    Answers Yes/No Questions successful, complex questions  -AS    Able to Follow Commands success, 3-step commands  -AS    Verbal Expression Assess/Intervention    Automatic Speech successful, days of the week;successful, spontaneous greeting  -AS    Speech Repetition successful, sentence  -AS    Speech Fluency fluent speech  -AS    Conversational Speech Comment Intermittent difficulty w/ word retrieval in confrontational naming tasks  and in conversational speech. Semantic cues helped improve word retrieval.  -AS    Reading Assessment/Intervention    Reading Skills Comment Significt vision deficits affects reading ability. Pt able to follow large-print one-step command w/o difficulty.  -AS    Motor Speech Assess/Intervention    Motor Speech-Apraxia Observations no concerns  -AS    Motor Speech- Apraxia WNL/WFL  -AS    Motor Speech-Dysarthria Observations no concerns  -AS    Motor Speech- Dysarthria WNL/WFL  -AS    Motor Speech- Dysarthria Comment Pt 100% intelligible to unfamiliar listener. Pt denied acute changes to speech.  -AS    Communication Treatment Objective and Progress    Expressive Treatment Objectives Improve word retrieval skills;Improve connected speech to express thoughts  -AS    Cognitive Linguistic Treatment Objectives Improve memory skills  -AS    Improve word retrieval skills    Improve word retrieval skills by: completing functional word finding tasks;answer WH question with one word;80%;with inconsistent cues  -AS    Status: Improve word retrieval skills New  -AS    Word Retrieval Skills Progress continue to address  -AS    Improve connected speech to express thoughts    Improve connected speech to express thoughts by: describing a picture;giving basic definition of a word;80%;with inconsistent cues  -AS    Status: Improve connected speech to express thoughts New  -AS    Connected Speech Progress continue to address  -AS    Improve memory skills    Improve memory skills through: recalling unrelated word lists immediately;recall details of the day;listen to a paragraph and answer questions;80%;with inconsistent cues  -AS    Status: Improve memory skills New  -AS    Memory Skills Progress continue to address  -AS      User Key  (r) = Recorded By, (t) = Taken By, (c) = Cosigned By    Initials Name Effective Dates    AS Jessica Paredes MS [facility]-SLP 06/22/15 -            EDUCATION  The patient has been educated in the following  areas:   Cognitive Impairment Communication Impairment.    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 Some degree of memory diffiuclty @ baseline, but has worsened since admitted, per pt. Basic receptive language skills WFL. U/a to assess problem-solving, reasoning this date 2' time constraints (pt needed to use restroom). Further assessment needed in dx tx.     Rehab Potential: good, to achieve stated therapy goals  Criteria for Skilled Therapeutic Interventions Met: skilled criteria for cognitive linguistic intervention met, skilled criteria for speech language intervention met  Anticipated Discharge Disposition: other (see comments) (may need cont'd SLP services after d/c)     Therapy Frequency: 5 times/wk          Plan of Care Review  Plan Of Care Reviewed With: patient, family  Progress:  (initial eval)  Outcome Summary/Follow up Plan: Cognitive-communication eval completed per [facility]. 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 Some degree of memory diffiuclty @ baseline, but has worsened since admitted, per pt. Basic receptive language skills WFL. U/a to assess problem-solving, reasoning this date 2' time constraints (pt needed to use restroom). Further assessment needed in dx tx. Rec: pt would benefit from cog/comm tx.          IP SLP Goals       07/12/17 1150          Expressive- Optimal Participation in Care    Expressive Optimal Participation in Care- SLP, Date Established 07/12/17  -AS      Expressive Optimal Participation in Care- SLP, Time to Achieve by discharge  -AS      Cognitive Linguistic- Optimal Participation in Care    Cognitive Linguistic Optimal Participation in Care- SLP, Date Established 07/12/17  -AS      Cognitive Linguistic Optimal Participation in Care- SLP, Time to Achieve by discharge  -AS         User Key  (r) = Recorded By, (t) = Taken By, (c) = Cosigned By    Initials Name Provider Type    AS Jessica Paredes MS CCC-SLP Speech and Language Pathologist              Time Calculation:         Time Calculation- SLP       07/12/17 1153          Time Calculation- SLP    SLP Start Time 1050  -AS      SLP Received On 07/12/17  -AS        User Key  (r) = Recorded By, (t) = Taken By, (c) = Cosigned By    Initials Name Provider Type    AS Jessica Paredes MS CCC-SLP Speech and Language Pathologist          Therapy Charges for Today     Code Description Service Date Service Provider Modifiers Qty    [id number]  ST EVAL SPEECH AND PROD W LANG  4 7/12/2017 Jessica Paredes MS CCC-SLP GN 1                     Jessica Paredes MS CCC-CLAIRE  7/12/2017

## 2017-07-12 NOTE — THERAPY EVALUATION
Acute Care - Physical Therapy Initial Evaluation  [facility]     Patient Name: Kelly Uriostegui  : 1935  MRN: [id number]  Today's Date: 2017   Onset of Illness/Injury or Date of Surgery Date: 17  Date of Referral to PT: 17  Referring Physician: MD Phillip      Admit Date: 2017     Visit Dx:    ICD-10-CM ICD-9-CM   1. Impaired functional mobility, balance, gait, and endurance Z74.09 V49.89   2. Subclavian steal syndrome G45.8 435.2   3. Subclavian artery stenosis, left I77.1 447.1   4. Impaired mobility and ADLs Z74.09 799.89     Patient Active Problem List   Diagnosis   • Essential hypertension   • Coronary artery disease involving native coronary artery of native heart without angina pectoris   • Palpitations   • Transient cerebral ischemia   • Carotid artery stenosis   • Carotid bruit   • Subclavian artery stenosis, left   • Subclavian steal syndrome   • Hyperlipidemia   • Hypertension   • Hypothyroidism   • GERD (gastroesophageal reflux disease)   • Tobacco abuse   • COPD (chronic obstructive pulmonary disease)   • H/O CVA (cerebral vascular accident)   • Bilateral Carotid Artery Stenosis     Past Medical History:   Diagnosis Date   • Anemia    • Carotid stenosis    • Coronary artery disease    • CVA (cerebral vascular accident)    • GERD (gastroesophageal reflux disease)    • Hyperlipidemia    • Hypertension    • Hypothyroidism    • Macular degeneration      Past Surgical History:   Procedure Laterality Date   • CARDIAC CATHETERIZATION     • COLONOSCOPY      5-10 years ago   • EYE SURGERY      bilateral cataracts removed   • GALLBLADDER SURGERY     • HYSTERECTOMY     • THYROID SURGERY            PT ASSESSMENT (last 72 hours)      PT Evaluation       17 0808 17 0807    Rehab Evaluation    Document Type evaluation  -LS evaluation  -JR    Subjective Information agree to therapy;no complaints  -LS no complaints;agree to therapy  -JR    Patient Effort, Rehab Treatment  good  -LS excellent  -JR    Symptoms Noted During/After Treatment dizziness  -LS dizziness  -JR    Symptoms Noted Comment  Pt c/o dizziness after ambulation, /49  -JR    General Information    Patient Profile Review yes  -LS yes  -JR    Onset of Illness/Injury or Date of Surgery Date 07/11/17  -LS 07/11/17  -JR    Referring Physician MD Phillip  - Dr. Fisher  -JR    Pertinent History Of Current Problem Recent episodes of dizziness, falls; CT ang demonstrated L ica stenosis and occlusion of L subclavian. S/p L into carotid endarterectomy with L common carotid to L subclavian bypass on 7/11. Demonstrated post-op L facial droop and weakness.  - Pt admitted with episodes of dizziness and falls. CT angiogram demonstrated 60% L internal carotid artery stenosis but occlusion of the L subclavian artery and subclavian steal syndrome. Pt is s/p L CEA, L common carotid to L subclavian artery bypass. Pt evaluated for post-op CVA however CT cerebral perfusion shows old L posterior parietal infarct. There is no evidence of acute potential reversible neuro ischemia.  -JR    Precautions/Limitations fall precautions;oxygen therapy device and L/min  -LS fall precautions;oxygen therapy device and L/min  -JR    Prior Level of Function independent:;gait;transfer;ADL's;bathing;dressing;home management;dependent:;driving  -LS independent:;gait;transfer;bed mobility;ADL's   dependent driving  -JR    Equipment Currently Used at Home cane, straight;walker, rolling;shower chair;commode   owns but did not use PTA  - cane, straight;walker, rolling;commode;shower chair   was not using  -JR    Plans/Goals Discussed With patient and family;agreed upon  -LS patient and family;agreed upon  -JR    Risks Reviewed patient and family:;LOB;dizziness;increased discomfort;change in vital signs  -LS patient and family:;increased discomfort  -JR    Benefits Reviewed patient and family:;improve function;increase independence;increase  balance;increase strength  -LS patient and family:;improve function;increase independence  -JR    Barriers to Rehab none identified  -LS none identified  -JR    Living Environment    Lives With sibling(s)  -LS sibling(s)  -JR    Living Arrangements house  -LS house  -JR    Home Accessibility stairs to enter home;tub/shower is not walk in;stairs within home  -LS stairs to enter home;stairs within home;tub/shower is not walk in  -JR    Number of Stairs to Enter Home 1  -LS 1  -JR    Number of Stairs Within Home 10   to basement; bed/bath on main level  -LS 10  -JR    Living Environment Comment  Pt reports living with her younger brother. Her younger brother is not there all the time.  -JR    Clinical Impression    Date of Referral to PT 07/11/17  -LS     PT Diagnosis impaired functional mobility, balance, gait  -LS     Patient/Family Goals Statement return to PLOF  -LS     Criteria for Skilled Therapeutic Interventions Met yes;treatment indicated  -LS     Rehab Potential good, to achieve stated therapy goals  -LS     Vital Signs    Pre Systolic BP Rehab 117  -  -JR    Pre Treatment Diastolic BP 55  -LS 55  -JR    Post Systolic BP Rehab 118  -  -JR    Post Treatment Diastolic BP 49  -LS 49  -JR    Pretreatment Heart Rate (beats/min) 63  -LS 63  -JR    Posttreatment Heart Rate (beats/min) 63  -LS 64  -JR    Pre SpO2 (%) 92  -LS 92  -JR    O2 Delivery Pre Treatment supplemental O2  -LS supplemental O2   4L  -JR    Post SpO2 (%) 96  -LS 95  -JR    O2 Delivery Post Treatment supplemental O2  -LS supplemental O2  -JR    Pre Patient Position Supine  -LS Supine  -JR    Intra Patient Position Standing  -LS Standing  -JR    Post Patient Position Sitting  -LS Sitting  -JR    Pain Assessment    Pain Assessment 0-10  -LS 0-10  -JR    Pain Score 8  -LS 0  -JR    Post Pain Score 8  -LS 8  -JR    Pain Type Surgical pain  -LS     Pain Location Neck  -LS Neck  -JR    Pain Orientation Right  -LS Left  -JR    Pain  Intervention(s) Ambulation/increased activity;Repositioned  -LS Ambulation/increased activity  -JR    Response to Interventions tolerated  -LS tolerated  -JR    Vision Assessment/Intervention    Visual Impairment  visual impairment, left   Niece reports macular degeneration of the L eye  -JR    Visual Impairment Comment  Pt wears glasses.  -JR    Cognitive Assessment/Intervention    Current Cognitive/Communication Assessment functional  -LS functional  -JR    Orientation Status oriented x 4  -LS oriented x 4  -JR    Follows Commands/Answers Questions able to follow single-step instructions;100% of the time;needs cueing  -% of the time;able to follow single-step instructions  -JR    Personal Safety mild impairment;decreased awareness, need for safety;decreased insight to deficits;decreased awareness, need for assist  -LS mild impairment;decreased awareness, need for assist;decreased awareness, need for safety;decreased insight to deficits  -JR    Personal Safety Interventions fall prevention program maintained;gait belt;nonskid shoes/slippers when out of bed  -LS     ROM (Range of Motion)    General ROM no range of motion deficits identified   BLEs  -LS upper extremity range of motion deficits identified  -JR    General ROM Detail  Pt c/o soreness with L UE movement, L shoulder FE noted only to 90 degrees, remainder WFL  -JR    MMT (Manual Muscle Testing)    General MMT Assessment lower extremity strength deficits identified  -LS no strength deficits identified  -JR    Lower Extremity    Lower Ext Manual Muscle Testing Detail BLEs grossly 4-/5  -LS     Bed Mobility, Assessment/Treatment    Bed Mobility, Assistive Device head of bed elevated  -LS head of bed elevated  -JR    Bed Mob, Supine to Sit, [place] contact guard assist;verbal cues required  -LS contact guard assist  -JR    Bed Mobility, Impairments pain;strength decreased  -LS     Transfer Assessment/Treatment    Transfers, Sit-Stand [place]  contact guard assist;2 person assist required;verbal cues required  -LS contact guard assist  -JR    Transfers, Stand-Sit [place] contact guard assist;2 person assist required;verbal cues required  -LS contact guard assist  -JR    Transfers, Sit-Stand-Sit, Assist Device rolling walker  -LS rolling walker  -JR    Transfer, Safety Issues  balance decreased during turns;step length decreased  -JR    Transfer, Impairments strength decreased;impaired balance  -LS impaired balance  -JR    Transfer, Comment VC's for hand placement.   -LS Pt required verbal cues for hand placement with transfers.  -JR    Gait Assessment/Treatment    Gait, [place] Level minimum assist (75% patient effort);2 person assist required;verbal cues required  -LS     Gait, Assistive Device rolling walker  -LS     Gait, Distance (Feet) 140  -LS     Gait, Safety Issues supplemental O2  -LS     Gait, Impairments strength decreased;impaired balance  -LS     Gait, Comment Pt tends to veer to L; vc's for posture and maintaining straight pathway.   -LS     Motor Skills/Interventions    Additional Documentation Balance Skills Training (Group)  -LS Balance Skills Training (Group);Fine Motor Coordination Training (Group);Gross Motor Coordination Training (Group)  -JR    Balance Skills Training    Sitting-Level of Assistance Contact guard  -LS Contact guard  -JR    Sitting-Balance Support Feet supported  -LS Feet supported  -JR    Standing-Level of Assistance Contact guard  -LS Contact guard  -JR    Static Standing Balance Support assistive device  -LS assistive device  -JR    Gait Balance-Level of Assistance Minimum assistance;x2  -LS Minimum assistance  -JR    Gait Balance Support assistive device  -LS assistive device  -JR    Fine Motor Coordination Training    Opposition  Left:;Right:;intact  -JR    Gross Motor Coordination Training    Gross Motor Skill, Impairments Detail  Finger to nose WFL bilaterally  -JR    Sensory Assessment/Intervention     Light Touch RLE;LLE   reports occasional tingling at baseline  -LS LUE;RUE  -JR    LUE Light Touch  WNL  -JR    RUE Light Touch  WNL  -JR    LLE Light Touch WNL  -LS     RLE Light Touch WNL  -LS     Positioning and Restraints    Pre-Treatment Position in bed  -LS in bed  -JR    Post Treatment Position chair  -LS chair  -JR    In Chair notified nsg;reclined;call light within reach;encouraged to call for assist;exit alarm on;with family/caregiver;legs elevated  -LS notified nsg;reclined;call light within reach;encouraged to call for assist;exit alarm on;with family/caregiver  -JR      07/11/17 1630 07/11/17 1030    Rehab Evaluation    Document Type evaluation  -SM     Subjective Information no complaints  -SM     General Information    Equipment Currently Used at Home  none  -HT    Living Environment    Lives With  sibling(s)  -HT    Living Arrangements  house  -HT    Home Accessibility  stairs to enter home  -HT    Number of Stairs to Enter Home  10  -HT    Stair Railings at Home  inside, present on right side  -    Type of Financial/Environmental Concern  none  -HT    Transportation Available  car  -HT    Pain Assessment    Pain Assessment No/denies pain  -SM       07/10/17 1515       General Information    Equipment Currently Used at Home none  -MATTY     Living Environment    Lives With sibling(s)  -MATTY     Living Arrangements house  -MATTY     Home Accessibility stairs within home  -MATTY     Stair Railings at Home inside, present on right side  -AMTTY     Transportation Available car  -MATTY       User Key  (r) = Recorded By, (t) = Taken By, (c) = Cosigned By    Initials Name Provider Type     Irma jN MS CCC-SLP Speech and Language Pathologist    JR Luann Forde OT Occupational Therapist    KWESI Peña, PT Physical Therapist    MATTY Aleshia Rodríguez, RN Registered Nurse    HT Paola Padron, BASSAM Registered Nurse          Physical Therapy Education     Title: PT OT SLP Therapies (Active)      Topic: Physical Therapy (Active)     Point: Mobility training (Active)    Learning Progress Summary    Learner Readiness Method Response Comment Documented by Status   Patient Acceptance E,D NR  LS 07/12/17 0910 Active   Family Acceptance E,D NR  LS 07/12/17 0910 Active               Point: Body mechanics (Active)    Learning Progress Summary    Learner Readiness Method Response Comment Documented by Status   Patient Acceptance E,D NR  LS 07/12/17 0910 Active   Family Acceptance E,D NR  LS 07/12/17 0910 Active               Point: Precautions (Active)    Learning Progress Summary    Learner Readiness Method Response Comment Documented by Status   Patient Acceptance E,D NR  LS 07/12/17 0910 Active   Family Acceptance E,D NR  LS 07/12/17 0910 Active                      User Key     Initials Effective Dates Name Provider Type Discipline     06/19/15 -  Cecilia Peña, PT Physical Therapist PT                PT Recommendation and Plan  Anticipated Discharge Disposition: inpatient rehabilitation facility (pending functional progress and social support availability)  PT Frequency: daily  Plan of Care Review  Plan Of Care Reviewed With: patient, family  Outcome Summary/Follow up Plan: PT initial eval complete. Pt demonstrates decreased indep and balance re: functional mobility, warranting further skilled PT services to promote PLOF and safe d/c. Noted veering to L during gait today. Recommend IP rehab placement based upon current level of function; will cont to monitor functional progress and social support to determine approp d/c disposition.           IP PT Goals       07/12/17 0911          Bed Mobility PT LTG    Bed Mobility PT LTG, Date Established 07/12/17  -LS      Bed Mobility PT LTG, Time to Achieve 2 wks  -LS      Bed Mobility PT LTG, Activity Type supine to sit/sit to supine  -LS      Bed Mobility PT LTG, [place] Level independent  -LS      Transfer Training PT LTG    Transfer Training PT LTG, Date  Established 07/12/17  -LS      Transfer Training PT LTG, Time to Achieve 2 wks  -LS      Transfer Training PT LTG, Activity Type sit to stand/stand to sit  -LS      Transfer Training PT LTG, [place] Level conditional independence  -LS      Transfer Training PT LTG, Assist Device walker, rolling  -LS      Gait Training PT LTG    Gait Training Goal PT LTG, Date Established 07/12/17  -LS      Gait Training Goal PT LTG, Time to Achieve 2 wks  -LS      Gait Training Goal PT LTG, [place] Level supervision required  -LS      Gait Training Goal PT LTG, Assist Device walker, rolling  -LS      Gait Training Goal PT LTG, Distance to Achieve 300  -LS      Stair Training PT LTG    Stair Training Goal PT LTG, Date Established 07/12/17  -LS      Stair Training Goal PT LTG, Time to Achieve 2 wks  -LS      Stair Training Goal PT LTG, Number of Steps 1  -LS      Stair Training Goal PT LTG, [place] Level contact guard assist  -LS        User Key  (r) = Recorded By, (t) = Taken By, (c) = Cosigned By    Initials Name Provider Type    LS Cecilia Peña, PT Physical Therapist                Outcome Measures       07/12/17 0808 07/12/17 0807       How much help from another person do you currently need...    Turning from your back to your side while in flat bed without using bedrails? 3  -LS      Moving from lying on back to sitting on the side of a flat bed without bedrails? 3  -LS      Moving to and from a bed to a chair (including a wheelchair)? 3  -LS      Standing up from a chair using your arms (e.g., wheelchair, bedside chair)? 3  -LS      Climbing 3-5 steps with a railing? 2  -LS      To walk in hospital room? 3  -LS      AM-PAC 6 Clicks Score 17  -LS      How much help from another is currently needed...    Putting on and taking off regular lower body clothing?  3  -JR     Bathing (including washing, rinsing, and drying)  3  -JR     Toileting (which includes using toilet bed pan or urinal)  3  -JR     Putting on and  taking off regular upper body clothing  3  -JR     Taking care of personal grooming (such as brushing teeth)  3  -JR     Eating meals  3  -JR     Score  18  -JR     Modified [place] Scale    Modified [place] Scale 3 - Moderate disability.  Requiring some help, but able to walk without assistance.  -LS 3 - Moderate disability.  Requiring some help, but able to walk without assistance.  -JR     Functional Assessment    Outcome Measure Options AM-PAC 6 Clicks Basic Mobility (PT);Modified [place]  -LS AM-PAC 6 Clicks Daily Activity (OT);Modified [place]  -JR       User Key  (r) = Recorded By, (t) = Taken By, (c) = Cosigned By    Initials Name Provider Type    JR Luann Forde, OT Occupational Therapist    KWESI Peña, PT Physical Therapist           Time Calculation:         PT Charges       07/12/17 0915          Time Calculation    Start Time 0808  -      PT Received On 07/12/17  -      PT Goal Re-Cert Due Date 07/22/17  -        User Key  (r) = Recorded By, (t) = Taken By, (c) = Cosigned By    Initials Name Provider Type    KWESI Peña, PT Physical Therapist          Therapy Charges for Today     Code Description Service Date Service Provider Modifiers Qty    47342405609 HC PT EVAL MOD COMPLEXITY 4 7/12/2017 Cecilia Peña, PT GP 1          PT G-Codes  Outcome Measure Options: AM-PAC 6 Clicks Basic Mobility (PT), Marquez Peña, PT  7/12/2017

## 2017-07-12 NOTE — PROGRESS NOTES
INTENSIVIST   PROGRESS NOTE     Hospital:  LOS: 1 day     Ms. Kelly Uriostegui, 82 y.o. female is followed for:      Subclavian steal syndrome    Hypertension    As an Intensivist, we provide an integrated approach to the ICU patient and family, medical management of comorbid conditions, lead interdisciplinary rounds and coordinate the care with all other services, including those from other specialists.     Subjective   S     Mrs. Uriostegui is an 81 y/o WF who was admitted today for an elective left internal carotid endarterectomy and left common carotid to left subclavian artery bypass by Dr. Carranza today.  She is a smoker of 1/2 PPD. She states she is trying to quit smoking.  She is not a diabetic.     Interval History:  POD: 1 Day Post-Op  Doing fair.  She was restless during the night. Pulling out her cords.  Confused during the night.  CODE 19 was called yesterday PM but CT head and Perfusion studies were negative.        ROS:   No fevers.  No chest pain.  No dyspnea.  No nausea, vomiting or diarrhea.    Objective   O     Vitals:    Temp  Min: 96.5 °F (35.8 °C)  Max: 97.8 °F (36.6 °C)  BP  Min: 86/53  Max: 141/56  Pulse  Min: 54  Max: 71  Resp  Min: 16  Max: 18  SpO2  Min: 89 %  Max: 98 % Flow (L/min)  Min: 2  Max: 3    Medications (drips):    niCARdipine Last Rate: Stopped (07/12/17 1109)   phenylephrine (SLIME-SYNEPHRINE) 50 mg/250 (0.2 mg/mL) infusion    sodium chloride Last Rate: 30 mL/hr (07/12/17 0948)     Physical Examination  Telemetry:  Sinus Rhythm: normal sinus rhythm   Constitutional:  No acute distress.   HEENT: Normocephalic and atraumatic.    Left neck dressing.   Cardiovascular: Normal rate, regular and rhythm. Normal heart sounds.   Respiratory: No respiratory distress. Normal respiratory effort.  Normal BS.   Abdominal:  Soft. No masses. Non-tender. No distension. No HSM.   Extremities: No digital cyanosis. No clubbing.   Neurological:   Alert and Oriented to person, place, and time.  Best  Eye Response: 4-->(E4) spontaneous  Best Motor Response: 6-->(M6) obeys commands  Best Verbal Response: 4-->(V4) confused  [place] Coma Scale Score: 14   Left sided lip still weak.   Psychiatric:  Normal affect. Normal behavior.   Lines/Drains/Airways: PIV x2, left neck penrose drain       Results from last 7 days  Lab Units 07/12/17  0502 07/10/17  1419   WBC 10*3/mm3 13.30* 7.92   HEMOGLOBIN g/dL 8.9* 11.0*   MCV fL 92.2 93.3   PLATELETS 10*3/mm3 201 255       Results from last 7 days  Lab Units 07/12/17  0502 07/10/17  1419   SODIUM mmol/L 132 138   POTASSIUM mmol/L 4.4 4.8   CO2 mmol/L 23.0 24.0   CREATININE mg/dL 1.60* 1.90*     Estimated Creatinine Clearance: 20.6 mL/min (by C-G formula based on Cr of 1.6).         CXR 7/10/17 Emphysema, no infiltrates    Results: Reviewed.  I reviewed the patient's new laboratory and imaging results.  I independently reviewed the patient's new images.    Medications: Reviewed.    Assessment/Plan   A / P     82 y.o.female, admitted on 7/11/2017 with Subclavian artery stenosis, left [I77.1]  Subclavian steal syndrome [G45.8]:     1. Left sided carotid artery stenois and left Subclavian Steal Syndrome (symptomatic: falling)  Procedure(s) (LRB):  LEFT CAROTID ENDARTERCTOMY WITH PATCH, LEFT CAROTID SUBCLAVIAN BYPASS (Left)   Dr. Tyler Carranza (Cardiothoracic Surgery)  7/11/2017  2. Bilateral Carotid Artery Stenosis  1. CTA Neck 3/2017 revealed 60% stenosis Rt ICA, 50% stenosis Lt ICA  3. H/O CVA  1. Parietal (unknown side) per Cardiology record  2. On Plavix  4. Hyperlipidemia  1. Not on a statin  5. HTN  1. Atenolol, Cozaar, Maxzide at home. Now also on Norvasc.  6. COPD  1. Tobacco Abuse - Last cigarette Sat 7/9/2017  7. GERD  8. Hypothyroidism  1. Not on replacement  9. No history of DM    Lab Value Date/Time   HGBA1C 5.60 07/10/2017 1419      Nutrition:   Diet Regular; Thin; Cardiac, Consistent Carbohydrate  Advance Directives: Full Code    Assessment / Plan:    1. ICU  medical management post surgery.  2. Creatinine better. Down to 1.6  3. Weaning Cardene to off.  4. Continue: HCTZ, triamterene, Norvasc, Atenolol and Losartan.  5. Cardiology to see as per Dr. Carranza.  6. Diuretics per CT surgery  7. Keep in ICU today.    Yonatan Malave MD, FACP, FCCP, CNSC    Intensive Care Medicine and Pulmonary Medicine

## 2017-07-13 LAB
ANION GAP SERPL CALCULATED.3IONS-SCNC: 6 MMOL/L (ref 3–11)
BASOPHILS # BLD AUTO: 0.02 10*3/MM3 (ref 0–0.2)
BASOPHILS NFR BLD AUTO: 0.2 % (ref 0–1)
BUN BLD-MCNC: 26 MG/DL (ref 9–23)
BUN/CREAT SERPL: 17.3 (ref 7–25)
CALCIUM SPEC-SCNC: 9 MG/DL (ref 8.7–10.4)
CHLORIDE SERPL-SCNC: 107 MMOL/L (ref 99–109)
CO2 SERPL-SCNC: 22 MMOL/L (ref 20–31)
CREAT BLD-MCNC: 1.5 MG/DL (ref 0.6–1.3)
DEPRECATED RDW RBC AUTO: 49.5 FL (ref 37–54)
EOSINOPHIL # BLD AUTO: 0 10*3/MM3 (ref 0–0.3)
EOSINOPHIL NFR BLD AUTO: 0 % (ref 0–3)
ERYTHROCYTE [DISTWIDTH] IN BLOOD BY AUTOMATED COUNT: 15.5 % (ref 11.3–14.5)
GFR SERPL CREATININE-BSD FRML MDRD: 33 ML/MIN/1.73
GLUCOSE BLD-MCNC: 99 MG/DL (ref 70–100)
HCT VFR BLD AUTO: 26.7 % (ref 34.5–44)
HGB BLD-MCNC: 8.9 G/DL (ref 11.5–15.5)
IMM GRANULOCYTES # BLD: 0.03 10*3/MM3 (ref 0–0.03)
IMM GRANULOCYTES NFR BLD: 0.3 % (ref 0–0.6)
LYMPHOCYTES # BLD AUTO: 0.7 10*3/MM3 (ref 0.6–4.8)
LYMPHOCYTES NFR BLD AUTO: 7.1 % (ref 24–44)
MCH RBC QN AUTO: 29.3 PG (ref 27–31)
MCHC RBC AUTO-ENTMCNC: 33.3 G/DL (ref 32–36)
MCV RBC AUTO: 87.8 FL (ref 80–99)
MONOCYTES # BLD AUTO: 1.04 10*3/MM3 (ref 0–1)
MONOCYTES NFR BLD AUTO: 10.5 % (ref 0–12)
NEUTROPHILS # BLD AUTO: 8.1 10*3/MM3 (ref 1.5–8.3)
NEUTROPHILS NFR BLD AUTO: 81.9 % (ref 41–71)
PLATELET # BLD AUTO: 204 10*3/MM3 (ref 150–450)
PMV BLD AUTO: 11.2 FL (ref 6–12)
POTASSIUM BLD-SCNC: 3.8 MMOL/L (ref 3.5–5.5)
RBC # BLD AUTO: 3.04 10*6/MM3 (ref 3.89–5.14)
SODIUM BLD-SCNC: 135 MMOL/L (ref 132–146)
WBC NRBC COR # BLD: 9.89 10*3/MM3 (ref 3.5–10.8)

## 2017-07-13 PROCEDURE — 93005 ELECTROCARDIOGRAM TRACING: CPT | Performed by: PHYSICIAN ASSISTANT

## 2017-07-13 PROCEDURE — 97116 GAIT TRAINING THERAPY: CPT

## 2017-07-13 PROCEDURE — 85025 COMPLETE CBC W/AUTO DIFF WBC: CPT | Performed by: INTERNAL MEDICINE

## 2017-07-13 PROCEDURE — 92612 ENDOSCOPY SWALLOW (FEES) VID: CPT

## 2017-07-13 PROCEDURE — 25010000002 AMIODARONE IN DEXTROSE 5% 360-4.14 MG/200ML-% SOLUTION: Performed by: THORACIC SURGERY (CARDIOTHORACIC VASCULAR SURGERY)

## 2017-07-13 PROCEDURE — 99233 SBSQ HOSP IP/OBS HIGH 50: CPT | Performed by: INTERNAL MEDICINE

## 2017-07-13 PROCEDURE — 97530 THERAPEUTIC ACTIVITIES: CPT

## 2017-07-13 PROCEDURE — 80048 BASIC METABOLIC PNL TOTAL CA: CPT | Performed by: INTERNAL MEDICINE

## 2017-07-13 PROCEDURE — 25010000002 AMIODARONE IN DEXTROSE 5% 150-4.21 MG/100ML-% SOLUTION: Performed by: THORACIC SURGERY (CARDIOTHORACIC VASCULAR SURGERY)

## 2017-07-13 PROCEDURE — 25010000002 FUROSEMIDE PER 20 MG: Performed by: PHYSICIAN ASSISTANT

## 2017-07-13 PROCEDURE — 25010000002 HALOPERIDOL LACTATE PER 5 MG: Performed by: NURSE PRACTITIONER

## 2017-07-13 PROCEDURE — 92610 EVALUATE SWALLOWING FUNCTION: CPT

## 2017-07-13 PROCEDURE — 99024 POSTOP FOLLOW-UP VISIT: CPT | Performed by: THORACIC SURGERY (CARDIOTHORACIC VASCULAR SURGERY)

## 2017-07-13 RX ORDER — FUROSEMIDE 10 MG/ML
20 INJECTION INTRAMUSCULAR; INTRAVENOUS ONCE
Status: COMPLETED | OUTPATIENT
Start: 2017-07-13 | End: 2017-07-13

## 2017-07-13 RX ORDER — QUETIAPINE FUMARATE 25 MG/1
50 TABLET, FILM COATED ORAL NIGHTLY
Status: DISCONTINUED | OUTPATIENT
Start: 2017-07-13 | End: 2017-07-15 | Stop reason: HOSPADM

## 2017-07-13 RX ORDER — METOPROLOL TARTRATE 5 MG/5ML
INJECTION INTRAVENOUS
Status: DISPENSED
Start: 2017-07-13 | End: 2017-07-13

## 2017-07-13 RX ORDER — ACETAMINOPHEN 325 MG/1
325 TABLET ORAL EVERY 6 HOURS PRN
Status: DISCONTINUED | OUTPATIENT
Start: 2017-07-13 | End: 2017-07-15 | Stop reason: HOSPADM

## 2017-07-13 RX ORDER — LORAZEPAM 2 MG/ML
1 INJECTION INTRAMUSCULAR EVERY 4 HOURS PRN
Status: DISCONTINUED | OUTPATIENT
Start: 2017-07-13 | End: 2017-07-15 | Stop reason: HOSPADM

## 2017-07-13 RX ORDER — METOPROLOL TARTRATE 5 MG/5ML
INJECTION INTRAVENOUS
Status: COMPLETED
Start: 2017-07-13 | End: 2017-07-13

## 2017-07-13 RX ORDER — DILTIAZEM HCL IN NACL,ISO-OSM 125 MG/125
5-15 PLASTIC BAG, INJECTION (ML) INTRAVENOUS
Status: DISCONTINUED | OUTPATIENT
Start: 2017-07-13 | End: 2017-07-13 | Stop reason: ALTCHOICE

## 2017-07-13 RX ORDER — HALOPERIDOL 5 MG/ML
1 INJECTION INTRAMUSCULAR EVERY 6 HOURS PRN
Status: DISCONTINUED | OUTPATIENT
Start: 2017-07-13 | End: 2017-07-13

## 2017-07-13 RX ORDER — HALOPERIDOL 5 MG/ML
5 INJECTION INTRAMUSCULAR EVERY 6 HOURS PRN
Status: DISCONTINUED | OUTPATIENT
Start: 2017-07-13 | End: 2017-07-13

## 2017-07-13 RX ADMIN — ATORVASTATIN CALCIUM 40 MG: 40 TABLET, FILM COATED ORAL at 21:04

## 2017-07-13 RX ADMIN — AMIODARONE HYDROCHLORIDE 1 MG/MIN: 1.8 INJECTION, SOLUTION INTRAVENOUS at 08:43

## 2017-07-13 RX ADMIN — FAMOTIDINE 20 MG: 20 TABLET ORAL at 17:59

## 2017-07-13 RX ADMIN — AMLODIPINE BESYLATE 5 MG: 5 TABLET ORAL at 15:21

## 2017-07-13 RX ADMIN — Medication 5 MG/HR: at 01:30

## 2017-07-13 RX ADMIN — METOPROLOL TARTRATE 5 MG: 5 INJECTION, SOLUTION INTRAVENOUS at 00:49

## 2017-07-13 RX ADMIN — HALOPERIDOL LACTATE 2 MG: 5 INJECTION, SOLUTION INTRAMUSCULAR at 04:26

## 2017-07-13 RX ADMIN — ACETAMINOPHEN 650 MG: 325 TABLET, FILM COATED ORAL at 17:59

## 2017-07-13 RX ADMIN — METOPROLOL TARTRATE 5 MG: 5 INJECTION, SOLUTION INTRAVENOUS at 01:02

## 2017-07-13 RX ADMIN — AMIODARONE HYDROCHLORIDE 0.5 MG/MIN: 1.8 INJECTION, SOLUTION INTRAVENOUS at 14:03

## 2017-07-13 RX ADMIN — METOPROLOL TARTRATE 5 MG: 5 INJECTION INTRAVENOUS at 14:12

## 2017-07-13 RX ADMIN — FUROSEMIDE 20 MG: 10 INJECTION, SOLUTION INTRAMUSCULAR; INTRAVENOUS at 15:22

## 2017-07-13 RX ADMIN — DOCUSATE SODIUM 100 MG: 100 CAPSULE, LIQUID FILLED ORAL at 17:59

## 2017-07-13 RX ADMIN — ASPIRIN 81 MG: 81 TABLET, COATED ORAL at 15:21

## 2017-07-13 RX ADMIN — METOPROLOL TARTRATE 5 MG: 5 INJECTION, SOLUTION INTRAVENOUS at 00:54

## 2017-07-13 RX ADMIN — AMIODARONE HYDROCHLORIDE 150 MG: 1.5 INJECTION, SOLUTION INTRAVENOUS at 08:43

## 2017-07-13 RX ADMIN — CLOPIDOGREL BISULFATE 75 MG: 75 TABLET ORAL at 15:21

## 2017-07-13 RX ADMIN — ATENOLOL 50 MG: 50 TABLET ORAL at 08:44

## 2017-07-13 RX ADMIN — LOSARTAN POTASSIUM 50 MG: 50 TABLET, FILM COATED ORAL at 21:04

## 2017-07-13 NOTE — PLAN OF CARE
Problem: Patient Care Overview (Adult)  Goal: Plan of Care Review  Outcome: Ongoing (interventions implemented as appropriate)    07/13/17 1531   Coping/Psychosocial Response Interventions   Plan Of Care Reviewed With patient   Patient Care Overview   Progress progress toward functional goals as expected   Outcome Evaluation   Outcome Summary/Follow up Plan Pt limited by increased lethargy today. Pt required cues to stay awake. Pt amb 180ft with RW with Pawan x2.         Problem: Inpatient Physical Therapy  Goal: Bed Mobility Goal LTG- PT  Outcome: Ongoing (interventions implemented as appropriate)    07/12/17 0911 07/13/17 1531   Bed Mobility PT LTG   Bed Mobility PT LTG, Date Established 07/12/17 --    Bed Mobility PT LTG, Time to Achieve 2 wks --    Bed Mobility PT LTG, Activity Type supine to sit/sit to supine --    Bed Mobility PT LTG, [place] Level independent --    Bed Mobility PT LTG, Outcome --  goal ongoing       Goal: Transfer Training Goal 1 LTG- PT  Outcome: Ongoing (interventions implemented as appropriate)    07/12/17 0911 07/13/17 1531   Transfer Training PT LTG   Transfer Training PT LTG, Date Established 07/12/17 --    Transfer Training PT LTG, Time to Achieve 2 wks --    Transfer Training PT LTG, Activity Type sit to stand/stand to sit --    Transfer Training PT LTG, [place] Level conditional independence --    Transfer Training PT LTG, Assist Device walker, rolling --    Transfer Training PT LTG, Outcome --  goal ongoing       Goal: Gait Training Goal LTG- PT  Outcome: Ongoing (interventions implemented as appropriate)    07/12/17 0911 07/13/17 1531   Gait Training PT LTG   Gait Training Goal PT LTG, Date Established 07/12/17 --    Gait Training Goal PT LTG, Time to Achieve 2 wks --    Gait Training Goal PT LTG, [place] Level supervision required --    Gait Training Goal PT LTG, Assist Device walker, rolling --    Gait Training Goal PT LTG, Distance to Achieve 300 --    Gait  Training Goal PT LTG, Outcome --  goal ongoing       Goal: Stair Training Goal LTG- PT  Outcome: Ongoing (interventions implemented as appropriate)    07/12/17 0911 07/13/17 1531   Stair Training PT LTG   Stair Training Goal PT LTG, Date Established 07/12/17 --    Stair Training Goal PT LTG, Time to Achieve 2 wks --    Stair Training Goal PT LTG, Number of Steps 1 --    Stair Training Goal PT LTG, [place] Level contact guard assist --    Stair Training Goal PT LTG, Outcome --  goal ongoing

## 2017-07-13 NOTE — THERAPY TREATMENT NOTE
Acute Care - Occupational Therapy Treatment Note  [facility]     Patient Name: Kelly Uriostegui  : 1935  MRN: [id number]  Today's Date: 2017  Onset of Illness/Injury or Date of Surgery Date: 17  Date of Referral to OT: 17  Referring Physician: MD Phillip      Admit Date: 2017    Visit Dx:     ICD-10-CM ICD-9-CM   1. Impaired functional mobility, balance, gait, and endurance Z74.09 V49.89   2. Subclavian steal syndrome G45.8 435.2   3. Subclavian artery stenosis, left I77.1 447.1   4. Impaired mobility and ADLs Z74.09 799.89   5. Cognitive communication deficit R41.841 799.52     Patient Active Problem List   Diagnosis   • Essential hypertension   • Coronary artery disease involving native coronary artery of native heart without angina pectoris   • Palpitations   • Transient cerebral ischemia   • Carotid artery stenosis   • Carotid bruit   • Subclavian artery stenosis, left   • Subclavian steal syndrome   • Hyperlipidemia   • Hypertension   • Hypothyroidism   • GERD (gastroesophageal reflux disease)   • Tobacco abuse   • COPD (chronic obstructive pulmonary disease)   • H/O CVA (cerebral vascular accident)   • Bilateral Carotid Artery Stenosis             Adult Rehabilitation Note       17 1439          Rehab Assessment/Intervention    Discipline occupational therapist  -JR      Document Type therapy note (daily note)  -JR      Subjective Information agree to therapy;complains of;fatigue  -JR      Symptoms Noted During/After Treatment none  -JR      Precautions/Limitations fall precautions  -JR      Precautions/Limitations, Vision vision impairment, bilaterally   worse in L eye per family  -JR      Recorded by [JR] Luann Forde OT      Vital Signs    Pre Systolic BP Rehab 181  -JR      Pre Treatment Diastolic BP 78  -JR      Post Systolic BP Rehab 186  -JR      Post Treatment Diastolic BP 78  -JR      Pretreatment Heart Rate (beats/min) 62  -JR      Posttreatment Heart Rate  (beats/min) 63  -JR      Pre SpO2 (%) 99  -JR      O2 Delivery Pre Treatment room air  -JR      Post SpO2 (%) 100  -JR      O2 Delivery Post Treatment room air  -JR      Pre Patient Position Supine  -JR      Intra Patient Position Standing  -JR      Post Patient Position Supine  -JR      Recorded by [JR] Luann Forde OT      Pain Assessment    Pain Assessment 0-10  -JR      Pain Score 0  -JR      Post Pain Score 0  -JR      Recorded by [JR] Luann Forde OT      Cognitive Assessment/Intervention    Current Cognitive/Communication Assessment functional   Pt very lethargic this date  -      Orientation Status oriented x 4  -JR      Follows Commands/Answers Questions 75% of the time;able to follow single-step instructions;needs cueing;needs increased time;needs repetition  -JR      Personal Safety mild impairment;decreased awareness, need for assist;decreased awareness, need for safety;decreased insight to deficits  -JR      Recorded by [JR] Luann Forde OT      Bed Mobility, Assessment/Treatment    Bed Mobility, Assistive Device head of bed elevated  -JR      Bed Mob, Supine to Sit, [place] moderate assist (50% patient effort);2 person assist required  -JR      Bed Mob, Sit to Supine, [place] moderate assist (50% patient effort);2 person assist required  -JR      Bed Mobility, Safety Issues decreased use of arms for pushing/pulling;decreased use of legs for bridging/pushing  -JR      Bed Mobility, Impairments strength decreased;impaired balance;postural control impaired  -JR      Recorded by [JR] Luann Forde OT      Transfer Assessment/Treatment    Transfers, Sit-Stand [place] minimum assist (75% patient effort);2 person assist required  -JR      Transfers, Stand-Sit [place] minimum assist (75% patient effort);2 person assist required  -JR      Transfers, Sit-Stand-Sit, Assist Device rolling walker  -JR      Transfer, Safety Issues balance decreased during turns;step length  decreased  -JR      Transfer, Impairments strength decreased;impaired balance  -JR      Transfer, Comment Verbal cues for hand placement and safe transfer techniques  -JR      Recorded by [JR] Luann Forde OT      Functional Mobility    Functional Mobility- Ind. Level minimum assist (75% patient effort);2 person assist required  -JR      Functional Mobility- Device rolling walker  -JR      Functional Mobility-Distance (Feet) --   in hallway  -JR      Functional Mobility- Safety Issues balance decreased during turns;step length decreased  -JR      Functional Mobility- Comment Pt required assist to guide walker. Pt frequently letting go of walker, required verbal cues to keep hands on walker.  -JR      Recorded by [JR] Luann Forde OT      Balance Skills Training    Sitting-Level of Assistance Minimum assistance  -JR      Sitting-Balance Support Feet unsupported  -JR      Standing-Level of Assistance Minimum assistance  -JR      Static Standing Balance Support assistive device  -JR      Gait Balance-Level of Assistance Minimum assistance;x2  -JR      Gait Balance Support assistive device  -JR      Recorded by [JR] Luann Forde OT      Positioning and Restraints    Pre-Treatment Position in bed  -JR      Post Treatment Position bed  -JR      In Bed notified nsg;supine;call light within reach;encouraged to call for assist;exit alarm on;with family/caregiver  -JR      Recorded by [JR] Luann Forde OT        User Key  (r) = Recorded By, (t) = Taken By, (c) = Cosigned By    Initials Name Effective Dates    JR Luann Forde OT 06/22/15 -                 OT Goals       07/13/17 1508 07/12/17 0908       Transfer Training OT LTG    Transfer Training OT LTG, Date Established  07/12/17  -JR     Transfer Training OT LTG, Time to Achieve  1 wk  -JR     Transfer Training OT LTG, Activity Type  sit to stand/stand to sit  -JR     Transfer Training OT LTG, [place] Level  supervision required  -JR      Transfer Training OT LTG, Assist Device  other (see comments)   with appropriate AD  -JR     Transfer Training OT LTG, Outcome goal ongoing  -JR      Coordination OT LTG    Coordination OT LTG, Date Established  07/12/17  -JR     Coordination OT LTG, Time to Achieve  1 wk  -JR     Coordination OT LTG, Activity Type  FM written ex program  -JR     Coordination OT LTG, [place] Level  independent  -JR     Coordination OT LTG, Outcome goal ongoing  -JR      Functional Mobility OT LTG    Functional Mobility Goal OT LTG, Date Established  07/12/17  -JR     Functional Mobility Goal OT LTG, Time to Achieve  1 wk  -JR     Functional Mobility Goal OT LTG, [place] Level  contact guard  -JR     Functional Mobility Goal OT LTG, Assist Device  appropriate AD;on oxygen  -JR     Functional Mobility Goal OT LTG, Outcome goal ongoing  -JR        User Key  (r) = Recorded By, (t) = Taken By, (c) = Cosigned By    Initials Name Provider Type    JR Luann Forde OT Occupational Therapist          Occupational Therapy Education     Title: PT OT SLP Therapies (Active)     Topic: Occupational Therapy (Active)     Point: ADL training (Active)    Description: Instruct learner(s) on proper safety adaptation and remediation techniques during self care or transfers.   Instruct in proper use of assistive devices.    Learning Progress Summary    Learner Readiness Method Response Comment Documented by Status   Patient Acceptance E NR Educated pt and family regarding role of therapy and benefits of activity  07/13/17 1508 Active    Acceptance E NR Educated pt on role of therapy and benefits of activity.  07/12/17 0907 Active   Family Acceptance E NR Educated pt on role of therapy and benefits of activity.  07/12/17 0907 Active                      User Key     Initials Effective Dates Name Provider Type Discipline     06/22/15 -  Luann Forde OT Occupational Therapist OT                  OT Recommendation and  Plan  Anticipated Equipment Needs At Discharge:  (tba further)  Anticipated Discharge Disposition: inpatient rehabilitation facility  Planned Therapy Interventions: ADL retraining, balance training, bed mobility training, motor coordination training, transfer training  Therapy Frequency: daily  Plan of Care Review  Plan Of Care Reviewed With: patient, family  Progress: progress towards functional goals is fair  Outcome Summary/Follow up Plan: Pt more lethargic this date, requiring increased assist for bed mobility, transfers and functional mobility this date.        Outcome Measures       07/13/17 1439 07/12/17 0808 07/12/17 0807    How much help from another person do you currently need...    Turning from your back to your side while in flat bed without using bedrails?  3  -LS     Moving from lying on back to sitting on the side of a flat bed without bedrails?  3  -LS     Moving to and from a bed to a chair (including a wheelchair)?  3  -LS     Standing up from a chair using your arms (e.g., wheelchair, bedside chair)?  3  -LS     Climbing 3-5 steps with a railing?  2  -LS     To walk in hospital room?  3  -LS     AM-PAC 6 Clicks Score  17  -LS     How much help from another is currently needed...    Putting on and taking off regular lower body clothing? 2  -JR  3  -JR    Bathing (including washing, rinsing, and drying) 2  -JR  3  -JR    Toileting (which includes using toilet bed pan or urinal) 2  -JR  3  -JR    Putting on and taking off regular upper body clothing 3  -JR  3  -JR    Taking care of personal grooming (such as brushing teeth) 3  -JR  3  -JR    Eating meals 3  -JR  3  -JR    Score 15  -JR  18  -JR    Modified Turner Scale    Modified Turner Scale 3 - Moderate disability.  Requiring some help, but able to walk without assistance.  -JR 3 - Moderate disability.  Requiring some help, but able to walk without assistance.  -LS 3 - Moderate disability.  Requiring some help, but able to walk without  assistance.  -JR    Functional Assessment    Outcome Measure Options AM-PAC 6 Clicks Daily Activity (OT);Modified [place]  -JR AM-PAC 6 Clicks Basic Mobility (PT);Modified [place]  -LS AM-PAC 6 Clicks Daily Activity (OT);Modified [place]  -JR      User Key  (r) = Recorded By, (t) = Taken By, (c) = Cosigned By    Initials Name Provider Type    JR Luann Forde OT Occupational Therapist    KWESI Peña, PT Physical Therapist           Time Calculation:         Time Calculation- OT       07/13/17 1510          Time Calculation- OT    OT Start Time 1439  -      Total Timed Code Minutes- OT 12 minute(s)  -      OT Received On 07/13/17  -        User Key  (r) = Recorded By, (t) = Taken By, (c) = Cosigned By    Initials Name Provider Type    JR Luann Forde OT Occupational Therapist           Therapy Charges for Today     Code Description Service Date Service Provider Modifiers Qty    96690314406 HC OT EVAL LOW COMPLEXITY 4 7/12/2017 Luann ALAS Maurisio, OT GO 1    38894020521 HC OT CARE PLAN EA 15 MIN 7/13/2017 Luann Forde, OT GO 1    09064859702 HC OT THERAPEUTIC ACT EA 15 MIN 7/13/2017 Luann Forde, OT GO 1               Luann Forde, OT  7/13/2017

## 2017-07-13 NOTE — PLAN OF CARE
Problem: Patient Care Overview (Adult)  Goal: Plan of Care Review  Outcome: Ongoing (interventions implemented as appropriate)    07/13/17 1508   Coping/Psychosocial Response Interventions   Plan Of Care Reviewed With patient;family   Patient Care Overview   Progress progress towards functional goals is fair   Outcome Evaluation   Outcome Summary/Follow up Plan Pt more lethargic this date, requiring increased assist for bed mobility, transfers and functional mobility this date.         Problem: Stroke (Ischemic) (Adult)  Goal: Signs and Symptoms of Listed Potential Problems Will be Absent or Manageable (Stroke)  Outcome: Ongoing (interventions implemented as appropriate)    07/13/17 1508   Stroke (Ischemic)   Problems Assessed (Stroke (Ischemic)/TIA) motor/sensory impairment   Problems Present (Stroke (Ischemic)/TIA) motor/sensory impairment         Problem: Inpatient Occupational Therapy  Goal: Transfer Training Goal 1 LTG- OT  Outcome: Ongoing (interventions implemented as appropriate)    07/12/17 0908 07/13/17 1508   Transfer Training OT LTG   Transfer Training OT LTG, Date Established 07/12/17 --    Transfer Training OT LTG, Time to Achieve 1 wk --    Transfer Training OT LTG, Activity Type sit to stand/stand to sit --    Transfer Training OT LTG, [place] Level supervision required --    Transfer Training OT LTG, Assist Device other (see comments)  (with appropriate AD) --    Transfer Training OT LTG, Outcome --  goal ongoing       Goal: Coordination Goal LTG- OT  Outcome: Ongoing (interventions implemented as appropriate)    07/12/17 0908 07/13/17 1508   Coordination OT LTG   Coordination OT LTG, Date Established 07/12/17 --    Coordination OT LTG, Time to Achieve 1 wk --    Coordination OT LTG, Activity Type FM written ex program --    Coordination OT LTG, [place] Level independent --    Coordination OT LTG, Outcome --  goal ongoing       Goal: Functional Mobility Goal LTG- OT  Outcome: Ongoing  (interventions implemented as appropriate)    07/12/17 0908 07/13/17 1508   Functional Mobility OT LTG   Functional Mobility Goal OT LTG, Date Established 07/12/17 --    Functional Mobility Goal OT LTG, Time to Achieve 1 wk --    Functional Mobility Goal OT LTG, [place] Level contact guard --    Functional Mobility Goal OT LTG, Assist Device appropriate AD;on oxygen --    Functional Mobility Goal OT LTG, Outcome --  goal ongoing

## 2017-07-13 NOTE — PROGRESS NOTES
CTS Progress Note       LOS: 2 days   Patient Care Team:  HEBERT Montanez as PCP - General (Family Medicine)  LINDA Smith as Physician Assistant (Cardiology)  Tyler Marie MD as Consulting Physician (Cardiology)        Vital Signs  Temp:  [96.8 °F (36 °C)-98.4 °F (36.9 °C)] 98.4 °F (36.9 °C)  Heart Rate:  [] 128  Resp:  [16-22] 22  BP: ()/() 103/67    Physical Exam: Patient now heavily sedated.  Neck incision satisfactory without swelling or edema       Results     Results from last 7 days  Lab Units 07/13/17  0447   WBC 10*3/mm3 9.89   HEMOGLOBIN g/dL 8.9*   HEMATOCRIT % 26.7*   PLATELETS 10*3/mm3 204       Results from last 7 days  Lab Units 07/13/17  0447   SODIUM mmol/L 135   POTASSIUM mmol/L 3.8   CHLORIDE mmol/L 107   CO2 mmol/L 22.0   BUN mg/dL 26*   CREATININE mg/dL 1.50*   GLUCOSE mg/dL 99   CALCIUM mg/dL 9.0           Imaging Results (last 24 hours)     Procedure Component Value Units Date/Time    CT Angiogram Neck With & Without Contrast [[id number]] Collected:  07/11/17 1642     Updated:  07/12/17 0928    Narrative:       EXAMINATION: CT ANGIOGRAM NECK W WO CONTRAST- 0711/2017     INDICATION: Code 19; G45.8-Other transient cerebral ischemic attacks and  related syndromes; I77.1-Stricture of artery         TECHNIQUE: CT angiogram of the neck was performed with images acquired  prior to and following intravenous contrast.     The radiation dose reduction device was turned on for each scan per the  ALARA (As Low as Reasonably Achievable) protocol.     COMPARISON: NONE     FINDINGS: There is calcification of the carotid bifurcations rendering  accurate stenosis somewhat difficult. On the right there is calcific  stenosis estimated to measure 50%. The left carotid bifurcation  demonstrates minimal calcification with no stenosis. Furthermore there  is a graft from the left common carotid artery to the left subclavian  artery. This appears patent without stenosis or occlusion.  There is air  at the surgical site on the left.  Lastly there is a large right thyroid  nodule.       Impression:       1. Approximately 50% calcific stenosis of the right carotid bifurcation.  2. Lesser, noncritical stenosis of the left carotid bifurcation.  3. 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     D:  07/11/2017  E:  07/11/2017        This report was finalized on 7/12/2017 9:26 AM by Dr. Dani Guzman MD.             Assessment    Principal Problem:    Subclavian steal syndrome  Active Problems:    Hyperlipidemia    Hypertension    Hypothyroidism    GERD (gastroesophageal reflux disease)    Tobacco abuse    COPD (chronic obstructive pulmonary disease)    H/O CVA (cerebral vascular accident)    Bilateral Carotid Artery Stenosis    Nurses report patient was alert conversant with no neurologic deficits were walking in her hospital room last evening and talking with her family.  By 9 PM she became very confused somewhat combative and was given Haldol.  Since that time she has been heavily sedated but has developed new onset atrial fibrillation.  Cardizem was started per the intensivist.  On my arrival today patient is sedated the neck incision is in good order it's difficult to do a careful neurological assessment due to her sedation but nurses report she was completely neurologically intact last evening prior to the Haldol.  At this point we'll try to hold off on Haldol and Seroquel.  I have started amiodarone drip and instructed the nurses to discontinue the Cardizem as the amiodarone is started.    Plan   Amiodarone 150 mg IV over 30 minutes and then drip per protocol.  Discontinue Cardizem as soon as amiodarone boluses begun.  Discontinue IV morphine and all intravenous narcotics.  Lasix 20 mg IV ×1    Please note that portions of this note were completed with a voice recognition program. Efforts were made to edit the dictations, but occasionally words are mistranscribed.    Tyler Carranza,  MD  07/13/17  7:04 AM

## 2017-07-13 NOTE — THERAPY TREATMENT NOTE
Acute Care - Physical Therapy Treatment Note  [facility]     Patient Name: Kelly Uriostegui  : 1935  MRN: [id number]  Today's Date: 2017  Onset of Illness/Injury or Date of Surgery Date: 17  Date of Referral to PT: 17  Referring Physician: MD Phillip    Admit Date: 2017    Visit Dx:    ICD-10-CM ICD-9-CM   1. Impaired functional mobility, balance, gait, and endurance Z74.09 V49.89   2. Subclavian steal syndrome G45.8 435.2   3. Subclavian artery stenosis, left I77.1 447.1   4. Impaired mobility and ADLs Z74.09 799.89   5. Cognitive communication deficit R41.841 799.52     Patient Active Problem List   Diagnosis   • Essential hypertension   • Coronary artery disease involving native coronary artery of native heart without angina pectoris   • Palpitations   • Transient cerebral ischemia   • Carotid artery stenosis   • Carotid bruit   • Subclavian artery stenosis, left   • Subclavian steal syndrome   • Hyperlipidemia   • Hypertension   • Hypothyroidism   • GERD (gastroesophageal reflux disease)   • Tobacco abuse   • COPD (chronic obstructive pulmonary disease)   • H/O CVA (cerebral vascular accident)   • Bilateral Carotid Artery Stenosis               Adult Rehabilitation Note       17 1458 17 1439       Rehab Assessment/Intervention    Discipline physical therapist  -SJ occupational therapist  -JR     Document Type  therapy note (daily note)  -JR     Subjective Information  agree to therapy;complains of;fatigue  -JR     Symptoms Noted During/After Treatment  none  -JR     Precautions/Limitations fall precautions  -SJ fall precautions  -JR     Precautions/Limitations, Vision  vision impairment, bilaterally   worse in L eye per family  -JR     Recorded by [SJ] Tyra Frankel PT [JR] Luann Forde, OT     Vital Signs    Pre Systolic BP Rehab 181  -  -JR     Pre Treatment Diastolic BP 78  -SJ 78  -JR     Post Systolic BP Rehab 186  -  -JR     Post Treatment  Diastolic BP 78  -SJ 78  -JR     Pretreatment Heart Rate (beats/min) 62  -SJ 62  -JR     Posttreatment Heart Rate (beats/min) 62  -SJ 63  -JR     Pre SpO2 (%) 98  -SJ 99  -JR     O2 Delivery Pre Treatment room air  -SJ room air  -JR     Post SpO2 (%) 99  -  -JR     O2 Delivery Post Treatment room air  -SJ room air  -JR     Pre Patient Position Supine  -SJ Supine  -JR     Intra Patient Position Standing  -SJ Standing  -JR     Post Patient Position Supine  -SJ Supine  -JR     Recorded by [SJ] Tyra Frankel PT [JR] Luann Forde, OT     Pain Assessment    Pain Assessment No/denies pain  -SJ 0-10  -JR     Pain Score 0  -SJ 0  -JR     Post Pain Score 0  -SJ 0  -JR     Recorded by [] Tyra Frankel PT [JR] Luann Forde, OT     Cognitive Assessment/Intervention    Current Cognitive/Communication Assessment functional   increased lethargy today, required cues to stay awake  - functional   Pt very lethargic this date  -     Orientation Status oriented x 4  -SJ oriented x 4  -JR     Follows Commands/Answers Questions 75% of the time;able to follow single-step instructions;needs cueing;needs increased time;needs repetition  - 75% of the time;able to follow single-step instructions;needs cueing;needs increased time;needs repetition  -JR     Personal Safety mild impairment;decreased awareness, need for assist;decreased awareness, need for safety;decreased insight to deficits  -SJ mild impairment;decreased awareness, need for assist;decreased awareness, need for safety;decreased insight to deficits  -JR     Personal Safety Interventions fall prevention program maintained;gait belt;muscle strengthening facilitated;nonskid shoes/slippers when out of bed  -SJ      Recorded by [] Tyra Frankel PT [JR] Luann Forde, OT     Bed Mobility, Assessment/Treatment    Bed Mobility, Assistive Device head of bed elevated  -SJ head of bed elevated  -JR     Bed Mob, Supine to Sit, [place] moderate assist  (50% patient effort);2 person assist required  -SJ moderate assist (50% patient effort);2 person assist required  -JR     Bed Mob, Sit to Supine, [place] moderate assist (50% patient effort);2 person assist required  -SJ moderate assist (50% patient effort);2 person assist required  -JR     Bed Mobility, Safety Issues decreased use of arms for pushing/pulling;decreased use of legs for bridging/pushing  -SJ decreased use of arms for pushing/pulling;decreased use of legs for bridging/pushing  -JR     Bed Mobility, Impairments strength decreased;impaired balance;postural control impaired  -SJ strength decreased;impaired balance;postural control impaired  -JR     Recorded by [SJ] Tyra Frankel PT [JR] Luann Forde OT     Transfer Assessment/Treatment    Transfers, Sit-Stand [place] minimum assist (75% patient effort);2 person assist required  -SJ minimum assist (75% patient effort);2 person assist required  -JR     Transfers, Stand-Sit [place] minimum assist (75% patient effort);2 person assist required  -SJ minimum assist (75% patient effort);2 person assist required  -JR     Transfers, Sit-Stand-Sit, Assist Device rolling walker  -SJ rolling walker  -JR     Transfer, Safety Issues balance decreased during turns;step length decreased  -SJ balance decreased during turns;step length decreased  -JR     Transfer, Impairments strength decreased;impaired balance  -SJ strength decreased;impaired balance  -JR     Transfer, Comment cues for hand placement and safe t/f technique  - Verbal cues for hand placement and safe transfer techniques  -JR     Recorded by [SJ] Tyra Frankel PT [JR] Luann Forde, OT     Gait Assessment/Treatment    Gait, [place] Level minimum assist (75% patient effort);2 person assist required;verbal cues required  -SJ      Gait, Assistive Device rolling walker  -      Gait, Distance (Feet) 180  -SJ      Gait, Gait Pattern Analysis swing-through gait  -SJ      Gait,  Gait Deviations iza decreased;decreased heel strike;double stance time increased;forward flexed posture;narrow base  -      Gait, Safety Issues balance decreased during turns;step length decreased;weight-shifting ability decreased  -      Gait, Impairments strength decreased;impaired balance  -      Gait, Comment slow gait speed, requires physical assist with RW at times and cues to decrease lean to R  -SJ      Recorded by [SJ] Tyra Frankel, PT      Functional Mobility    Functional Mobility- Ind. Level  minimum assist (75% patient effort);2 person assist required  -     Functional Mobility- Device  rolling walker  -     Functional Mobility-Distance (Feet)  --   in hallway  -     Functional Mobility- Safety Issues  balance decreased during turns;step length decreased  -     Functional Mobility- Comment  Pt required assist to guide walker. Pt frequently letting go of walker, required verbal cues to keep hands on walker.  -JR     Recorded by  [JR] Luann Forde OT     Balance Skills Training    Sitting-Level of Assistance Minimum assistance  - Minimum assistance  -     Sitting-Balance Support Feet unsupported  - Feet unsupported  -     Standing-Level of Assistance Minimum assistance  - Minimum assistance  -     Static Standing Balance Support assistive device  - assistive device  -     Gait Balance-Level of Assistance Minimum assistance;x2  -SJ Minimum assistance;x2  -JR     Gait Balance Support assistive device  - assistive device  -     Recorded by [SJ] Tyra Frankel PT [JR] Luann Forde OT     Positioning and Restraints    Pre-Treatment Position in bed  - in bed  -     Post Treatment Position bed  - bed  -     In Bed notified nsg;supine;call light within reach;encouraged to call for assist;exit alarm on;with family/caregiver  - notified nsg;supine;call light within reach;encouraged to call for assist;exit alarm on;with family/caregiver  -JR     Recorded  by [SJ] Tyra Frankel, PT [JR] Luann ALAS Maurisio, OT       User Key  (r) = Recorded By, (t) = Taken By, (c) = Cosigned By    Initials Name Effective Dates    SJ Tyra Frankel, PT 06/19/15 -     JR Luann Forde, OT 06/22/15 -                 IP PT Goals       07/13/17 1531 07/12/17 0911       Bed Mobility PT LTG    Bed Mobility PT LTG, Date Established  07/12/17  -LS     Bed Mobility PT LTG, Time to Achieve  2 wks  -LS     Bed Mobility PT LTG, Activity Type  supine to sit/sit to supine  -LS     Bed Mobility PT LTG, [place] Level  independent  -LS     Bed Mobility PT LTG, Outcome goal ongoing  -SJ      Transfer Training PT LTG    Transfer Training PT LTG, Date Established  07/12/17  -LS     Transfer Training PT LTG, Time to Achieve  2 wks  -LS     Transfer Training PT LTG, Activity Type  sit to stand/stand to sit  -LS     Transfer Training PT LTG, [place] Level  conditional independence  -LS     Transfer Training PT LTG, Assist Device  walker, rolling  -LS     Transfer Training PT LTG, Outcome goal ongoing  -SJ      Gait Training PT LTG    Gait Training Goal PT LTG, Date Established  07/12/17  -LS     Gait Training Goal PT LTG, Time to Achieve  2 wks  -LS     Gait Training Goal PT LTG, [place] Level  supervision required  -LS     Gait Training Goal PT LTG, Assist Device  walker, rolling  -LS     Gait Training Goal PT LTG, Distance to Achieve  300  -LS     Gait Training Goal PT LTG, Outcome goal ongoing  -SJ      Stair Training PT LTG    Stair Training Goal PT LTG, Date Established  07/12/17  -LS     Stair Training Goal PT LTG, Time to Achieve  2 wks  -LS     Stair Training Goal PT LTG, Number of Steps  1  -LS     Stair Training Goal PT LTG, [place] Level  contact guard assist  -LS     Stair Training Goal PT LTG, Outcome goal ongoing  -SJ        User Key  (r) = Recorded By, (t) = Taken By, (c) = Cosigned By    Initials Name Provider Type    SJ Tyra Frankel, PT Physical Therapist    LS  Cecilia Peña, PT Physical Therapist          Physical Therapy Education     Title: PT OT SLP Therapies (Active)     Topic: Physical Therapy (Active)     Point: Mobility training (Active)    Learning Progress Summary    Learner Readiness Method Response Comment Documented by Status   Patient Acceptance E,D NR   07/13/17 1533 Active    Acceptance E,D NR  LS 07/12/17 0910 Active   Family Acceptance E,D NR  LS 07/12/17 0910 Active               Point: Body mechanics (Active)    Learning Progress Summary    Learner Readiness Method Response Comment Documented by Status   Patient Acceptance E,D NR  SJ 07/13/17 1533 Active    Acceptance E,D NR  LS 07/12/17 0910 Active   Family Acceptance E,D NR  LS 07/12/17 0910 Active               Point: Precautions (Active)    Learning Progress Summary    Learner Readiness Method Response Comment Documented by Status   Patient Acceptance E,D NR   07/13/17 1533 Active    Acceptance E,D NR  LS 07/12/17 0910 Active   Family Acceptance E,D NR  LS 07/12/17 0910 Active                      User Key     Initials Effective Dates Name Provider Type Discipline     06/19/15 -  Tyra Frankel, PT Physical Therapist PT     06/19/15 -  Cecilia Peña, PT Physical Therapist PT                    PT Recommendation and Plan  Anticipated Discharge Disposition: inpatient rehabilitation facility (pending functional progress and social support availability)  PT Frequency: daily  Plan of Care Review  Plan Of Care Reviewed With: patient  Progress: progress toward functional goals as expected  Outcome Summary/Follow up Plan: Pt limited by increased lethargy today. Pt required cues to stay awake. Pt amb 180ft with RW with [place] x2.          Outcome Measures       07/13/17 1458 07/13/17 1439 07/12/17 0808    How much help from another person do you currently need...    Turning from your back to your side while in flat bed without using bedrails? 2  -SJ  3  -LS    Moving from lying on back to sitting on  the side of a flat bed without bedrails? 2  -SJ  3  -LS    Moving to and from a bed to a chair (including a wheelchair)? 3  -SJ  3  -LS    Standing up from a chair using your arms (e.g., wheelchair, bedside chair)? 3  -SJ  3  -LS    Climbing 3-5 steps with a railing? 2  -SJ  2  -LS    To walk in hospital room? 3  -SJ  3  -LS    AM-PAC 6 Clicks Score 15  -SJ  17  -LS    How much help from another is currently needed...    Putting on and taking off regular lower body clothing?  2  -JR     Bathing (including washing, rinsing, and drying)  2  -JR     Toileting (which includes using toilet bed pan or urinal)  2  -JR     Putting on and taking off regular upper body clothing  3  -JR     Taking care of personal grooming (such as brushing teeth)  3  -JR     Eating meals  3  -JR     Score  15  -JR     Modified Garza Scale    Modified Marcel Scale 3 - Moderate disability.  Requiring some help, but able to walk without assistance.  -SJ 3 - Moderate disability.  Requiring some help, but able to walk without assistance.  -JR 3 - Moderate disability.  Requiring some help, but able to walk without assistance.  -LS    Functional Assessment    Outcome Measure Options AM-PAC 6 Clicks Daily Activity (OT);Modified Garza  -SJ AM-PAC 6 Clicks Daily Activity (OT);Modified Marcel  -JR AM-PAC 6 Clicks Basic Mobility (PT);Modified Garza  -LS      07/12/17 0807          How much help from another is currently needed...    Putting on and taking off regular lower body clothing? 3  -JR      Bathing (including washing, rinsing, and drying) 3  -JR      Toileting (which includes using toilet bed pan or urinal) 3  -JR      Putting on and taking off regular upper body clothing 3  -JR      Taking care of personal grooming (such as brushing teeth) 3  -JR      Eating meals 3  -JR      Score 18  -JR      Modified Marcel Scale    Modified Garza Scale 3 - Moderate disability.  Requiring some help, but able to walk without assistance.  -JR       Functional Assessment    Outcome Measure Options AM-PAC 6 Clicks Daily Activity (OT);Marquez Rod  -        User Key  (r) = Recorded By, (t) = Taken By, (c) = Cosigned By    Initials Name Provider Type    MITZY Frankel, PT Physical Therapist    JR Luann Forde, OT Occupational Therapist    KWESI Peña, PT Physical Therapist           Time Calculation:         PT Charges       07/13/17 1533          Time Calculation    Start Time 1458  -      PT Received On 07/13/17  -      PT Goal Re-Cert Due Date 07/22/17  -      Time Calculation- PT    Total Timed Code Minutes- PT 15 minute(s)  -        User Key  (r) = Recorded By, (t) = Taken By, (c) = Cosigned By    Initials Name Provider Type    MITZY Frankel, PT Physical Therapist          Therapy Charges for Today     Code Description Service Date Service Provider Modifiers Qty    [id number] HC GAIT TRAINING EA 15 MIN 7/13/2017 Tyra Frankel PT GP 1          PT G-Codes  Outcome Measure Options: AM-PAC 6 Clicks Daily Activity (OT), Marquez Frankel PT  7/13/2017

## 2017-07-13 NOTE — MBS/VFSS/FEES
Acute Care - Speech Language Pathology   Swallow Re-Evaluation  Tahir   Clinical Swallow Re-Evaluation  Fiberoptic Endoscopic Evaluation of Swallowing (FEES)     Patient Name: Kelly Uriostegui  : 1935  MRN: [id number]  Today's Date: 2017  Onset of Illness/Injury or Date of Surgery Date: 17            Admit Date: 2017    Visit Dx:     ICD-10-CM ICD-9-CM   1. Impaired functional mobility, balance, gait, and endurance Z74.09 V49.89   2. Subclavian steal syndrome G45.8 435.2   3. Subclavian artery stenosis, left I77.1 447.1   4. Impaired mobility and ADLs Z74.09 799.89   5. Cognitive communication deficit R41.841 799.52   6. Pharyngeal dysphagia R13.13 787.23     Patient Active Problem List   Diagnosis   • Essential hypertension   • Coronary artery disease involving native coronary artery of native heart without angina pectoris   • Palpitations   • Transient cerebral ischemia   • Carotid artery stenosis   • Carotid bruit   • Subclavian artery stenosis, left   • Subclavian steal syndrome   • Hyperlipidemia   • Hypertension   • Hypothyroidism   • GERD (gastroesophageal reflux disease)   • Tobacco abuse   • COPD (chronic obstructive pulmonary disease)   • H/O CVA (cerebral vascular accident)   • Bilateral Carotid Artery Stenosis     Past Medical History:   Diagnosis Date   • Anemia    • Carotid stenosis    • Coronary artery disease    • CVA (cerebral vascular accident)    • GERD (gastroesophageal reflux disease)    • Hyperlipidemia    • Hypertension    • Hypothyroidism    • Macular degeneration      Past Surgical History:   Procedure Laterality Date   • CARDIAC CATHETERIZATION     • COLONOSCOPY      5-10 years ago   • EYE SURGERY      bilateral cataracts removed   • GALLBLADDER SURGERY     • HYSTERECTOMY     • AL VEIN BYPASS GRAFT,CAROTI-SUBCL Left 2017    Procedure: LEFT CAROTID ENDARTERCTOMY WITH PATCH, LEFT CAROTID SUBCLAVIAN BYPASS;  Surgeon: Tyler Carranza MD;  Location: [facility]  OR;  Service: Vascular   • THYROID SURGERY            SWALLOW EVALUATION (last 72 hours)      Swallow Evaluation       07/13/17 1440 07/11/17 1630             Rehab Evaluation    Document Type re-evaluation  - evaluation  -SM       Subjective Information agree to therapy  - no complaints  -       General Information    Patient Profile Review  yes  -SM       Subjective Patient Observations Alert and cooperative  -SM Alert and cooperative  -       Pertinent History Of Current Problem RN requested re-eval 2' increased weakness, general cognitive change today.    - Code 19 called post-op, w/u pending  -       Current Diet Limitations NPO   RN holding trays  -SM NPO  -SM       Prior Level of Function- Swallowing  safe, efficient swallowing in all situations  -       Plans/Goals Discussed With patient and family;agreed upon  - patient and family;agreed upon  -       Barriers to Rehab none identified  - none identified  -       Clinical Impression    Patient's Goals For Discharge return to PO diet;eat/drink without coughing/choking  - patient did not state  -       Family Goals For Discharge patient able to return to PO diet;patient able to eat/drink without coughing/choking  -        Rehab Potential/Prognosis, Swallowing good, to achieve stated therapy goals  -        Criteria for Skilled Therapeutic Interventions Met skilled criteria for dysphagia intervention met  -SM        Therapy Frequency 5 times/wk  -SM        SLP Diet Recommendation soft textures;thin liquids;advance diet as tolerated  - regular textures;thin liquids  -       Recommended Feeding/Eating Techniques maintain upright posture during/after eating for 30 mins;small sips/bites  - maintain upright posture during/after eating for 30 mins  -       SLP Rec. for Method of Medication Administration meds whole in pudding/applesauce  - meds whole with thin liquid  -       Pain Assessment    Pain Assessment No/denies pain   -SM No/denies pain  -       Cognitive Assessment/Intervention    Orientation Status oriented x 4  -        Follows Commands/Answers Questions 75% of the time;able to follow single-step instructions;needs increased time;needs repetition  -        Oral Motor Structure and Function    Oral Musculature General Assessment lingual impairment  -        Lingual Strength and Mobility reduced strength bilaterally  -        Clinical Swallow Exam    Oral Phase Results  intact oral phase without signs of dysfunction  -       Pharyngeal Phase Results sign/symptoms of pharyngeal impairment;throat clear;multiple swallows  - no signs/symptoms of pharyngeal impairment  -       Summary of Clinical Exam Dysphagia Re-eval completed per RN request 2' concern for decline. Demonstrating increased generalized weakness, weak voice, throat clearing and multiple swallows with PO trials. Pt agreeable for FEES which demonstrated: Mild dysphagia.  Large red edematous area extending from L arytenoid laterally down arytepiglottic fold and extending into laryngeal vestibule.  Explains pt's odynophagia.  Shown to MD (Frieda) who was not concerned, as expected following L CEA/intubation. No aspiration with any consistency.  Penetration during and after the swallow with thin liquids 2' reduced vestibular closure (mild weakness + edema contributing). Pt senses and cleears with throat clearing and subsequent swallowing. Mild diffuse pharyngeal residue, greatest with solids in valleculae - clears with subsequent swallows.  Recommend: Soft/whole solids, thin liquids, ok straws, general aspiration precautions.  Meds whole in applesauce or even ice cream.  SLP will follow short-term for use of aspiration precautions, increase to regular solids as appropriate and determine if would benefit from pharyngeal strengthening vs time to resolve.  Thanks  - Swallow Eval completed. Swallow WFL, no s/s aspiration. Safe with regular diet and thin  liquids. Will f/u for s/l eval tomorrow  -SM       Dysphagia Treatment Objectives and Progress    Dysphagia Treatment Objectives Other 1;Other 2  -SM        Dysphagia Other 1    Dysphagia Other 1 Objective Tolerate diet without s/s aspiration with 100% accuracy and no cues for aspiration precautions  -SM        Status: Dysphagia Other 1 New  -SM        Dysphagia Other 1 Progress continue to address  -SM        Dysphagia Other 2    Dysphagia Other 2 Objective Tolerate trials regular solids without signs of difficulty with 100% accuracy and no cues  -SM        Status: Dysphagia Other 2 New  -SM        Dysphagia Other 2 Progress continue to address  -SM          User Key  (r) = Recorded By, (t) = Taken By, (c) = Cosigned By    Initials Name Effective Dates    JORGE Nj, MS CCC-SLP 06/22/15 -         EDUCATION  The patient has been educated in the following areas:   Dysphagia (Swallowing Impairment) Modified Diet Instruction.    SLP Recommendation and Plan     SLP Diet Recommendation: soft textures, thin liquids, advance diet as tolerated  Recommended Feeding/Eating Techniques: maintain upright posture during/after eating for 30 mins, small sips/bites  SLP Rec. for Method of Medication Administration: meds whole in pudding/applesauce        Criteria for Skilled Therapeutic Interventions Met: skilled criteria for dysphagia intervention met     Rehab Potential/Prognosis, Swallowing: good, to achieve stated therapy goals  Therapy Frequency: 5 times/wk             Plan of Care Review  Plan Of Care Reviewed With: patient, family  Outcome Summary/Follow up Plan: Dysphagia Re-eval completed per RN request 2' concern for decline. Demonstrating increased generalized weakness, weak voice, throat clearing and multiple swallows with PO trials. Pt agreeable for FEES which demonstrated: Mild dysphagia.  Large red edematous area extending from L arytenoid laterally down arytepiglottic fold and extending into laryngeal  vestibule.  Explains pt's odynophagia.  Shown to MD (Frieda) who was not concerned, as expected following L CEA/intubation. No aspiration with any consistency.  Penetration during and after the swallow with thin liquids 2' reduced vestibular closure (mild weakness + edema contributing). Pt senses and cleears with throat clearing and subsequent swallowing. Mild diffuse pharyngeal residue, greatest with solids in valleculae - clears with subsequent swallows.  Recommend: Soft/whole solids, thin liquids, ok straws, general aspiration precautions.  Meds whole in applesauce or even ice cream.  SLP will follow short-term for use of aspiration precautions, increase to regular solids as appropriate and determine if would benefit from pharyngeal strengthening vs time to resolve.  Thanks          IP SLP Goals       07/13/17 1557 07/12/17 1150       Safely Consume Diet    Safely Consume Diet- SLP, Date Established 07/13/17  -      Safely Consume Diet- SLP, Time to Achieve by discharge  -      Safely Consume Diet- SLP, Outcome goal ongoing  -      Expressive- Optimal Participation in Care    Expressive Optimal Participation in Care- SLP, Date Established  07/12/17  -AS     Expressive Optimal Participation in Care- SLP, Time to Achieve  by discharge  -AS     Cognitive Linguistic- Optimal Participation in Care    Cognitive Linguistic Optimal Participation in Care- SLP, Date Established  07/12/17  -AS     Cognitive Linguistic Optimal Participation in Care- SLP, Time to Achieve  by discharge  -AS       User Key  (r) = Recorded By, (t) = Taken By, (c) = Cosigned By    Initials Name Provider Type    JORGE Nj, MS CCC-SLP Speech and Language Pathologist    AS Jessica Paredes, MS CCC-SLP Speech and Language Pathologist               Time Calculation:         Time Calculation- SLP       07/13/17 1558          Time Calculation- SLP    SLP Start Time 1440  -      SLP Received On 07/13/17  -        User Key  (r)  = Recorded By, (t) = Taken By, (c) = Cosigned By    Initials Name Provider Type     Irma Nj, MS CCC-SLP Speech and Language Pathologist          Therapy Charges for Today     Code Description Service Date Service Provider Modifiers Qty    44180115799 HC ST EVAL ORAL PHARYNG SWALLOW 3 7/13/2017 Irma Nj MS CCC-SLP GN 1    49514029371 HC ST FIBEROPTIC ENDO EVAL SWALL 4 7/13/2017 Irma Nj MS CCC-SLP GN 1               Irma Nj, MS SHELIA-SLP  7/13/2017

## 2017-07-13 NOTE — PLAN OF CARE
Problem: Patient Care Overview (Adult)  Goal: Plan of Care Review  Outcome: Ongoing (interventions implemented as appropriate)    07/13/17 0813   Coping/Psychosocial Response Interventions   Plan Of Care Reviewed With patient   Patient Care Overview   Progress declining   Outcome Evaluation   Outcome Summary/Follow up Plan Pt remains extremely confused, agitated, and restless. Went into A.Fib w RVR around midnight. Cardizem gtt initiated. Dr. Carranza notified at bedside this am. He wants to stop Cardizem and start Amiodarone. HR 60's-130's. 's-150's. Pt is moving all extremities. Facial drooping and slurred speech continue. Morphine discontinued and was not given during nightshift. Incontinent x 5 episodes. Daughter at bedside this morning. Pt up in chair.          Problem: Skin Integrity Impairment, Risk/Actual (Adult)  Goal: Skin Integrity/Wound Healing  Outcome: Ongoing (interventions implemented as appropriate)    Problem: Pressure Ulcer (Adult)  Goal: Signs and Symptoms of Listed Potential Problems Will be Absent or Manageable (Pressure Ulcer)  Outcome: Ongoing (interventions implemented as appropriate)    Problem: Stroke (Ischemic) (Adult)  Goal: Signs and Symptoms of Listed Potential Problems Will be Absent or Manageable (Stroke)  Outcome: Ongoing (interventions implemented as appropriate)    Problem: SAFETY - NON-VIOLENT RESTRAINT  Goal: Remains free of injury from restraints (Non-Violent Restraint)  Outcome: Ongoing (interventions implemented as appropriate)  Goal: Free from restraint(s) (Non-Violent Restraint)  Outcome: Ongoing (interventions implemented as appropriate)

## 2017-07-13 NOTE — PLAN OF CARE
Problem: Patient Care Overview (Adult)  Goal: Plan of Care Review  Outcome: Ongoing (interventions implemented as appropriate)    07/13/17 1557   Coping/Psychosocial Response Interventions   Plan Of Care Reviewed With patient;family   Outcome Evaluation   Outcome Summary/Follow up Plan Dysphagia Re-eval completed per RN request 2' concern for decline. Demonstrating increased generalized weakness, weak voice, throat clearing and multiple swallows with PO trials. Pt agreeable for FEES which demonstrated: Mild dysphagia. Large red edematous area extending from L arytenoid laterally down arytepiglottic fold and extending into laryngeal vestibule. Explains pt's odynophagia. Shown to MD (Frieda) who was not concerned, as expected following L CEA/intubation. No aspiration with any consistency. Penetration during and after the swallow with thin liquids 2' reduced vestibular closure (mild weakness + edema contributing). Pt senses and cleears with throat clearing and subsequent swallowing. Mild diffuse pharyngeal residue, greatest with solids in valleculae - clears with subsequent swallows. Recommend: Soft/whole solids, thin liquids, ok straws, general aspiration precautions. Meds whole in applesauce or even ice cream. SLP will follow short-term for use of aspiration precautions, increase to regular solids as appropriate and determine if would benefit from pharyngeal strengthening vs time to resolve. Thanks         Problem: Inpatient SLP  Goal: Dysphagia- Patient will safely consume diet as per recommendation with no signs/symptoms of aspiration  Outcome: Ongoing (interventions implemented as appropriate)    07/13/17 7267   Safely Consume Diet   Safely Consume Diet- SLP, Date Established 07/13/17   Safely Consume Diet- SLP, Time to Achieve by discharge   Safely Consume Diet- SLP, Outcome goal ongoing

## 2017-07-13 NOTE — PROGRESS NOTES
Neurology       Patient Care Team:  HEBERT Montanez as PCP - General (Family Medicine)  LINDA Smith as Physician Assistant (Cardiology)  Tyler Marie MD as Consulting Physician (Cardiology)    Chief complaint: TIA    History:  Patient became agitated last night and developed atrial fibrillation.    She got 4 mg of Haldol and 25 mg of Seroquel before, down.  The Haldol apparently had minimal effect.    She's been groggy this morning but now is alert and cooperative  Past Medical History:   Diagnosis Date   • Anemia    • Carotid stenosis    • Coronary artery disease    • CVA (cerebral vascular accident)    • GERD (gastroesophageal reflux disease)    • Hyperlipidemia    • Hypertension    • Hypothyroidism    • Macular degeneration        Vital Signs   Vitals:    07/13/17 0730 07/13/17 0800 07/13/17 0815 07/13/17 0900   BP: 135/62 120/74 158/56 146/63   BP Location:       Patient Position:       Pulse: 70 71 73 62   Resp:  18     Temp:  98.4 °F (36.9 °C)     TempSrc:  Tympanic     SpO2: 99% 93% 95%    Weight:       Height:           Physical Exam:   General: Calm no distress              Neuro: Alert and oriented to person place and time.    Speech is articulate.    Motor testing shows symmetrical strength and tone.    Cranial nerves show persisting left central facial weakness.        Results Review:  Reviewed    Results from last 7 days  Lab Units 07/13/17  0447   WBC 10*3/mm3 9.89   HEMOGLOBIN g/dL 8.9*   HEMATOCRIT % 26.7*   PLATELETS 10*3/mm3 204       Results from last 7 days  Lab Units 07/13/17  0447 07/12/17  0502 07/10/17  1419   SODIUM mmol/L 135 132 138   POTASSIUM mmol/L 3.8 4.4 4.8   CHLORIDE mmol/L 107 107 102   CO2 mmol/L 22.0 23.0 24.0   BUN mg/dL 26* 30* 35*   CREATININE mg/dL 1.50* 1.60* 1.90*   CALCIUM mg/dL 9.0 8.1* 9.6   GLUCOSE mg/dL 99 114* 89     Imaging Results (last 24 hours)     ** No results found for the last 24 hours. **          Assessment:  Agitation, improved    New onset  atrial fibrillation.    Old left parietal infarct        Plan:  Question now is what to do about the atrial fibrillation sister chads score would justify anticoagulation.    Discontinue Haldol    Seroquel 50 mg daily at bedtime.    Ativan a milligram every 4 hours as needed for agitation intravenously.    Comment:  Need to coordinate that with Dr. Fitzpatrick and Dr. Carranza         I discussed the patients findings and my recommendations with patient, family and nursing staff    Manuel Lopez MD  07/13/17  11:27 AM

## 2017-07-13 NOTE — PROGRESS NOTES
"INTENSIVIST   PROGRESS NOTE     Hospital:  LOS: 2 days     Ms. Kelly Uriostegui, 82 y.o. female is followed for:      Subclavian steal syndrome    Hypertension    As an Intensivist, we provide an integrated approach to the ICU patient and family, medical management of comorbid conditions, lead interdisciplinary rounds and coordinate the care with all other services, including those from other specialists.     Subjective   S     Mrs. Uriostegui is an 83 y/o WF who was admitted today for an elective left internal carotid endarterectomy and left common carotid to left subclavian artery bypass by Dr. Carranza today.  She is a smoker of 1/2 PPD. She states she is trying to quit smoking.  She is not a diabetic.     Interval History:  POD: 2 Days Post-Op  Confused and restless during the night.  Difficulty standing up. Weak.  She feels \"terrible\" (her own words).  She did not sleep last night.        ROS:   No fevers.  No chest pain.  No dyspnea.  No nausea, vomiting or diarrhea.    Objective   O     Vitals:    Temp  Min: 96.8 °F (36 °C)  Max: 98.4 °F (36.9 °C)  BP  Min: 93/61  Max: 163/80  Pulse  Min: 62  Max: 128  Resp  Min: 16  Max: 22  SpO2  Min: 82 %  Max: 99 % Flow (L/min)  Min: 1  Max: 2    Medications (drips):    amiodarone Last Rate: 1 mg/min (07/13/17 0843)   Followed by    amiodarone    phenylephrine (SLIME-SYNEPHRINE) 50 mg/250 (0.2 mg/mL) infusion    sodium chloride Last Rate: Stopped (07/12/17 1400)     Physical Examination    Telemetry:    Atrial Rhythm: atrial fibrillation   Constitutional:  No acute distress.   HEENT: Normocephalic and atraumatic.    Left neck dressing.   Cardiovascular: IRR. Normal heart sounds.   Respiratory: No respiratory distress. Normal respiratory effort.  Normal BS.   Abdominal:  Soft. No masses. Non-tender. No distension. No HSM.   Extremities: No digital cyanosis. No clubbing.   Neurological:   Alert and Oriented to person, place, and time.  Best Eye Response: 3-->(E3) to " speech  Best Motor Response: 5-->(M5) localizes pain  Best Verbal Response: 4-->(V4) confused  [place] Coma Scale Score: 12   Left sided lip still weak.   Psychiatric:  Normal affect. Normal behavior.   Lines/Drains/Airways: PIV x2       Results from last 7 days  Lab Units 07/13/17  0447 07/12/17  0502 07/10/17  1419   WBC 10*3/mm3 9.89 13.30* 7.92   HEMOGLOBIN g/dL 8.9* 8.9* 11.0*   MCV fL 87.8 92.2 93.3   PLATELETS 10*3/mm3 204 201 255       Results from last 7 days  Lab Units 07/13/17  0447 07/12/17  0502 07/10/17  1419   SODIUM mmol/L 135 132 138   POTASSIUM mmol/L 3.8 4.4 4.8   CO2 mmol/L 22.0 23.0 24.0   CREATININE mg/dL 1.50* 1.60* 1.90*     Estimated Creatinine Clearance: 22 mL/min (by C-G formula based on Cr of 1.5).         CXR 7/10/17 Emphysema, no infiltrates    Results: Reviewed.  I reviewed the patient's new laboratory and imaging results.  I independently reviewed the patient's new images.    Medications: Reviewed.    Assessment/Plan   A / P     82 y.o.female, admitted on 7/11/2017 with Subclavian artery stenosis, left [I77.1]  Subclavian steal syndrome [G45.8]:     1. Left sided carotid artery stenois and left Subclavian Steal Syndrome (symptomatic: falling)  Procedure(s) (LRB):  LEFT CAROTID ENDARTERCTOMY WITH PATCH, LEFT CAROTID SUBCLAVIAN BYPASS (Left)   Dr. Tyler Carranza (Cardiothoracic Surgery)  7/11/2017  2. Bilateral Carotid Artery Stenosis  1. CTA Neck 3/2017 revealed 60% stenosis Rt ICA, 50% stenosis Lt ICA  3. H/O CVA  1. Parietal (unknown side) per Cardiology record  2. On Plavix  4. Probably TIA post op as per Neuro's note.  5. Hyperlipidemia  1. Started on Lipitor  6. HTN  1. Atenolol, Cozaar, Maxzide at home. Norvasc (started this admission)  7. COPD  1. Tobacco Abuse - Last cigarette Sat 7/9/2017  8. GERD  9. Hypothyroidism  1. Not on replacement  10. No history of DM    Lab Value Date/Time   HGBA1C 5.60 07/10/2017 1419      Nutrition:   Diet Regular; Thin; Cardiac, Consistent  Carbohydrate  Advance Directives: Full Code    Assessment / Plan:    1. ICU medical management post surgery.  2. Creatinine better. Down to 1.6  3. Now on AMIODARONE gtt for A Fib.  4. Continue DAPT, Lipitor.    Yonatan Malave MD, FACP, FCCP, [facility]    Intensive Care Medicine and Pulmonary Medicine

## 2017-07-13 NOTE — PROGRESS NOTES
Adult Nutrition  Assessment/PES    Patient Name:  Kelly Uriostegui  YOB: 1935  MRN: [id number]  Admit Date:  7/11/2017    Assessment Date:  7/13/2017        Reason for Assessment       07/13/17 1552    Reason for Assessment    Reason For Assessment/Visit multidisciplinary rounds    Identified At Risk By Screening Criteria BMI    Time Spent (min) 30    Diagnosis --   per notes of this adm              Nutrition/Diet History       07/13/17 1553    Nutrition/Diet History    Reported/Observed By RN    Other pt w/ difficulty swallowing a pill this am; SLP consulted              Labs/Tests/Procedures/Meds       07/13/17 1553    Labs/Tests/Procedures/Meds    Labs/Tests Review Reviewed    Procedure Review SLP    Swallow eval status Done    Type of SLP Evaluation --   FEES                Nutrition Prescription Ordered       07/13/17 1554    Nutrition Prescription PO    Current PO Diet Soft Texture    Texture Whole foods    Fluid Consistency Thin    Common Modifiers Cardiac;Consistent Carbohydrate            Evaluation of Received Nutrient/Fluid Intake       07/13/17 1556    PO Evaluation    Number of Days PO Intake Evaluated Insufficient Data              Problem/Interventions:                  Intervention Goal       07/13/17 1556    Intervention Goal    General Nutrition support treatment    PO Establish PO;Tolerate PO;Modify texture/consistency;Increase intake            Nutrition Intervention       07/13/17 1556    Nutrition Intervention    RD/Tech Action Recommend/ordered;Follow Tx progress;Care plan reviewd    Recommended/Ordered Supplement            Nutrition Prescription       07/13/17 1556    Nutrition Prescription PO    PO Prescription Begin/change supplement    Supplement Boost Plus    Supplement Frequency 2 times a day    New PO Prescription Ordered? Yes            Education/Evaluation       07/13/17 1557    Monitor/Evaluation    Monitor Per protocol;PO intake;Pertinent labs;Weight         Comments:  Nutrition supplement provided twice daily.    Electronically signed by:  Marva Cash RD  07/13/17 3:57 PM

## 2017-07-13 NOTE — SIGNIFICANT NOTE
" height is 64\" (162.6 cm) and weight is 106 lb (48.1 kg). Her oral temperature is 98.3 °F (36.8 °C). Her blood pressure is 152/102 (abnormal) and her pulse is 93- 130s. Her respiration is 20 and oxygen saturation is 92%.     New onset AF    BB q5 min x 3 doses  if BP tolerates it to achieve rate control.   If becomes hypotensive will utilize Amio.    If remains tachy w/ Hypertension after BB, will initiate Cardizem (EF >70%)   "

## 2017-07-13 NOTE — PROGRESS NOTES
[facility]       LOS: 2 days   Patient Care Team:  HEBERT Montanez as PCP - General (Family Medicine)  LINDA Smith as Physician Assistant (Cardiology)  Tyler Marie MD as Consulting Physician (Cardiology)        Subjective       Patient Denies:  Confused/sedated      Vital Signs  Temp:  [96.8 °F (36 °C)-98.4 °F (36.9 °C)] 98.4 °F (36.9 °C)  Heart Rate:  [] 62  Resp:  [16-22] 18  BP: ()/() 146/63    Intake/Output Summary (Last 24 hours) at 07/13/17 0929  Last data filed at 07/13/17 0600   Gross per 24 hour   Intake            128.2 ml   Output             1800 ml   Net          -1671.8 ml          Physical Exam:     General Appearance:    confused, in no acute distress       Neck:   No adenopathy, supple, trachea midline, no JVD       Lungs:     Clear to auscultation anteriorly,respirations regular, even and                  unlabored    Heart:    Regular rhythm and normal rate, normal S1 and S2, no            murmur, no gallop, no rub, no click   Chest Wall:    No abnormalities observed   Abdomen:     Normal bowel sounds, no masses, no organomegaly, soft              Extremities:   no edema, no cyanosis, no redness   Pulses:   Pulses palpable and equal bilaterally     Results Review:     I reviewed the patient's new clinical results.      WBC WBC   Date/Time Value Ref Range Status   07/13/2017 0447 9.89 3.50 - 10.80 10*3/mm3 Final   07/12/2017 0502 13.30 (H) 3.50 - 10.80 10*3/mm3 Final   07/10/2017 1419 7.92 3.50 - 10.80 10*3/mm3 Final            HGB Hemoglobin   Date/Time Value Ref Range Status   07/13/2017 0447 8.9 (L) 11.5 - 15.5 g/dL Final   07/12/2017 0502 8.9 (L) 11.5 - 15.5 g/dL Final   07/10/2017 1419 11.0 (L) 11.5 - 15.5 g/dL Final           HCT Hematocrit   Date/Time Value Ref Range Status   07/13/2017 0447 26.7 (L) 34.5 - 44.0 % Final   07/12/2017 0502 27.1 (L) 34.5 - 44.0 % Final   07/10/2017 1419 34.7 34.5  - 44.0 % Final            Platlets No results found for: LABPLAT  Sodium  Sodium   Date/Time Value Ref Range Status   07/13/2017 0447 135 132 - 146 mmol/L Final   07/12/2017 0502 132 132 - 146 mmol/L Final   07/10/2017 1419 138 132 - 146 mmol/L Final     Potassium  Potassium   Date/Time Value Ref Range Status   07/13/2017 0447 3.8 3.5 - 5.5 mmol/L Final   07/12/2017 0502 4.4 3.5 - 5.5 mmol/L Final   07/10/2017 1419 4.8 3.5 - 5.5 mmol/L Final     Chloride  Chloride   Date/Time Value Ref Range Status   07/13/2017 0447 107 99 - 109 mmol/L Final   07/12/2017 0502 107 99 - 109 mmol/L Final   07/10/2017 1419 102 99 - 109 mmol/L Final     BicarbonateNo results found for: PLASMABICARB    BUN BUN   Date/Time Value Ref Range Status   07/13/2017 0447 26 (H) 9 - 23 mg/dL Final   07/12/2017 0502 30 (H) 9 - 23 mg/dL Final   07/10/2017 1419 35 (H) 9 - 23 mg/dL Final      Creatinine Creatinine   Date/Time Value Ref Range Status   07/13/2017 0447 1.50 (H) 0.60 - 1.30 mg/dL Final   07/12/2017 0502 1.60 (H) 0.60 - 1.30 mg/dL Final   07/10/2017 1419 1.90 (H) 0.60 - 1.30 mg/dL Final      Calcium Calcium   Date/Time Value Ref Range Status   07/13/2017 0447 9.0 8.7 - 10.4 mg/dL Final   07/12/2017 0502 8.1 (L) 8.7 - 10.4 mg/dL Final   07/10/2017 1419 9.6 8.7 - 10.4 mg/dL Final      Mag No results found for: MG        PT/INR:    Protime   Date Value Ref Range Status   07/10/2017 10.6 9.6 - 11.5 Seconds Final   /  INR   Date Value Ref Range Status   07/10/2017 0.97  Final     Troponin I   No results found for: CKTOTAL, CKMB, CKMBINDEX, TROPONINI, TROPONINT      amLODIPine 5 mg Oral Q24H   aspirin 81 mg Oral Daily   atenolol 50 mg Oral Daily   atorvastatin 40 mg Oral Nightly   clopidogrel 75 mg Oral Daily   docusate sodium 100 mg Oral BID   famotidine 20 mg Oral BID   folic acid 1 mg Oral Daily   furosemide 20 mg Intravenous Once   losartan 50 mg Oral BID   metoprolol tartrate      metoprolol tartrate      QUEtiapine 25 mg Oral Q8H    triamterene-hydrochlorothiazide 1 tablet Oral Daily   vancomycin 750 mg Intravenous Once       amiodarone 1 mg/min Last Rate: 1 mg/min (07/13/17 0843)   Followed by     amiodarone 0.5 mg/min    phenylephrine (SLIME-SYNEPHRINE) 50 mg/250 (0.2 mg/mL) infusion 0.5-3 mcg/kg/min    sodium chloride 30 mL/hr Last Rate: Stopped (07/12/17 1400)       Assessment/Plan     Patient Active Problem List   Diagnosis Code   • Essential hypertension I10   • Coronary artery disease involving native coronary artery of native heart without angina pectoris I25.10   • Palpitations R00.2   • Transient cerebral ischemia G45.9   • Carotid artery stenosis I65.29   • Carotid bruit R09.89   • Subclavian artery stenosis, left I77.1   • Subclavian steal syndrome G45.8   • Hyperlipidemia E78.5   • Hypertension I10   • Hypothyroidism E03.9   • GERD (gastroesophageal reflux disease) K21.9   • Tobacco abuse Z72.0   • COPD (chronic obstructive pulmonary disease) J44.9   • H/O CVA (cerebral vascular accident) I63.9   • Bilateral Carotid Artery Stenosis I65.29     Amiodarone for a-fib  Cont beta blocker    LINDA Woo  07/13/17  9:29 AM

## 2017-07-14 LAB
ANION GAP SERPL CALCULATED.3IONS-SCNC: 5 MMOL/L (ref 3–11)
BASOPHILS # BLD AUTO: 0.01 10*3/MM3 (ref 0–0.2)
BASOPHILS NFR BLD AUTO: 0.1 % (ref 0–1)
BUN BLD-MCNC: 26 MG/DL (ref 9–23)
BUN/CREAT SERPL: 18.6 (ref 7–25)
CALCIUM SPEC-SCNC: 9.2 MG/DL (ref 8.7–10.4)
CHLORIDE SERPL-SCNC: 104 MMOL/L (ref 99–109)
CO2 SERPL-SCNC: 27 MMOL/L (ref 20–31)
CREAT BLD-MCNC: 1.4 MG/DL (ref 0.6–1.3)
DEPRECATED RDW RBC AUTO: 52 FL (ref 37–54)
EOSINOPHIL # BLD AUTO: 0.06 10*3/MM3 (ref 0–0.3)
EOSINOPHIL NFR BLD AUTO: 0.7 % (ref 0–3)
ERYTHROCYTE [DISTWIDTH] IN BLOOD BY AUTOMATED COUNT: 15.8 % (ref 11.3–14.5)
GFR SERPL CREATININE-BSD FRML MDRD: 36 ML/MIN/1.73
GLUCOSE BLD-MCNC: 98 MG/DL (ref 70–100)
HCT VFR BLD AUTO: 26.7 % (ref 34.5–44)
HGB BLD-MCNC: 8.8 G/DL (ref 11.5–15.5)
IMM GRANULOCYTES # BLD: 0.03 10*3/MM3 (ref 0–0.03)
IMM GRANULOCYTES NFR BLD: 0.4 % (ref 0–0.6)
LYMPHOCYTES # BLD AUTO: 0.91 10*3/MM3 (ref 0.6–4.8)
LYMPHOCYTES NFR BLD AUTO: 11.1 % (ref 24–44)
MAGNESIUM SERPL-MCNC: 2 MG/DL (ref 1.3–2.7)
MCH RBC QN AUTO: 30 PG (ref 27–31)
MCHC RBC AUTO-ENTMCNC: 33 G/DL (ref 32–36)
MCV RBC AUTO: 91.1 FL (ref 80–99)
MONOCYTES # BLD AUTO: 0.82 10*3/MM3 (ref 0–1)
MONOCYTES NFR BLD AUTO: 10 % (ref 0–12)
NEUTROPHILS # BLD AUTO: 6.36 10*3/MM3 (ref 1.5–8.3)
NEUTROPHILS NFR BLD AUTO: 77.7 % (ref 41–71)
PLATELET # BLD AUTO: 179 10*3/MM3 (ref 150–450)
PMV BLD AUTO: 10.6 FL (ref 6–12)
POTASSIUM BLD-SCNC: 3.6 MMOL/L (ref 3.5–5.5)
RBC # BLD AUTO: 2.93 10*6/MM3 (ref 3.89–5.14)
SODIUM BLD-SCNC: 136 MMOL/L (ref 132–146)
WBC NRBC COR # BLD: 8.19 10*3/MM3 (ref 3.5–10.8)

## 2017-07-14 PROCEDURE — 80048 BASIC METABOLIC PNL TOTAL CA: CPT | Performed by: INTERNAL MEDICINE

## 2017-07-14 PROCEDURE — 85025 COMPLETE CBC W/AUTO DIFF WBC: CPT | Performed by: INTERNAL MEDICINE

## 2017-07-14 PROCEDURE — 25010000002 AMIODARONE IN DEXTROSE 5% 360-4.14 MG/200ML-% SOLUTION: Performed by: THORACIC SURGERY (CARDIOTHORACIC VASCULAR SURGERY)

## 2017-07-14 PROCEDURE — 92526 ORAL FUNCTION THERAPY: CPT

## 2017-07-14 PROCEDURE — 97110 THERAPEUTIC EXERCISES: CPT

## 2017-07-14 PROCEDURE — 93005 ELECTROCARDIOGRAM TRACING: CPT | Performed by: INTERNAL MEDICINE

## 2017-07-14 PROCEDURE — 99024 POSTOP FOLLOW-UP VISIT: CPT | Performed by: THORACIC SURGERY (CARDIOTHORACIC VASCULAR SURGERY)

## 2017-07-14 PROCEDURE — 92507 TX SP LANG VOICE COMM INDIV: CPT

## 2017-07-14 PROCEDURE — 99233 SBSQ HOSP IP/OBS HIGH 50: CPT | Performed by: INTERNAL MEDICINE

## 2017-07-14 PROCEDURE — 83735 ASSAY OF MAGNESIUM: CPT | Performed by: INTERNAL MEDICINE

## 2017-07-14 PROCEDURE — 25010000002 HYDRALAZINE PER 20 MG: Performed by: NURSE PRACTITIONER

## 2017-07-14 PROCEDURE — 99232 SBSQ HOSP IP/OBS MODERATE 35: CPT | Performed by: PSYCHIATRY & NEUROLOGY

## 2017-07-14 RX ORDER — AMIODARONE HYDROCHLORIDE 200 MG/1
200 TABLET ORAL EVERY 12 HOURS SCHEDULED
Status: DISCONTINUED | OUTPATIENT
Start: 2017-07-14 | End: 2017-07-15 | Stop reason: HOSPADM

## 2017-07-14 RX ORDER — HYDRALAZINE HYDROCHLORIDE 20 MG/ML
20 INJECTION INTRAMUSCULAR; INTRAVENOUS EVERY 6 HOURS PRN
Status: DISCONTINUED | OUTPATIENT
Start: 2017-07-14 | End: 2017-07-15 | Stop reason: HOSPADM

## 2017-07-14 RX ORDER — AMLODIPINE BESYLATE 10 MG/1
10 TABLET ORAL
Status: DISCONTINUED | OUTPATIENT
Start: 2017-07-15 | End: 2017-07-15 | Stop reason: HOSPADM

## 2017-07-14 RX ADMIN — ATORVASTATIN CALCIUM 40 MG: 40 TABLET, FILM COATED ORAL at 20:30

## 2017-07-14 RX ADMIN — FAMOTIDINE 20 MG: 20 TABLET ORAL at 08:03

## 2017-07-14 RX ADMIN — AMLODIPINE BESYLATE 5 MG: 5 TABLET ORAL at 08:03

## 2017-07-14 RX ADMIN — AMIODARONE HYDROCHLORIDE 0.5 MG/MIN: 1.8 INJECTION, SOLUTION INTRAVENOUS at 02:05

## 2017-07-14 RX ADMIN — AMIODARONE HYDROCHLORIDE 200 MG: 200 TABLET ORAL at 20:30

## 2017-07-14 RX ADMIN — DOCUSATE SODIUM 100 MG: 100 CAPSULE, LIQUID FILLED ORAL at 18:12

## 2017-07-14 RX ADMIN — LOSARTAN POTASSIUM 50 MG: 50 TABLET, FILM COATED ORAL at 20:30

## 2017-07-14 RX ADMIN — LOSARTAN POTASSIUM 50 MG: 50 TABLET, FILM COATED ORAL at 08:03

## 2017-07-14 RX ADMIN — FOLIC ACID 1 MG: 1 TABLET ORAL at 08:03

## 2017-07-14 RX ADMIN — AMIODARONE HYDROCHLORIDE 200 MG: 200 TABLET ORAL at 11:49

## 2017-07-14 RX ADMIN — DOCUSATE SODIUM 100 MG: 100 CAPSULE, LIQUID FILLED ORAL at 08:04

## 2017-07-14 RX ADMIN — FAMOTIDINE 20 MG: 20 TABLET ORAL at 18:15

## 2017-07-14 RX ADMIN — ASPIRIN 81 MG: 81 TABLET, COATED ORAL at 08:04

## 2017-07-14 RX ADMIN — ATENOLOL 50 MG: 50 TABLET ORAL at 08:03

## 2017-07-14 RX ADMIN — CLOPIDOGREL BISULFATE 75 MG: 75 TABLET ORAL at 08:04

## 2017-07-14 RX ADMIN — ACETAMINOPHEN 325 MG: 325 TABLET, FILM COATED ORAL at 11:48

## 2017-07-14 RX ADMIN — HYDRALAZINE HYDROCHLORIDE 20 MG: 20 INJECTION INTRAMUSCULAR; INTRAVENOUS at 22:28

## 2017-07-14 RX ADMIN — TRIAMTERENE AND HYDROCHLOROTHIAZIDE 1 TABLET: 37.5; 25 TABLET ORAL at 08:05

## 2017-07-14 NOTE — PLAN OF CARE
Problem: Patient Care Overview (Adult)  Goal: Plan of Care Review  Outcome: Ongoing (interventions implemented as appropriate)    07/14/17 1513   Coping/Psychosocial Response Interventions   Plan Of Care Reviewed With patient   Patient Care Overview   Progress improving   Outcome Evaluation   Outcome Summary/Follow up Plan Pt increased ambulation distance to 500ft with RW and CGA. Pt requires constant VC's to keep RW close; ocassionally needs assistance to steer. Pt limited by increased fatigue.          Problem: Inpatient Physical Therapy  Goal: Bed Mobility Goal LTG- PT  Outcome: Ongoing (interventions implemented as appropriate)    07/12/17 0911 07/14/17 1513   Bed Mobility PT LTG   Bed Mobility PT LTG, Date Established 07/12/17 --    Bed Mobility PT LTG, Time to Achieve 2 wks --    Bed Mobility PT LTG, Activity Type supine to sit/sit to supine --    Bed Mobility PT LTG, [place] Level independent --    Bed Mobility PT LTG, Outcome --  goal ongoing       Goal: Transfer Training Goal 1 LTG- PT  Outcome: Ongoing (interventions implemented as appropriate)    07/12/17 0911 07/14/17 1513   Transfer Training PT LTG   Transfer Training PT LTG, Date Established 07/12/17 --    Transfer Training PT LTG, Time to Achieve 2 wks --    Transfer Training PT LTG, Activity Type sit to stand/stand to sit --    Transfer Training PT LTG, [place] Level conditional independence --    Transfer Training PT LTG, Assist Device walker, rolling --    Transfer Training PT LTG, Outcome --  goal ongoing       Goal: Gait Training Goal LTG- PT  Outcome: Ongoing (interventions implemented as appropriate)    07/12/17 0911 07/14/17 1513   Gait Training PT LTG   Gait Training Goal PT LTG, Date Established 07/12/17 --    Gait Training Goal PT LTG, Time to Achieve 2 wks --    Gait Training Goal PT LTG, [place] Level supervision required --    Gait Training Goal PT LTG, Assist Device walker, rolling --    Gait Training Goal PT LTG,  Distance to Achieve 300 --    Gait Training Goal PT LTG, Outcome --  goal partially met  (goal met for distance with CGA)       Goal: Stair Training Goal LTG- PT  Outcome: Ongoing (interventions implemented as appropriate)    07/12/17 0911 07/14/17 1513   Stair Training PT LTG   Stair Training Goal PT LTG, Date Established 07/12/17 --    Stair Training Goal PT LTG, Time to Achieve 2 wks --    Stair Training Goal PT LTG, Number of Steps 1 --    Stair Training Goal PT LTG, [place] Level contact guard assist --    Stair Training Goal PT LTG, Outcome --  goal ongoing

## 2017-07-14 NOTE — PROGRESS NOTES
CTS Progress Note       LOS: 3 days   Patient Care Team:  HEBERT Montanez as PCP - General (Family Medicine)  LINDA Smith as Physician Assistant (Cardiology)  Tyler Marie MD as Consulting Physician (Cardiology)        Vital Signs  Temp:  [97.8 °F (36.6 °C)-99.2 °F (37.3 °C)] 98.5 °F (36.9 °C)  Heart Rate:  [54-73] 61  Resp:  [16-21] 16  BP: (121-175)/(50-72) 172/66    Physical Exam: Neck is satisfactory and tongue is midline.  Confusion seems to have resolved       Results     Results from last 7 days  Lab Units 07/14/17  0434   WBC 10*3/mm3 8.19   HEMOGLOBIN g/dL 8.8*   HEMATOCRIT % 26.7*   PLATELETS 10*3/mm3 179       Results from last 7 days  Lab Units 07/14/17  0434   SODIUM mmol/L 136   POTASSIUM mmol/L 3.6   CHLORIDE mmol/L 104   CO2 mmol/L 27.0   BUN mg/dL 26*   CREATININE mg/dL 1.40*   GLUCOSE mg/dL 98   CALCIUM mg/dL 9.2           Imaging Results (last 24 hours)     ** No results found for the last 24 hours. **          Assessment    Principal Problem:    Subclavian steal syndrome  Active Problems:    Hyperlipidemia    Hypertension    Hypothyroidism    GERD (gastroesophageal reflux disease)    Tobacco abuse    COPD (chronic obstructive pulmonary disease)    H/O CVA (cerebral vascular accident)    Bilateral Carotid Artery Stenosis    Patient seems to be improving with resolution of confusion.  She has also been in a sinus rhythm while on intravenous amiodarone.  Would not require anticoagulation if she maintains in sinus rhythm.  Probable transition to oral amiodarone today with tentative plans for discharge home tomorrow if cardiology agrees.    Plan   Anticipate discharge home tomorrow with or without oral amiodarone per cardiology.  No narcotics on discharge    Please note that portions of this note were completed with a voice recognition program. Efforts were made to edit the dictations, but occasionally words are mistranscribed.    Tyler Carranza MD  07/14/17  8:06 AM

## 2017-07-14 NOTE — THERAPY TREATMENT NOTE
Acute Care - Physical Therapy Treatment Note  [facility]     Patient Name: Kelly Uriostegui  : 1935  MRN: [id number]  Today's Date: 2017  Onset of Illness/Injury or Date of Surgery Date: 17  Date of Referral to PT: 17  Referring Physician: MD Phillip    Admit Date: 2017    Visit Dx:    ICD-10-CM ICD-9-CM   1. Impaired functional mobility, balance, gait, and endurance Z74.09 V49.89   2. Subclavian steal syndrome G45.8 435.2   3. Subclavian artery stenosis, left I77.1 447.1   4. Impaired mobility and ADLs Z74.09 799.89   5. Cognitive communication deficit R41.841 799.52   6. Pharyngeal dysphagia R13.13 787.23     Patient Active Problem List   Diagnosis   • Essential hypertension   • Coronary artery disease involving native coronary artery of native heart without angina pectoris   • Palpitations   • Transient cerebral ischemia   • Carotid artery stenosis   • Carotid bruit   • Subclavian artery stenosis, left   • Subclavian steal syndrome   • Hyperlipidemia   • Hypertension   • Hypothyroidism   • GERD (gastroesophageal reflux disease)   • Tobacco abuse   • COPD (chronic obstructive pulmonary disease)   • H/O CVA (cerebral vascular accident)   • Bilateral Carotid Artery Stenosis               Adult Rehabilitation Note       17 1401 17 1458 17 1439    Rehab Assessment/Intervention    Discipline (P)  physical therapist  -KR physical therapist  -SJ occupational therapist  -JR    Document Type (P)  therapy note (daily note)  -KR  therapy note (daily note)  -JR    Subjective Information (P)  agree to therapy;no complaints  -KR  agree to therapy;complains of;fatigue  -JR    Patient Effort, Rehab Treatment (P)  good  -KR      Symptoms Noted During/After Treatment (P)  fatigue  -KR  none  -JR    Precautions/Limitations (P)  fall precautions  -KR fall precautions  -SJ fall precautions  -JR    Precautions/Limitations, Vision   vision impairment, bilaterally   worse in L eye per  family  -JR    Recorded by [KR] Melissa Bledsoe, PT Student [SJ] Tyra Frankel, PT [JR] Luann Forde, OT    Vital Signs    Pre Systolic BP Rehab (P)  142  -  -  -JR    Pre Treatment Diastolic BP (P)  52  -KR 78  -SJ 78  -JR    Post Systolic BP Rehab (P)  146  -  -  -JR    Post Treatment Diastolic BP (P)  58  -KR 78  -SJ 78  -JR    Pretreatment Heart Rate (beats/min) (P)  58  -KR 62  -SJ 62  -JR    Posttreatment Heart Rate (beats/min) (P)  59  -KR 62  -SJ 63  -JR    Pre SpO2 (%) (P)  96  -KR 98  -SJ 99  -JR    O2 Delivery Pre Treatment (P)  supplemental O2  -KR room air  -SJ room air  -JR    Post SpO2 (%) (P)  97  -KR 99  -  -JR    O2 Delivery Post Treatment (P)  supplemental O2  -KR room air  -SJ room air  -JR    Pre Patient Position (P)  Supine  -KR Supine  -SJ Supine  -JR    Intra Patient Position (P)  Standing  -KR Standing  -SJ Standing  -JR    Post Patient Position (P)  Sitting  -KR Supine  -SJ Supine  -JR    Recorded by [KR] Melissa Bledsoe, PT Student [SJ] Tyra Frankel, PT [JR] Luann Forde, OT    Pain Assessment    Pain Assessment (P)  0-10  -KR No/denies pain  -SJ 0-10  -JR    Pain Score (P)  0  -KR 0  -SJ 0  -JR    Post Pain Score (P)  0  -KR 0  -SJ 0  -JR    Recorded by [KR] Melissa Bledsoe, PT Student [SJ] Tyra Frankel, PT [JR] Luann Forde, OT    Cognitive Assessment/Intervention    Current Cognitive/Communication Assessment (P)  functional  -KR functional   increased lethargy today, required cues to stay awake  -SJ functional   Pt very lethargic this date  -JR    Orientation Status (P)  oriented x 4  -KR oriented x 4  -SJ oriented x 4  -JR    Follows Commands/Answers Questions (P)  able to follow single-step instructions;100% of the time  -KR 75% of the time;able to follow single-step instructions;needs cueing;needs increased time;needs repetition  -SJ 75% of the time;able to follow single-step instructions;needs cueing;needs increased time;needs  repetition  -JR    Personal Safety (P)  mild impairment;decreased awareness, need for assist;decreased awareness, need for safety;decreased insight to deficits  -KR mild impairment;decreased awareness, need for assist;decreased awareness, need for safety;decreased insight to deficits  -SJ mild impairment;decreased awareness, need for assist;decreased awareness, need for safety;decreased insight to deficits  -JR    Personal Safety Interventions (P)  fall prevention program maintained;gait belt;nonskid shoes/slippers when out of bed  -KR fall prevention program maintained;gait belt;muscle strengthening facilitated;nonskid shoes/slippers when out of bed  -SJ     Recorded by [KR] Melissa Bledsoe, PT Student [SJ] Tyra Frankel, PT [JR] Luann ALAS Maurisio, OT    Bed Mobility, Assessment/Treatment    Bed Mobility, Assistive Device  head of bed elevated  -SJ head of bed elevated  -JR    Bed Mob, Supine to Sit, [place] (P)  contact guard assist;verbal cues required  -KR moderate assist (50% patient effort);2 person assist required  -SJ moderate assist (50% patient effort);2 person assist required  -JR    Bed Mob, Sit to Supine, [place] (P)  not tested  -KR moderate assist (50% patient effort);2 person assist required  -SJ moderate assist (50% patient effort);2 person assist required  -JR    Bed Mobility, Safety Issues (P)  decreased use of legs for bridging/pushing;decreased use of arms for pushing/pulling  -KR decreased use of arms for pushing/pulling;decreased use of legs for bridging/pushing  -SJ decreased use of arms for pushing/pulling;decreased use of legs for bridging/pushing  -JR    Bed Mobility, Impairments (P)  strength decreased  -KR strength decreased;impaired balance;postural control impaired  -SJ strength decreased;impaired balance;postural control impaired  -JR    Bed Mobility, Comment (P)  VC's for sequencing  -KR      Recorded by [KR] Melissa Bledsoe, PT Student [SJ] Tyra Frankel, PT [JR] Luann  R Maurisio, OT    Transfer Assessment/Treatment    Transfers, Sit-Stand [place] (P)  contact guard assist;verbal cues required  -KR minimum assist (75% patient effort);2 person assist required  -SJ minimum assist (75% patient effort);2 person assist required  -JR    Transfers, Stand-Sit [place] (P)  contact guard assist;verbal cues required  -KR minimum assist (75% patient effort);2 person assist required  -SJ minimum assist (75% patient effort);2 person assist required  -JR    Transfers, Sit-Stand-Sit, Assist Device (P)  rolling walker  -KR rolling walker  -SJ rolling walker  -JR    Transfer, Safety Issues (P)  balance decreased during turns;step length decreased  -KR balance decreased during turns;step length decreased  -SJ balance decreased during turns;step length decreased  -JR    Transfer, Impairments (P)  impaired balance;strength decreased  -KR strength decreased;impaired balance  -SJ strength decreased;impaired balance  -JR    Transfer, Comment (P)  VC's for hand placement.  -KR cues for hand placement and safe t/f technique  -SJ Verbal cues for hand placement and safe transfer techniques  -JR    Recorded by [KR] Melissa Bledsoe PT Student [SJ] Tyra Frankel PT [JR] Luann ALAS Maurisio, OT    Gait Assessment/Treatment    Gait, [place] Level (P)  contact guard assist;verbal cues required  -KR minimum assist (75% patient effort);2 person assist required;verbal cues required  -SJ     Gait, Assistive Device (P)  rolling walker  -KR rolling walker  -SJ     Gait, Distance (Feet) (P)  500  -  -SJ     Gait, Gait Pattern Analysis (P)  swing-through gait  -KR swing-through gait  -SJ     Gait, Gait Deviations (P)  iza decreased;forward flexed posture;narrow base;step length decreased  -KR iza decreased;decreased heel strike;double stance time increased;forward flexed posture;narrow base  -SJ     Gait, Safety Issues (P)  step length decreased   pushes RW too far in front  -KR balance decreased  during turns;step length decreased;weight-shifting ability decreased  -SJ     Gait, Impairments (P)  impaired balance;strength decreased  -KR strength decreased;impaired balance  -SJ     Gait, Comment (P)  Pt given VC's to keep RW close; has some difficulty steering RW  -KR slow gait speed, requires physical assist with RW at times and cues to decrease lean to R  -SJ     Recorded by [KR] Melissa Bledsoe, PT Student [SJ] Tyra Frankel, PT     Functional Mobility    Functional Mobility- Ind. Level   minimum assist (75% patient effort);2 person assist required  -JR    Functional Mobility- Device   rolling walker  -JR    Functional Mobility-Distance (Feet)   --   in hallway  -JR    Functional Mobility- Safety Issues   balance decreased during turns;step length decreased  -JR    Functional Mobility- Comment   Pt required assist to guide walker. Pt frequently letting go of walker, required verbal cues to keep hands on walker.  -JR    Recorded by   [JR] Luann Forde, OT    Motor Skills/Interventions    Additional Documentation (P)  Balance Skills Training (Group)  -KR      Recorded by [KR] Melissa Bledsoe, PT Student      Balance Skills Training    Sitting-Level of Assistance (P)  Close supervision  -KR Minimum assistance  -SJ Minimum assistance  -JR    Sitting-Balance Support (P)  Right upper extremity supported;Left upper extremity supported;Feet supported  -KR Feet unsupported  -SJ Feet unsupported  -JR    Standing-Level of Assistance (P)  Contact guard  -KR Minimum assistance  -SJ Minimum assistance  -JR    Static Standing Balance Support (P)  assistive device  -KR assistive device  - assistive device  -JR    Gait Balance-Level of Assistance  Minimum assistance;x2  -SJ Minimum assistance;x2  -JR    Gait Balance Support  assistive device  - assistive device  -JR    Recorded by [KR] Melissa Bledsoe, PT Student [SJ] Tyra Frankel, PT [JR] Luann Forde, OT    Therapy Exercises    Bilateral Lower Extremities  (P)  AROM:;sitting;ankle pumps/circles;hip flexion;LAQ;10 reps  -KR      Recorded by [KR] Melissa Bledsoe, PT Student      Positioning and Restraints    Pre-Treatment Position (P)  in bed  -KR in bed  -SJ in bed  -JR    Post Treatment Position (P)  chair  -KR bed  -SJ bed  -JR    In Bed  notified nsg;supine;call light within reach;encouraged to call for assist;exit alarm on;with family/caregiver  -SJ notified nsg;supine;call light within reach;encouraged to call for assist;exit alarm on;with family/caregiver  -JR    In Chair (P)  notified nsg;sitting;call light within reach;encouraged to call for assist  -KR      Recorded by [RACHID] Melissa Bledsoe, PT Student [SJ] Tyra Frankel, PT [JR] Luann Forde, OT      User Key  (r) = Recorded By, (t) = Taken By, (c) = Cosigned By    Initials Name Effective Dates    SJ Tyra Frankel, PT 06/19/15 -     JR Luann Forde, OT 06/22/15 -     KR Melissa Bledsoe, PT Student 05/30/17 -                 IP PT Goals       07/14/17 1513 07/13/17 1531 07/12/17 0911    Bed Mobility PT LTG    Bed Mobility PT LTG, Date Established   07/12/17  -LS    Bed Mobility PT LTG, Time to Achieve   2 wks  -LS    Bed Mobility PT LTG, Activity Type   supine to sit/sit to supine  -LS    Bed Mobility PT LTG, [place] Level   independent  -LS    Bed Mobility PT LTG, Outcome (P)  goal ongoing  -KR goal ongoing  -SJ     Transfer Training PT LTG    Transfer Training PT LTG, Date Established   07/12/17  -LS    Transfer Training PT LTG, Time to Achieve   2 wks  -LS    Transfer Training PT LTG, Activity Type   sit to stand/stand to sit  -LS    Transfer Training PT LTG, [place] Level   conditional independence  -LS    Transfer Training PT LTG, Assist Device   walker, rolling  -LS    Transfer Training PT LTG, Outcome (P)  goal ongoing  -KR goal ongoing  -SJ     Gait Training PT LTG    Gait Training Goal PT LTG, Date Established   07/12/17  -LS    Gait Training Goal PT LTG, Time to Achieve   2 wks  -LS     Gait Training Goal PT LTG, [place] Level   supervision required  -LS    Gait Training Goal PT LTG, Assist Device   walker, rolling  -LS    Gait Training Goal PT LTG, Distance to Achieve   300  -LS    Gait Training Goal PT LTG, Outcome (P)  goal partially met   goal met for distance with CGA  -KR goal ongoing  -SJ     Stair Training PT LTG    Stair Training Goal PT LTG, Date Established   07/12/17  -LS    Stair Training Goal PT LTG, Time to Achieve   2 wks  -LS    Stair Training Goal PT LTG, Number of Steps   1  -LS    Stair Training Goal PT LTG, [place] Level   contact guard assist  -LS    Stair Training Goal PT LTG, Outcome (P)  goal ongoing  -KR goal ongoing  -SJ       User Key  (r) = Recorded By, (t) = Taken By, (c) = Cosigned By    Initials Name Provider Type    MITZY Frankel, PT Physical Therapist    KWESI Peña, PT Physical Therapist    RACHID Bledsoe, PT Student PT Student          Physical Therapy Education     Title: PT OT SLP Therapies (Done)     Topic: Physical Therapy (Done)     Point: Mobility training (Done)    Learning Progress Summary    Learner Readiness Method Response Comment Documented by Status   Patient Acceptance E VU  KR 07/14/17 1513 Done    Eager E VU  MT 07/14/17 1225 Done    Acceptance E,D NR   07/13/17 1533 Active    Acceptance E,D NR   07/12/17 0910 Active   Family Eager E VU  MT 07/14/17 1225 Done    Acceptance E,D NR   07/12/17 0910 Active               Point: Home exercise program (Done)    Learning Progress Summary    Learner Readiness Method Response Comment Documented by Status   Patient Acceptance E VU  KR 07/14/17 1513 Done    Eager E VU  MT 07/14/17 1225 Done   Family Eager E VU  MT 07/14/17 1225 Done               Point: Body mechanics (Done)    Learning Progress Summary    Learner Readiness Method Response Comment Documented by Status   Patient Acceptance E VU  KR 07/14/17 1513 Done    Eager E VU  MT 07/14/17 1225 Done    Acceptance E,D NR    07/13/17 1533 Active    Acceptance E,D NR  LS 07/12/17 0910 Active   Family Eager E VU  MT 07/14/17 1225 Done    Acceptance E,D NR  LS 07/12/17 0910 Active               Point: Precautions (Done)    Learning Progress Summary    Learner Readiness Method Response Comment Documented by Status   Patient Acceptance E VU  KR 07/14/17 1513 Done    Eager E VU  MT 07/14/17 1225 Done    Acceptance E,D NR   07/13/17 1533 Active    Acceptance E,D NR  LS 07/12/17 0910 Active   Family Eager E VU  MT 07/14/17 1225 Done    Acceptance E,D NR  LS 07/12/17 0910 Active                      User Key     Initials Effective Dates Name Provider Type Discipline     06/19/15 -  Tyra Frankel, PT Physical Therapist PT     06/19/15 -  Cecilia Peña, PT Physical Therapist PT    MT 06/16/16 -  Tena Salas RN Registered Nurse Nurse     05/30/17 -  Melissa Bledsoe, PT Student PT Student PT                    PT Recommendation and Plan  Anticipated Discharge Disposition: inpatient rehabilitation facility (pending functional progress and social support availability)  PT Frequency: daily  Plan of Care Review  Plan Of Care Reviewed With: (P) patient  Progress: (P) improving  Outcome Summary/Follow up Plan: (P) Pt increased ambulation distance to 500ft with RW and CGA. Pt requires constant VC's to keep RW close; ocassionally needs assistance to steer. Pt limited by increased fatigue.           Outcome Measures       07/14/17 1401 07/13/17 1458 07/13/17 1439    How much help from another person do you currently need...    Turning from your back to your side while in flat bed without using bedrails? (P)  3  -KR 2  -SJ     Moving from lying on back to sitting on the side of a flat bed without bedrails? (P)  3  -KR 2  -SJ     Moving to and from a bed to a chair (including a wheelchair)? (P)  3  -KR 3  -SJ     Standing up from a chair using your arms (e.g., wheelchair, bedside chair)? (P)  3  -KR 3  -SJ     Climbing 3-5 steps with a railing?  (P)  2  -KR 2  -SJ     To walk in hospital room? (P)  3  -KR 3  -SJ     AM-PAC 6 Clicks Score (P)  17  -KR 15  -SJ     How much help from another is currently needed...    Putting on and taking off regular lower body clothing?   2  -JR    Bathing (including washing, rinsing, and drying)   2  -JR    Toileting (which includes using toilet bed pan or urinal)   2  -JR    Putting on and taking off regular upper body clothing   3  -JR    Taking care of personal grooming (such as brushing teeth)   3  -JR    Eating meals   3  -JR    Score   15  -JR    Modified [place] Scale    Modified [place] Scale (P)  3 - Moderate disability.  Requiring some help, but able to walk without assistance.  -KR 3 - Moderate disability.  Requiring some help, but able to walk without assistance.  -SJ 3 - Moderate disability.  Requiring some help, but able to walk without assistance.  -JR    Functional Assessment    Outcome Measure Options (P)  AM-PAC 6 Clicks Basic Mobility (PT);Modified [place]  -KR AM-PAC 6 Clicks Daily Activity (OT);Modified [place]  -SJ AM-PAC 6 Clicks Daily Activity (OT);Modified [place]  -JR      07/12/17 0808 07/12/17 0807       How much help from another person do you currently need...    Turning from your back to your side while in flat bed without using bedrails? 3  -LS      Moving from lying on back to sitting on the side of a flat bed without bedrails? 3  -LS      Moving to and from a bed to a chair (including a wheelchair)? 3  -LS      Standing up from a chair using your arms (e.g., wheelchair, bedside chair)? 3  -LS      Climbing 3-5 steps with a railing? 2  -LS      To walk in hospital room? 3  -LS      AM-PAC 6 Clicks Score 17  -LS      How much help from another is currently needed...    Putting on and taking off regular lower body clothing?  3  -JR     Bathing (including washing, rinsing, and drying)  3  -JR     Toileting (which includes using toilet bed pan or urinal)  3  -JR     Putting on and taking off regular  upper body clothing  3  -JR     Taking care of personal grooming (such as brushing teeth)  3  -JR     Eating meals  3  -JR     Score  18  -JR     Modified [place] Scale    Modified [place] Scale 3 - Moderate disability.  Requiring some help, but able to walk without assistance.  -LS 3 - Moderate disability.  Requiring some help, but able to walk without assistance.  -JR     Functional Assessment    Outcome Measure Options AM-PAC 6 Clicks Basic Mobility (PT);Modified [place]  -LS AM-PAC 6 Clicks Daily Activity (OT);Modified [place]  -JR       User Key  (r) = Recorded By, (t) = Taken By, (c) = Cosigned By    Initials Name Provider Type    MITZY Frankel, PT Physical Therapist    JR Luann Forde, OT Occupational Therapist    KWESI Peña, PT Physical Therapist    RACHID Bledsoe, PT Student PT Student           Time Calculation:         PT Charges       07/14/17 1517          Time Calculation    Start Time (P)  1401  -KR      PT Received On (P)  07/14/17  -KR      PT Goal Re-Cert Due Date (P)  07/22/17  -KR      Time Calculation- PT    Total Timed Code Minutes- PT (P)  23 minute(s)  -KR        User Key  (r) = Recorded By, (t) = Taken By, (c) = Cosigned By    Initials Name Provider Type    RACHID Bledsoe, PT Student PT Student          Therapy Charges for Today     Code Description Service Date Service Provider Modifiers Qty    [id number] HC PT THER PROC EA 15 MIN 7/14/2017 Melissa Bledsoe, PT Student GP 2          PT G-Codes  Outcome Measure Options: (P) AM-PAC 6 Clicks Basic Mobility (PT), Marquez Bledsoe PT Student  7/14/2017

## 2017-07-14 NOTE — THERAPY TREATMENT NOTE
Acute Care - Speech Language Pathology Treatment Note  [facility]     Patient Name: Kelly Uriostegui  : 1935  MRN: [id number]  Today's Date: 2017         Admit Date: 2017    Visit Dx:      ICD-10-CM ICD-9-CM   1. Impaired functional mobility, balance, gait, and endurance Z74.09 V49.89   2. Subclavian steal syndrome G45.8 435.2   3. Subclavian artery stenosis, left I77.1 447.1   4. Impaired mobility and ADLs Z74.09 799.89   5. Cognitive communication deficit R41.841 799.52   6. Pharyngeal dysphagia R13.13 787.23     Patient Active Problem List   Diagnosis   • Essential hypertension   • Coronary artery disease involving native coronary artery of native heart without angina pectoris   • Palpitations   • Transient cerebral ischemia   • Carotid artery stenosis   • Carotid bruit   • Subclavian artery stenosis, left   • Subclavian steal syndrome   • Hyperlipidemia   • Hypertension   • Hypothyroidism   • GERD (gastroesophageal reflux disease)   • Tobacco abuse   • COPD (chronic obstructive pulmonary disease)   • H/O CVA (cerebral vascular accident)   • Bilateral Carotid Artery Stenosis              Adult Rehabilitation Note       17 1615 17 1401 17 1458    Rehab Assessment/Intervention    Discipline speech language pathologist  -AS physical therapist  -RACHID TRAVIS JB2 physical therapist  -MITZY    Document Type therapy note (daily note)  -AS therapy note (daily note)  -RACHID TRAVIS JB2     Subjective Information no complaints;agree to therapy  -AS agree to therapy;no complaints  -RACHID TRAVIS JB2     Patient Effort, Rehab Treatment good  -AS good  -RACHID TRAVIS JB2     Symptoms Noted During/After Treatment  fatigue  -RACHID TRAVIS JB2     Precautions/Limitations  fall precautions  -RACHID TRAVIS JB2 fall precautions  -SJ    Recorded by [AS] Jessica Paredes, MS CCC-SLP [RACHID TRAVIS,JB2] Maryanne Gamboa, PT (r) Melissa Bledsoe PT Student (t) Maryanne Gamboa, PT (c) [SJ] Tyra Frankel PT    Vital Signs    Pre Systolic BP Rehab   142  -ANGY,KR,JB2 181  -SJ    Pre Treatment Diastolic BP  52  -ANGY,KR,JB2 78  -SJ    Post Systolic BP Rehab  146  -ANGY,KR,JB2 186  -SJ    Post Treatment Diastolic BP  58  -ANGY,KR,JB2 78  -SJ    Pretreatment Heart Rate (beats/min)  58  -ANGY,KR,JB2 62  -SJ    Posttreatment Heart Rate (beats/min)  59  -ANGY,KR,JB2 62  -SJ    Pre SpO2 (%)  96  -ANGY,KR,JB2 98  -SJ    O2 Delivery Pre Treatment  supplemental O2  -ANGY,KR,JB2 room air  -SJ    Post SpO2 (%)  97  -NAGY,KR,JB2 99  -SJ    O2 Delivery Post Treatment  supplemental O2  -ANGY,KR,JB2 room air  -SJ    Pre Patient Position  Supine  -ANGY,KR,JB2 Supine  -SJ    Intra Patient Position  Standing  -ANGY,KR,JB2 Standing  -SJ    Post Patient Position  Sitting  -ANGY,KR,JB2 Supine  -SJ    Recorded by  [ANGY,KR,JB2] Maryanne Gamboa, PT (r) Melissa Bledsoe, PT Student (t) Maryanne Gamboa, PT (c) [SJ] Tyra Frankel, PT    Pain Assessment    Pain Assessment No/denies pain  -AS 0-10  -ANGY,KR,JB2 No/denies pain  -SJ    Pain Score  0  -ANGY,KR,JB2 0  -SJ    Post Pain Score  0  -ANGY,KR,JB2 0  -SJ    Recorded by [AS] Jessica Paredes MS CCC-SLP [ANGY,KR,JB2] Maryanne Gamboa PT (r) Melissa Bledsoe, PT Student (t) Maryanne Gamboa, PT (c) [SJ] Tyra Frankel, PT    Cognitive Assessment/Intervention    Current Cognitive/Communication Assessment  functional  -ANGY,KR,JB2 functional   increased lethargy today, required cues to stay awake  -SJ    Orientation Status  oriented x 4  -ANGY,KR,JB2 oriented x 4  -SJ    Follows Commands/Answers Questions  able to follow single-step instructions;100% of the time  -ANGY,KR,JB2 75% of the time;able to follow single-step instructions;needs cueing;needs increased time;needs repetition  -SJ    Personal Safety  mild impairment;decreased awareness, need for assist;decreased awareness, need for safety;decreased insight to deficits  -ANGY,KR,JB2 mild impairment;decreased awareness, need for assist;decreased awareness, need for safety;decreased insight to deficits  -MITZY    Personal  Safety Interventions  fall prevention program maintained;gait belt;nonskid shoes/slippers when out of bed  -ANGY,KR,JB2 fall prevention program maintained;gait belt;muscle strengthening facilitated;nonskid shoes/slippers when out of bed  -SJ    Recorded by  [ANGY,KR,JB2] Maryanne Gamboa, PT (r) Melissa Bledsoe PT Student (t) Maryanne Gamboa, PT (c) [SJ] Tyra Frankel PT    Improve word retrieval skills    Improve word retrieval skills by: completing functional word finding tasks;answer WH question with one word;80%;with inconsistent cues  -AS      Status: Improve word retrieval skills Progressing as expected  -AS      Word Retrieval Skills Progress 80%;with inconsistent cues;continue to address  -AS      Recorded by [AS] Jessica Paredes MS CCC-SLP      Improve connected speech to express thoughts    Improve connected speech to express thoughts by: describing a picture;giving basic definition of a word;80%;with inconsistent cues  -AS      Status: Improve connected speech to express thoughts Progressing as expected  -AS      Connected Speech Progress 80%;with inconsistent cues;continue to address  -AS      Recorded by [AS] Jessica Paredes MS CCC-SLP      Improve memory skills    Improve memory skills through: recalling unrelated word lists immediately;recall details of the day;listen to a paragraph and answer questions;80%;with inconsistent cues  -AS      Status: Improve memory skills Progressing as expected  -AS      Memory Skills Progress 70%;with inconsistent cues;continue to address  -AS      Recorded by [AS] Jessica Paredes MS CCC-SLP      Dysphagia Other 1    Dysphagia Other 1 Objective Tolerate diet without s/s aspiration with 100% accuracy and no cues for aspiration precautions  -AS      Status: Dysphagia Other 1 Progressing as expected  -AS      Dysphagia Other 1 Progress continue to address  -AS      Comments: Dysphagia Other 1 Trialed thin via straw and mary cracker. No overt clinical s/sxs  aspiration noted. Pt/RN denied s/sxs aspiration w/ meals or medications today. Spoke to pt/dtr about aspiration precautions and possible upgrade to regular foods as strength improves.  -AS      Recorded by [AS] Jessica Paredes MS CCC-SLP      Bed Mobility, Assessment/Treatment    Bed Mobility, Assistive Device   head of bed elevated  -SJ    Bed Mob, Supine to Sit, [place]  contact guard assist;verbal cues required  -ANGY,KR,JB2 moderate assist (50% patient effort);2 person assist required  -SJ    Bed Mob, Sit to Supine, [place]  not tested  -ANGY,KR,JB2 moderate assist (50% patient effort);2 person assist required  -SJ    Bed Mobility, Safety Issues  decreased use of legs for bridging/pushing;decreased use of arms for pushing/pulling  -ANGY,KR,JB2 decreased use of arms for pushing/pulling;decreased use of legs for bridging/pushing  -SJ    Bed Mobility, Impairments  strength decreased  -ANGY,KR,JB2 strength decreased;impaired balance;postural control impaired  -SJ    Bed Mobility, Comment  VC's for sequencing  -ANGY,KR,JB2     Recorded by  [ANGY,KR,JB2] Maryanne Gamboa, PT (r) Melissa Bledsoe PT Student (t) Maryanne Gamboa, PT (c) [SJ] Tyra Frankel PT    Transfer Assessment/Treatment    Transfers, Sit-Stand [place]  contact guard assist;verbal cues required  -ANGY,KR,JB2 minimum assist (75% patient effort);2 person assist required  -SJ    Transfers, Stand-Sit [place]  contact guard assist;verbal cues required  -ANGY,KR,JB2 minimum assist (75% patient effort);2 person assist required  -SJ    Transfers, Sit-Stand-Sit, Assist Device  rolling walker  -ANGY,KR,JB2 rolling walker  -SJ    Transfer, Safety Issues  balance decreased during turns;step length decreased  -ANGY,KR,JB2 balance decreased during turns;step length decreased  -SJ    Transfer, Impairments  impaired balance;strength decreased  -ANGY,KR,JB2 strength decreased;impaired balance  -SJ    Transfer, Comment  VC's for hand placement.  -ANGY,KR,JB2 cues  for hand placement and safe t/f technique  -SJ    Recorded by  [ANGY,KR,JB2] Maryanne Gamboa, PT (r) Melissa Bledsoe PT Student (t) Maryanne Gamboa, PT (c) [SJ] Tyra Frankel, PT    Gait Assessment/Treatment    Gait, [place] Level  contact guard assist;verbal cues required  -ANGY,KR,JB2 minimum assist (75% patient effort);2 person assist required;verbal cues required  -SJ    Gait, Assistive Device  rolling walker  -ANGY,KR,JB2 rolling walker  -SJ    Gait, Distance (Feet)  500  -ANGY,KR,JB2 180  -SJ    Gait, Gait Pattern Analysis  swing-through gait  -ANGY,RACHID,JB2 swing-through gait  -SJ    Gait, Gait Deviations  iza decreased;forward flexed posture;narrow base;step length decreased  -ANGY,KR,JB2 iza decreased;decreased heel strike;double stance time increased;forward flexed posture;narrow base  -SJ    Gait, Safety Issues  step length decreased   pushes RW too far in front  -ANGY,RACHID,JB2 balance decreased during turns;step length decreased;weight-shifting ability decreased  -SJ    Gait, Impairments  impaired balance;strength decreased  -ANGY,KR,JB2 strength decreased;impaired balance  -SJ    Gait, Comment  Pt given VC's to keep RW close; has some difficulty steering RW  -RACHID TRAVIS,JB2 slow gait speed, requires physical assist with RW at times and cues to decrease lean to R  -SJ    Recorded by  [ANGY,KR,JB2] Maryanne Gamboa, PT (r) Melissa Bledsoe PT Student (t) Maryanne Gamboa, PT (c) [SJ] Tyra Frankel PT    Motor Skills/Interventions    Additional Documentation  Balance Skills Training (Group)  -ANGY,RACHID,JB2     Recorded by  [ANGY,KR,JB2] Maryanne Gamboa PT (r) Melissa Bledsoe PT Student (t) Maryanne Gamboa, PT (c)     Balance Skills Training    Sitting-Level of Assistance  Close supervision  -ANGY,KR,JB2 Minimum assistance  -SJ    Sitting-Balance Support  Right upper extremity supported;Left upper extremity supported;Feet supported  -ANGY,KR,JB2 Feet unsupported  -SJ    Standing-Level of Assistance  Contact guard   -ANGY,KR,JB2 Minimum assistance  -SJ    Static Standing Balance Support  assistive device  -ANGY,KR,JB2 assistive device  -SJ    Gait Balance-Level of Assistance   Minimum assistance;x2  -SJ    Gait Balance Support   assistive device  -SJ    Recorded by  [ANGY,KR,JB2] Maryanne Gamboa, PT (r) Melissa Bledsoe, PT Student (t) Maryanne Gamboa, PT (c) [SJ] Tyra Frankel, PT    Therapy Exercises    Bilateral Lower Extremities  AROM:;sitting;ankle pumps/circles;hip flexion;LAQ;10 reps  -ANGY,KR,JB2     Recorded by  [ANGY,KR,JB2] Maryanne Gamboa PT (r) Melissa Bledsoe, PT Student (t) Maryanne Gamboa, PT (c)     Positioning and Restraints    Pre-Treatment Position  in bed  -ANGY,KR,JB2 in bed  -SJ    Post Treatment Position  chair  -ANGY,KR,JB2 bed  -SJ    In Bed   notified nsg;supine;call light within reach;encouraged to call for assist;exit alarm on;with family/caregiver  -SJ    In Chair  notified nsg;sitting;call light within reach;encouraged to call for assist  -ANGY,KR,JB2     Recorded by  [ANGY,KR,JB2] Maryanne Gamboa PT (r) Melissa Bldesoe, PT Student (t) Maryanne Gamboa, PT (c) [SJ] Tyra Frankel, PT      07/13/17 8479          Rehab Assessment/Intervention    Discipline occupational therapist  -JR      Document Type therapy note (daily note)  -JR      Subjective Information agree to therapy;complains of;fatigue  -JR      Symptoms Noted During/After Treatment none  -JR      Precautions/Limitations fall precautions  -JR      Precautions/Limitations, Vision vision impairment, bilaterally   worse in L eye per family  -JR      Recorded by [JR] Luann Forde OT      Vital Signs    Pre Systolic BP Rehab 181  -JR      Pre Treatment Diastolic BP 78  -JR      Post Systolic BP Rehab 186  -JR      Post Treatment Diastolic BP 78  -JR      Pretreatment Heart Rate (beats/min) 62  -JR      Posttreatment Heart Rate (beats/min) 63  -JR      Pre SpO2 (%) 99  -JR      O2 Delivery Pre Treatment room air  -JR      Post SpO2 (%) 100  -JR       O2 Delivery Post Treatment room air  -JR      Pre Patient Position Supine  -JR      Intra Patient Position Standing  -JR      Post Patient Position Supine  -JR      Recorded by [JR] Luann Forde OT      Pain Assessment    Pain Assessment 0-10  -JR      Pain Score 0  -JR      Post Pain Score 0  -JR      Recorded by [JR] Luann Forde, OT      Cognitive Assessment/Intervention    Current Cognitive/Communication Assessment functional   Pt very lethargic this date  -JR      Orientation Status oriented x 4  -JR      Follows Commands/Answers Questions 75% of the time;able to follow single-step instructions;needs cueing;needs increased time;needs repetition  -JR      Personal Safety mild impairment;decreased awareness, need for assist;decreased awareness, need for safety;decreased insight to deficits  -JR      Recorded by [JR] Luann Forde OT      Bed Mobility, Assessment/Treatment    Bed Mobility, Assistive Device head of bed elevated  -JR      Bed Mob, Supine to Sit, [place] moderate assist (50% patient effort);2 person assist required  -JR      Bed Mob, Sit to Supine, [place] moderate assist (50% patient effort);2 person assist required  -JR      Bed Mobility, Safety Issues decreased use of arms for pushing/pulling;decreased use of legs for bridging/pushing  -JR      Bed Mobility, Impairments strength decreased;impaired balance;postural control impaired  -JR      Recorded by [JR] Luann Forde OT      Transfer Assessment/Treatment    Transfers, Sit-Stand [place] minimum assist (75% patient effort);2 person assist required  -JR      Transfers, Stand-Sit [place] minimum assist (75% patient effort);2 person assist required  -JR      Transfers, Sit-Stand-Sit, Assist Device rolling walker  -JR      Transfer, Safety Issues balance decreased during turns;step length decreased  -JR      Transfer, Impairments strength decreased;impaired balance  -JR      Transfer, Comment Verbal cues for  hand placement and safe transfer techniques  -JR      Recorded by [JR] Luann Forde, OT      Functional Mobility    Functional Mobility- Ind. Level minimum assist (75% patient effort);2 person assist required  -JR      Functional Mobility- Device rolling walker  -JR      Functional Mobility-Distance (Feet) --   in hallway  -JR      Functional Mobility- Safety Issues balance decreased during turns;step length decreased  -JR      Functional Mobility- Comment Pt required assist to guide walker. Pt frequently letting go of walker, required verbal cues to keep hands on walker.  -JR      Recorded by [JR] Luann Forde, OT      Balance Skills Training    Sitting-Level of Assistance Minimum assistance  -JR      Sitting-Balance Support Feet unsupported  -JR      Standing-Level of Assistance Minimum assistance  -JR      Static Standing Balance Support assistive device  -JR      Gait Balance-Level of Assistance Minimum assistance;x2  -JR      Gait Balance Support assistive device  -JR      Recorded by [JR] Luann Forde OT      Positioning and Restraints    Pre-Treatment Position in bed  -JR      Post Treatment Position bed  -JR      In Bed notified nsg;supine;call light within reach;encouraged to call for assist;exit alarm on;with family/caregiver  -JR      Recorded by [JR] Luann Forde, OT        User Key  (r) = Recorded By, (t) = Taken By, (c) = Cosigned By    Initials Name Effective Dates    ANGY Maryanne Gamboa, PT 06/19/15 -     MITZY Frankel, PT 06/19/15 -     JR Luann Forde, OT 06/22/15 -     AS Jessica Paredes, MS [facility]-SLP 06/22/15 -     RACHID Bledsoe, PT Student 05/30/17 -               IP SLP Goals       07/14/17 1708 07/13/17 1557 07/12/17 1150    Safely Consume Diet    Safely Consume Diet- SLP, Date Established  07/13/17  -SM     Safely Consume Diet- SLP, Time to Achieve  by discharge  -SM     Safely Consume Diet- SLP, Date Goal Reviewed 07/14/17  -AS      Safely Consume Diet- SLP,  Outcome goal ongoing  -AS goal ongoing  -SM     Expressive- Optimal Participation in Care    Expressive Optimal Participation in Care- SLP, Date Established   07/12/17  -AS    Expressive Optimal Participation in Care- SLP, Time to Achieve   by discharge  -AS    Expressive Optimal Participation in Care- SLP, Date Goal Reviewed 07/14/17  -AS      Expressive Optimal Participation in Care- SLP, Outcome goal ongoing  -AS      Cognitive Linguistic- Optimal Participation in Care    Cognitive Linguistic Optimal Participation in Care- SLP, Date Established   07/12/17  -AS    Cognitive Linguistic Optimal Participation in Care- SLP, Time to Achieve   by discharge  -AS    Cognitive Linguistic Optimal Participation in Care- SLP, Date Goal Reviewed 07/14/17  -AS      Cognitive Linguistic Optimal Participation in Care- SLP, Outcome goal ongoing  -AS        User Key  (r) = Recorded By, (t) = Taken By, (c) = Cosigned By    Initials Name Provider Type    JORGE Nj, MS CCC-SLP Speech and Language Pathologist    AS Jessica Paredes, MS CCC-SLP Speech and Language Pathologist          EDUCATION  The patient has been educated in the following areas:   Cognitive Impairment Communication Impairment Dysphagia (Swallowing Impairment) Modified Diet Instruction.    SLP Recommendation and Plan  Plan of Care Review  Plan Of Care Reviewed With: patient, daughter  Progress: progress toward functional goals as expected  Outcome Summary/Follow up Plan: Pt participated in dysphagia and cog/comm tx this date. Trialed thin via straw and mary cracker. No overt clinical s/sxs aspiration noted. Pt/RN denied s/sxs aspiration w/ meals or medications today. Spoke to pt/dtr about aspiration precautions and possible upgrade to regular foods as strength improves. They stated understanding. Pt's short-term memory and word-finding skills appeared improved this date. Pt/dtr agreed that her cog-linguistic skills are close to baseline. Provided info  re: OP tx should deficits persist once pt is d/c'd home (possible tomorrow per chart). SLP will f/u to ensure no further issues before d/c, if possible.      Time Calculation:         Time Calculation- SLP       07/14/17 1711          Time Calculation- SLP    SLP Start Time 1615  -AS      SLP Received On 07/14/17  -AS        User Key  (r) = Recorded By, (t) = Taken By, (c) = Cosigned By    Initials Name Provider Type    AS Jessica Paredes MS CCC-SLP Speech and Language Pathologist          Therapy Charges for Today     Code Description Service Date Service Provider Modifiers Qty    [id number] HC ST TREATMENT SWALLOW 1 7/14/2017 MS ARTI Brooks GN 1    [id number]  ST TREATMENT SPEECH 1 7/14/2017 MS ATRI Brooks GN 1               MS ARTI Pryor  7/14/2017

## 2017-07-14 NOTE — PROGRESS NOTES
[facility]       LOS: 3 days   Patient Care Team:  HEBERT Montanez as PCP - General (Family Medicine)  LINDA Smith as Physician Assistant (Cardiology)  Tyler Marie MD as Consulting Physician (Cardiology)        Subjective       Patient Denies:  Cp, sob, palps.  Up in chair.  Feel good      Vital Signs  Temp:  [97.8 °F (36.6 °C)-99.2 °F (37.3 °C)] 98.5 °F (36.9 °C)  Heart Rate:  [54-68] 61  Resp:  [16-21] 16  BP: (110-175)/(46-72) 110/46    Intake/Output Summary (Last 24 hours) at 07/14/17 0857  Last data filed at 07/14/17 0600   Gross per 24 hour   Intake              657 ml   Output              475 ml   Net              182 ml          Physical Exam:     General Appearance:   A&O, in no acute distress       Neck:   No adenopathy, supple, trachea midline, no JVD       Lungs:     Clear to auscultation anteriorly,respirations regular, even and                  unlabored    Heart:    Regular rhythm and normal rate, normal S1 and S2, +            murmur, no gallop, no rub, no click   Chest Wall:    No abnormalities observed   Abdomen:     Normal bowel sounds, no masses, no organomegaly, soft              Extremities:   no edema, no cyanosis, no redness   Pulses:   Pulses palpable and equal bilaterally     Results Review:     I reviewed the patient's new clinical results.      WBC WBC   Date/Time Value Ref Range Status   07/14/2017 0434 8.19 3.50 - 10.80 10*3/mm3 Final   07/13/2017 0447 9.89 3.50 - 10.80 10*3/mm3 Final   07/12/2017 0502 13.30 (H) 3.50 - 10.80 10*3/mm3 Final            HGB Hemoglobin   Date/Time Value Ref Range Status   07/14/2017 0434 8.8 (L) 11.5 - 15.5 g/dL Final   07/13/2017 0447 8.9 (L) 11.5 - 15.5 g/dL Final   07/12/2017 0502 8.9 (L) 11.5 - 15.5 g/dL Final           HCT Hematocrit   Date/Time Value Ref Range Status   07/14/2017 0434 26.7 (L) 34.5 - 44.0 % Final   07/13/2017 0447 26.7 (L) 34.5 - 44.0 % Final    07/12/2017 0502 27.1 (L) 34.5 - 44.0 % Final            Platlets No results found for: LABPLAT  Sodium  Sodium   Date/Time Value Ref Range Status   07/14/2017 0434 136 132 - 146 mmol/L Final   07/13/2017 0447 135 132 - 146 mmol/L Final   07/12/2017 0502 132 132 - 146 mmol/L Final     Potassium  Potassium   Date/Time Value Ref Range Status   07/14/2017 0434 3.6 3.5 - 5.5 mmol/L Final   07/13/2017 0447 3.8 3.5 - 5.5 mmol/L Final   07/12/2017 0502 4.4 3.5 - 5.5 mmol/L Final     Chloride  Chloride   Date/Time Value Ref Range Status   07/14/2017 0434 104 99 - 109 mmol/L Final   07/13/2017 0447 107 99 - 109 mmol/L Final   07/12/2017 0502 107 99 - 109 mmol/L Final     BicarbonateNo results found for: PLASMABICARB    BUN BUN   Date/Time Value Ref Range Status   07/14/2017 0434 26 (H) 9 - 23 mg/dL Final   07/13/2017 0447 26 (H) 9 - 23 mg/dL Final   07/12/2017 0502 30 (H) 9 - 23 mg/dL Final      Creatinine Creatinine   Date/Time Value Ref Range Status   07/14/2017 0434 1.40 (H) 0.60 - 1.30 mg/dL Final   07/13/2017 0447 1.50 (H) 0.60 - 1.30 mg/dL Final   07/12/2017 0502 1.60 (H) 0.60 - 1.30 mg/dL Final      Calcium Calcium   Date/Time Value Ref Range Status   07/14/2017 0434 9.2 8.7 - 10.4 mg/dL Final   07/13/2017 0447 9.0 8.7 - 10.4 mg/dL Final   07/12/2017 0502 8.1 (L) 8.7 - 10.4 mg/dL Final      Mag Magnesium   Date/Time Value Ref Range Status   07/14/2017 0434 2.0 1.3 - 2.7 mg/dL Final           PT/INR:    No results found for: PROTIME/  No results found for: INR  Troponin I   No results found for: CKTOTAL, CKMB, CKMBINDEX, TROPONINI, TROPONINT      amLODIPine 5 mg Oral Q24H   aspirin 81 mg Oral Daily   atenolol 50 mg Oral Daily   atorvastatin 40 mg Oral Nightly   clopidogrel 75 mg Oral Daily   docusate sodium 100 mg Oral BID   famotidine 20 mg Oral BID   folic acid 1 mg Oral Daily   losartan 50 mg Oral BID   QUEtiapine 50 mg Oral Nightly   triamterene-hydrochlorothiazide 1 tablet Oral Daily   vancomycin 750 mg  Intravenous Once       amiodarone 0.5 mg/min Last Rate: 0.5 mg/min (07/14/17 0205)   phenylephrine (SLIME-SYNEPHRINE) 50 mg/250 (0.2 mg/mL) infusion 0.5-3 mcg/kg/min    sodium chloride 30 mL/hr Last Rate: Stopped (07/12/17 1400)       Assessment/Plan     Patient Active Problem List   Diagnosis Code   • Essential hypertension I10   • Coronary artery disease involving native coronary artery of native heart without angina pectoris I25.10   • Palpitations R00.2   • Transient cerebral ischemia G45.9   • Carotid artery stenosis I65.29   • Carotid bruit R09.89   • Subclavian artery stenosis, left I77.1   • Subclavian steal syndrome G45.8   • Hyperlipidemia E78.5   • Hypertension I10   • Hypothyroidism E03.9   • GERD (gastroesophageal reflux disease) K21.9   • Tobacco abuse Z72.0   • COPD (chronic obstructive pulmonary disease) J44.9   • H/O CVA (cerebral vascular accident) I63.9   • Bilateral Carotid Artery Stenosis I65.29     CV stable  Amiodarone for a-fib  Cont beta blocker    LINDA Woo  07/14/17  8:57 AM

## 2017-07-14 NOTE — PROGRESS NOTES
INTENSIVIST   PROGRESS NOTE     Hospital:  LOS: 3 days     Ms. Kelly Uriostegui, 82 y.o. female is followed for:      Subclavian steal syndrome    Hypertension    As an Intensivist, we provide an integrated approach to the ICU patient and family, medical management of comorbid conditions, lead interdisciplinary rounds and coordinate the care with all other services, including those from other specialists.     Subjective   S     Mrs. Uriostegui is an 81 y/o WF who was admitted today for an elective left internal carotid endarterectomy and left common carotid to left subclavian artery bypass by Dr. Carranza today.  She is a smoker of 1/2 PPD. She states she is trying to quit smoking.  She is not a diabetic.     Interval History:  POD: 3 Days Post-Op  Much better today.  Good night of sleep.  Cooperative.  Not confused.        ROS:   No fevers.  No chest pain.  No dyspnea.  No nausea, vomiting or diarrhea.    Objective   O     Vitals:    Temp  Min: 97.8 °F (36.6 °C)  Max: 99.2 °F (37.3 °C)  BP  Min: 110/46  Max: 174/67  Pulse  Min: 54  Max: 68  Resp  Min: 16  Max: 20  SpO2  Min: 93 %  Max: 99 % Flow (L/min)  Min: 2  Max: 2    Medications (drips):    amiodarone Last Rate: 0.5 mg/min (07/14/17 0205)   phenylephrine (SLIME-SYNEPHRINE) 50 mg/250 (0.2 mg/mL) infusion    sodium chloride Last Rate: Stopped (07/12/17 1400)     Physical Examination  Telemetry:  Sinus Rhythm: sinus bradycardia   Constitutional:  No acute distress.   HEENT: Normocephalic and atraumatic.    Left neck dressing.   Cardiovascular: RRR. Normal heart sounds.   Respiratory: No respiratory distress. Normal respiratory effort.  Normal BS.   Abdominal:  Soft. No masses. Non-tender. No distension. No HSM.   Extremities: No digital cyanosis. No clubbing.   Neurological:   Alert and Oriented to person, place, and time.  Best Eye Response: 4-->(E4) spontaneous  Best Motor Response: 6-->(M6) obeys commands  Best Verbal Response: 5-->(V5) oriented  Bernardo Coma  Scale Score: 15   Left sided lip still weak.   Psychiatric:  Normal affect. Normal behavior.   Lines/Drains/Airways: PIV x2       Results from last 7 days  Lab Units 07/14/17  0434 07/13/17 0447 07/12/17  0502   WBC 10*3/mm3 8.19 9.89 13.30*   HEMOGLOBIN g/dL 8.8* 8.9* 8.9*   MCV fL 91.1 87.8 92.2   PLATELETS 10*3/mm3 179 204 201       Results from last 7 days  Lab Units 07/14/17  0434 07/13/17 0447 07/12/17  0502   SODIUM mmol/L 136 135 132   POTASSIUM mmol/L 3.6 3.8 4.4   CO2 mmol/L 27.0 22.0 23.0   CREATININE mg/dL 1.40* 1.50* 1.60*   MAGNESIUM mg/dL 2.0  --   --      Estimated Creatinine Clearance: 23.5 mL/min (by C-G formula based on Cr of 1.4).         CXR 7/10/17 Emphysema, no infiltrates    Results: Reviewed.  I reviewed the patient's new laboratory and imaging results.  I independently reviewed the patient's new images.    Medications: Reviewed.    Assessment/Plan   A / P     82 y.o.female, admitted on 7/11/2017 with Subclavian artery stenosis, left [I77.1]  Subclavian steal syndrome [G45.8]:     1. Left sided carotid artery stenois and left Subclavian Steal Syndrome (symptomatic: falling)  Procedure(s) (LRB):  LEFT CAROTID ENDARTERCTOMY WITH PATCH, LEFT CAROTID SUBCLAVIAN BYPASS (Left)   Dr. Tyler Carranza (Cardiothoracic Surgery)  7/11/2017  2. Bilateral Carotid Artery Stenosis  1. CTA Neck 3/2017 revealed 60% stenosis Rt ICA, 50% stenosis Lt ICA  3. H/O CVA  1. Parietal (unknown side) per Cardiology record  2. On Plavix  4. Probably TIA post op as per Neuro's note.  5. Hyperlipidemia  1. Started on Lipitor  6. HTN  1. Atenolol, Cozaar, Maxzide at home. Norvasc (started this admission)  7. COPD  1. Tobacco Abuse - Last cigarette Sat 7/9/2017  8. GERD  9. Anemia. Hb stable at 88 g/L  10. ABDIRAHMAN: Creatinine down to 1.4 on 7/14 from 1.6 on 7/12  11. Hypothyroidism  1. Not on replacement  12. No history of DM    Lab Value Date/Time   HGBA1C 5.60 07/10/2017 1419      Nutrition:   Diet Soft Texture; Ground;  Thin; Cardiac  Advance Directives: Full Code    Assessment / Plan:    1. ICU medical management post surgery.  2. Probably home tomorrow as per CT surgery, as long as she remains in NSR.  3. No anticoagulation as per CT surgery, if she remains in NSR.  4. Transition Amiodarone gtt to PO.    Yonatan Malave MD, FACP, FCCP, [facility]    Intensive Care Medicine and Pulmonary Medicine

## 2017-07-14 NOTE — PROGRESS NOTES
Neurology       Patient Care Team:  HEBERT Montanez as PCP - General (Family Medicine)  LINDA Smith as Physician Assistant (Cardiology)  Tyler Marie MD as Consulting Physician (Cardiology)    Chief complaint: Confusion    History:  The patient slept throughout the night and is unrestrained and on confused.      Past Medical History:   Diagnosis Date   • Anemia    • Carotid stenosis    • Coronary artery disease    • CVA (cerebral vascular accident)    • GERD (gastroesophageal reflux disease)    • Hyperlipidemia    • Hypertension    • Hypothyroidism    • Macular degeneration        Vital Signs   Vitals:    07/14/17 0622 07/14/17 0700 07/14/17 0800 07/14/17 0900   BP: 172/66 174/67 110/46 141/55   BP Location:       Patient Position:       Pulse: 61 59 61 59   Resp:       Temp:       TempSrc:       SpO2:  95%     Weight:       Height:           Physical Exam:   General: Pleasant alert oriented ×3              Neuro: Speech is normal.    Cranial nerves show resolution of the facial drawing on the left.    Motor testing shows equal strength and no pronator drift.    Muscle tone is normal    Results Review:  Reviewed    Results from last 7 days  Lab Units 07/14/17  0434   WBC 10*3/mm3 8.19   HEMOGLOBIN g/dL 8.8*   HEMATOCRIT % 26.7*   PLATELETS 10*3/mm3 179       Results from last 7 days  Lab Units 07/14/17  0434 07/13/17  0447 07/12/17  0502   SODIUM mmol/L 136 135 132   POTASSIUM mmol/L 3.6 3.8 4.4   CHLORIDE mmol/L 104 107 107   CO2 mmol/L 27.0 22.0 23.0   BUN mg/dL 26* 26* 30*   CREATININE mg/dL 1.40* 1.50* 1.60*   CALCIUM mg/dL 9.2 9.0 8.1*   GLUCOSE mg/dL 98 99 114*     Imaging Results (last 24 hours)     ** No results found for the last 24 hours. **          Assessment:  Transient left-sided weakness postop    50% stenosis right internal carotid    Paroxysmal atrial fibrillation    Plan:  Patient is to be discharged tomorrow.        Comment:  The question is whether she should be anticoagulated  with the onset of the atrial fibrillation.    The stroke morphology on her scan would suggest arteriosclerotic origin.  If in fact she is anticoagulated with the NOAC, the Plavix would need to be discontinued.    Will defer to the cardiologist and cardiovascular surgeons regarding this decision.    This was discussed at length with the patient and her family         I discussed the patients findings and my recommendations with patient, family and nursing staff    Manuel Lopez MD  07/14/17  11:32 AM

## 2017-07-14 NOTE — PROGRESS NOTES
Adult Nutrition  Assessment/PES    Patient Name:  Kelly Uriostegui  YOB: 1935  MRN: [id number]  Admit Date:  7/11/2017    Assessment Date:  7/14/2017        Reason for Assessment       07/14/17 1135    Reason for Assessment    Reason For Assessment/Visit multidisciplinary rounds    Time Spent (min) 20              Nutrition/Diet History       07/14/17 1135    Nutrition/Diet History    Reported/Observed By RN    Other pt doing welll; ate 100 % of breakfast, surgeon plans to dc home on Sat                    Nutrition Prescription Ordered       07/14/17 1136    Nutrition Prescription PO    Current PO Diet Soft Texture    Texture Whole foods    Fluid Consistency Thin    Supplement Boost Plus    Supplement Frequency 2 times a day    Common Modifiers Cardiac;Consistent Carbohydrate                Problem/Interventions:                    Nutrition Intervention       07/14/17 1137    Nutrition Intervention    RD/Tech Action Care plan reviewd;Follow Tx progress              Education/Evaluation       07/14/17 1137    Monitor/Evaluation    Monitor Per protocol;PO intake;Supplement intake;Weight        Comments:      Electronically signed by:  Marva Cash RD  07/14/17 11:37 AM

## 2017-07-14 NOTE — PLAN OF CARE
Problem: Patient Care Overview (Adult)  Goal: Plan of Care Review  Outcome: Ongoing (interventions implemented as appropriate)    07/14/17 1708   Coping/Psychosocial Response Interventions   Plan Of Care Reviewed With patient;daughter   Patient Care Overview   Progress progress toward functional goals as expected   Outcome Evaluation   Outcome Summary/Follow up Plan Pt participated in dysphagia and cog/comm tx this date. Trialed thin via straw and mary cracker. No overt clinical s/sxs aspiration noted. Pt/RN denied s/sxs aspiration w/ meals or medications today. Spoke to pt/dtr about aspiration precautions and possible upgrade to regular foods as strength improves. They stated understanding.      Pt's short-term memory and word-finding skills appeared improved this date. Pt/dtr agreed that her cog-linguistic skills are close to baseline. Provided info re: OP tx should deficits persist once pt is d/c'd home (possible tomorrow per chart). SLP will f/u to ensure no further issues before d/c, if possible.         Problem: Inpatient SLP  Goal: Expressive-Patient will improve expressive language skills to allow optimal participation in care  Outcome: Ongoing (interventions implemented as appropriate)  Goal: Cognitive-linguistic-Patient will improve Cognitive-linguistic skills to allow optimal participation in care  Outcome: Ongoing (interventions implemented as appropriate)    07/12/17 1150 07/14/17 1708   Cognitive Linguistic- Optimal Participation in Care   Cognitive Linguistic Optimal Participation in Care- SLP, Date Established 07/12/17 --    Cognitive Linguistic Optimal Participation in Care- SLP, Time to Achieve by discharge --    Cognitive Linguistic Optimal Participation in Care- SLP, Date Goal Reviewed --  07/14/17   Cognitive Linguistic Optimal Participation in Care- SLP, Outcome --  goal ongoing       Goal: Dysphagia- Patient will safely consume diet as per recommendation with no signs/symptoms of  aspiration  Outcome: Ongoing (interventions implemented as appropriate)    07/13/17 1557 07/14/17 1708   Safely Consume Diet   Safely Consume Diet- SLP, Date Established 07/13/17 --    Safely Consume Diet- SLP, Time to Achieve by discharge --    Safely Consume Diet- SLP, Date Goal Reviewed --  07/14/17   Safely Consume Diet- SLP, Outcome --  goal ongoing

## 2017-07-14 NOTE — PLAN OF CARE
Problem: Patient Care Overview (Adult)  Goal: Plan of Care Review  Outcome: Ongoing (interventions implemented as appropriate)  Goal: Adult Individualization and Mutuality  Outcome: Ongoing (interventions implemented as appropriate)  Goal: Discharge Needs Assessment  Outcome: Ongoing (interventions implemented as appropriate)    Problem: Perioperative Period (Adult)  Goal: Signs and Symptoms of Listed Potential Problems Will be Absent or Manageable (Perioperative Period)  Outcome: Ongoing (interventions implemented as appropriate)    Problem: Skin Integrity Impairment, Risk/Actual (Adult)  Goal: Identify Related Risk Factors and Signs and Symptoms  Outcome: Ongoing (interventions implemented as appropriate)  Goal: Skin Integrity/Wound Healing  Outcome: Ongoing (interventions implemented as appropriate)    Problem: Pressure Ulcer (Adult)  Goal: Signs and Symptoms of Listed Potential Problems Will be Absent or Manageable (Pressure Ulcer)  Outcome: Ongoing (interventions implemented as appropriate)    Problem: Pressure Ulcer Risk (Wilfrido Scale) (Adult,Obstetrics,Pediatric)  Goal: Identify Related Risk Factors and Signs and Symptoms  Outcome: Ongoing (interventions implemented as appropriate)  Goal: Skin Integrity  Outcome: Ongoing (interventions implemented as appropriate)    Problem: Stroke (Ischemic) (Adult)  Goal: Signs and Symptoms of Listed Potential Problems Will be Absent or Manageable (Stroke)  Outcome: Ongoing (interventions implemented as appropriate)

## 2017-07-14 NOTE — PLAN OF CARE
Problem: Patient Care Overview (Adult)  Goal: Plan of Care Review  Outcome: Ongoing (interventions implemented as appropriate)    Problem: Pressure Ulcer Risk (Wilfrido Scale) (Adult,Obstetrics,Pediatric)  Goal: Identify Related Risk Factors and Signs and Symptoms  Outcome: Ongoing (interventions implemented as appropriate)    07/14/17 0532   Pressure Ulcer Risk (Wilfrido Scale)   Related Risk Factors (Pressure Ulcer Risk (Wilfrido Scale)) hospitalization prolonged         Problem: Stroke (Ischemic) (Adult)  Goal: Signs and Symptoms of Listed Potential Problems Will be Absent or Manageable (Stroke)  Outcome: Ongoing (interventions implemented as appropriate)    07/14/17 0532   Stroke (Ischemic)   Problems Assessed (Stroke (Ischemic)/TIA) all   Problems Present (Stroke (Ischemic)/TIA) other (see comments)  (left facial drooping at rest and movement)

## 2017-07-15 VITALS
OXYGEN SATURATION: 96 % | DIASTOLIC BLOOD PRESSURE: 55 MMHG | WEIGHT: 106 LBS | HEART RATE: 59 BPM | RESPIRATION RATE: 16 BRPM | TEMPERATURE: 98.5 F | BODY MASS INDEX: 18.1 KG/M2 | SYSTOLIC BLOOD PRESSURE: 116 MMHG | HEIGHT: 64 IN

## 2017-07-15 PROCEDURE — 99024 POSTOP FOLLOW-UP VISIT: CPT | Performed by: THORACIC SURGERY (CARDIOTHORACIC VASCULAR SURGERY)

## 2017-07-15 PROCEDURE — 99024 POSTOP FOLLOW-UP VISIT: CPT | Performed by: PHYSICIAN ASSISTANT

## 2017-07-15 RX ORDER — ATORVASTATIN CALCIUM 40 MG/1
40 TABLET, FILM COATED ORAL NIGHTLY
Qty: 30 TABLET | Refills: 0 | Status: SHIPPED | OUTPATIENT
Start: 2017-07-15

## 2017-07-15 RX ORDER — AMLODIPINE BESYLATE 10 MG/1
10 TABLET ORAL
Qty: 30 TABLET | Refills: 0 | Status: SHIPPED | OUTPATIENT
Start: 2017-07-15 | End: 2017-08-14 | Stop reason: SDUPTHER

## 2017-07-15 RX ORDER — AMIODARONE HYDROCHLORIDE 200 MG/1
200 TABLET ORAL EVERY 12 HOURS SCHEDULED
Qty: 44 TABLET | Refills: 6 | Status: CANCELLED | OUTPATIENT
Start: 2017-07-15

## 2017-07-15 RX ORDER — AMLODIPINE BESYLATE 10 MG/1
10 TABLET ORAL
Qty: 30 TABLET | Refills: 11 | Status: CANCELLED | OUTPATIENT
Start: 2017-07-15 | End: 2018-07-15

## 2017-07-15 RX ADMIN — ATENOLOL 50 MG: 50 TABLET ORAL at 08:33

## 2017-07-15 RX ADMIN — AMIODARONE HYDROCHLORIDE 200 MG: 200 TABLET ORAL at 08:34

## 2017-07-15 RX ADMIN — ACETAMINOPHEN 650 MG: 325 TABLET, FILM COATED ORAL at 08:33

## 2017-07-15 RX ADMIN — ASPIRIN 81 MG: 81 TABLET, COATED ORAL at 08:34

## 2017-07-15 RX ADMIN — AMLODIPINE BESYLATE 10 MG: 10 TABLET ORAL at 08:33

## 2017-07-15 RX ADMIN — TRIAMTERENE AND HYDROCHLOROTHIAZIDE 1 TABLET: 37.5; 25 TABLET ORAL at 08:38

## 2017-07-15 RX ADMIN — FOLIC ACID 1 MG: 1 TABLET ORAL at 08:34

## 2017-07-15 RX ADMIN — QUETIAPINE FUMARATE 12.5 MG: 25 TABLET, FILM COATED ORAL at 00:33

## 2017-07-15 RX ADMIN — DOCUSATE SODIUM 100 MG: 100 CAPSULE, LIQUID FILLED ORAL at 08:34

## 2017-07-15 RX ADMIN — LOSARTAN POTASSIUM 50 MG: 50 TABLET, FILM COATED ORAL at 08:33

## 2017-07-15 RX ADMIN — FAMOTIDINE 20 MG: 20 TABLET ORAL at 08:34

## 2017-07-15 NOTE — DISCHARGE INSTR - LAB
Pt need to to schedule f/u appt with dr mclean in two weeks in the [facility]

## 2017-07-15 NOTE — PLAN OF CARE
Problem: Patient Care Overview (Adult)  Goal: Plan of Care Review  Outcome: Outcome(s) achieved Date Met:  07/15/17  Goal: Adult Individualization and Mutuality  Outcome: Outcome(s) achieved Date Met:  07/15/17  Goal: Discharge Needs Assessment  Outcome: Outcome(s) achieved Date Met:  07/15/17    Problem: Skin Integrity Impairment, Risk/Actual (Adult)  Goal: Identify Related Risk Factors and Signs and Symptoms  Outcome: Outcome(s) achieved Date Met:  07/15/17  Goal: Skin Integrity/Wound Healing  Outcome: Outcome(s) achieved Date Met:  07/15/17    Problem: Stroke (Ischemic) (Adult)  Goal: Signs and Symptoms of Listed Potential Problems Will be Absent or Manageable (Stroke)  Outcome: Outcome(s) achieved Date Met:  07/15/17

## 2017-07-15 NOTE — PLAN OF CARE
Problem: Skin Integrity Impairment, Risk/Actual (Adult)  Goal: Identify Related Risk Factors and Signs and Symptoms  Outcome: Ongoing (interventions implemented as appropriate)    Problem: Pressure Ulcer Risk (Wilfrido Scale) (Adult,Obstetrics,Pediatric)  Goal: Identify Related Risk Factors and Signs and Symptoms  Outcome: Ongoing (interventions implemented as appropriate)

## 2017-07-15 NOTE — PROGRESS NOTES
POD# 4 s/p Left CEA + Left Carotid/Subclavian Bypass    Subjective:  Up to chair without complaints vital signs stable hemodynamics normal incisions healing clean and dry no drainage no erythema left radial pulses strong equal to the right radial pulse area    Objective:    Vital Signs  Temp:  [97.7 °F (36.5 °C)-98.9 °F (37.2 °C)] 98.5 °F (36.9 °C)  Heart Rate:  [58-72] 61  Resp:  [16-22] 16  BP: (104-188)/(45-96) 154/76    Physical Exam:   General Appearance: alert, appears stated age and cooperative   Lungs: clear to auscultation    Heart: regular rhythm & normal rate, normal S1, S2,no murmur    Skin:warm and dry         Diagnosis:  Left carotid stenosis/occlusion left subclavian artery data supposed left carotid endarterectomy and left carotid/subclavian artery bypass      Plan   Discharge on current meds.  Appointment Dr. Carranza in 2 weeks    Jones Galvan MD  07/15/17  9:45 AM

## 2017-07-17 NOTE — THERAPY DISCHARGE NOTE
Acute Care - Occupational Therapy Discharge Summary  [facility]     Patient Name: Kelly Uriostegui  : 1935  MRN: [id number]    Today's Date: 2017  Onset of Illness/Injury or Date of Surgery Date: 17    Date of Referral to OT: 17  Referring Physician: MD Phillip      Admit Date: 2017        OT Recommendation and Plan    Visit Dx:    ICD-10-CM ICD-9-CM   1. Impaired functional mobility, balance, gait, and endurance Z74.09 V49.89   2. Subclavian steal syndrome G45.8 435.2   3. Subclavian artery stenosis, left I77.1 447.1   4. Impaired mobility and ADLs Z74.09 799.89   5. Cognitive communication deficit R41.841 799.52   6. Pharyngeal dysphagia R13.13 787.23                     OT Goals       17 1508 17 0908       Transfer Training OT LTG    Transfer Training OT LTG, Date Established  17  -JR     Transfer Training OT LTG, Time to Achieve  1 wk  -JR     Transfer Training OT LTG, Activity Type  sit to stand/stand to sit  -JR     Transfer Training OT LTG, [place] Level  supervision required  -JR     Transfer Training OT LTG, Assist Device  other (see comments)   with appropriate AD  -JR     Transfer Training OT LTG, Outcome goal ongoing  -JR      Coordination OT LTG    Coordination OT LTG, Date Established  17  -JR     Coordination OT LTG, Time to Achieve  1 wk  -JR     Coordination OT LTG, Activity Type  FM written ex program  -JR     Coordination OT LTG, [place] Level  independent  -JR     Coordination OT LTG, Outcome goal ongoing  -JR      Functional Mobility OT LTG    Functional Mobility Goal OT LTG, Date Established  17  -JR     Functional Mobility Goal OT LTG, Time to Achieve  1 wk  -JR     Functional Mobility Goal OT LTG, [place] Level  contact guard  -JR     Functional Mobility Goal OT LTG, Assist Device  appropriate AD;on oxygen  -JR     Functional Mobility Goal OT LTG, Outcome goal ongoing  -JR        User Key  (r) = Recorded By, (t) =  Taken By, (c) = Cosigned By    Initials Name Provider Type    JR Luann Forde, OT Occupational Therapist                  OT Discharge Summary  Anticipated Discharge Disposition: inpatient rehabilitation facility  Reason for Discharge: Discharge from facility  Outcomes Achieved: Refer to plan of care for updates on goals achieved  Discharge Destination: Home      Luann Forde OT  7/17/2017

## 2017-07-18 NOTE — DISCHARGE SUMMARY
CTS Discharge Summary    Patient Care Team:  HEBERT Montanez as PCP - General (Family Medicine)  LINDA Smith as Physician Assistant (Cardiology)  Tyler Marie MD as Consulting Physician (Cardiology)      Date of Admission: 7/11/2017  5:31 AM  Date of Discharge:  07/15/2017    Discharge Diagnosis  Past Medical History:   Diagnosis Date   • Anemia    • Carotid stenosis    • Coronary artery disease    • CVA (cerebral vascular accident)    • GERD (gastroesophageal reflux disease)    • Hyperlipidemia    • Hypertension    • Hypothyroidism    • Macular degeneration        Principal Problem:    Subclavian steal syndrome  Active Problems:    Hyperlipidemia    Hypertension    Hypothyroidism    GERD (gastroesophageal reflux disease)    Tobacco abuse    COPD (chronic obstructive pulmonary disease)    H/O CVA (cerebral vascular accident)    Bilateral Carotid Artery Stenosis      History of Present IllnessThis patient was evaluated by Dr. Carranza on 5/22/2017 for both left sided carotid artery stenosis and left subclavian artery occlusion. This patient has had some episodes of dizziness and she has fallen on a number of occasions; says she always tends to lean to the right side. CT angiogram had demonstrated 60% left internal carotid artery stenosis but occlusion of the left subclavian artery and subclavian steal syndrome on the left. The patient denies significant left arm claudication and the patient is right-handed. It should be noted the blood pressure differential is 60 mmHg S in the left arm when compared to the right. The patient's daught states that she has been falling, consistent with potential subclavian steal syndrome and dizziness. No sx of a true stroke        Hospital Course  Patient is a 82 y.o. female admitted with subclavian steal.  During workup she was found to have a left carotid artery stenosis as well as a left subclavian artery stenosis.  It was felt that the patient should undergo  combined left sub-clavian bypass along with a carotid endarterectomy.  Patient was taken the operating suite on 07/11/2017 and underwent a left carotid endarterectomy with pericardial patch and EEG monitoring along with a left common carotid to left subclavian bypass with a 6 mm ringed PTFE graft.  patient tolerated procedure well was closed taken to the recovery room then to the intensive care unit.  Postoperative day #1 the patient is still on Cardene entered hypertension she is given some Lasix for some gentle diuresis.  Her neck drain is removed.  She had a little bit of left arm weakness during the night and some slightly slurred speech code 19 was called was patient was seen by Dr. Manuel Lopez of neurology who felt the patient had no acute event at this time.  However with a possible transient ischemic attack.  Patient has some atrial fibrillation the following day started on amiodarone IV then switched to by mouth her morphine and all IV narcotics were removed.  A little more Lasix for some gentle diuresis.  The patient was seen by Dr. Fili Fitzpatrick secondary to the atrial fibrillation patient was given some Haldol for sleep along with Seroquel at bedtime.  For the next 24 hours the patient remained hemodynamically stable no longer required the Seroquel or Haldol.  And the patient was able to be discharged home on 07/15/2015.    Procedures Performed  Procedure(s):  LEFT CAROTID ENDARTERCTOMY WITH PATCH, LEFT CAROTID SUBCLAVIAN BYPASS       Consults:   Consults     Date and Time Order Name Status Description    7/11/2017 1616 Inpatient Consult to Neurology Completed             Discharge Medications   Kelly Uriostegui   Home Medication Instructions CAROL:[id number]    Printed on:07/18/17 1643   Medication Information                      amLODIPine (NORVASC) 10 MG tablet  Take 1 tablet by mouth Daily.             aspirin 81 MG EC tablet  Take 1 tablet by mouth daily.             atenolol (TENORMIN) 50 MG  tablet  Take 1 tablet by mouth Daily.             atorvastatin (LIPITOR) 40 MG tablet  Take 1 tablet by mouth Every Night.             Cholecalciferol (VITAMIN D3) 2000 UNITS tablet  Take 1 tablet by mouth daily.             docusate sodium (COLACE) 100 MG capsule  Take 100 mg by mouth 2 (two) times a day.             folic acid (FOLVITE) 1 MG tablet  Take 1 mg by mouth daily.             loratadine (CLARITIN) 10 MG tablet  Take 10 mg by mouth Daily.             losartan (COZAAR) 50 MG tablet  Take 1 tablet by mouth 2 (Two) Times a Day.             Multiple Vitamins-Minerals (ICAPS) capsule  Take 1 capsule by mouth 2 (two) times a day.             raNITIdine (ZANTAC) 150 MG tablet  Take 150 mg by mouth 2 (Two) Times a Day.             rivaroxaban (XARELTO) 15 MG tablet  Take 1 tablet by mouth Daily With Dinner.             triamterene-hydrochlorothiazide (MAXZIDE-25) 37.5-25 MG per tablet  Take 1 tablet by mouth daily.                 Discharge Diet:  cardiac no concentrated sweets    Activity at Discharge: As tolerated.  Activity Instructions     Watch closely for bleeding, bruising . Let md know if any of these occur               Follow-up Appointments  Future Appointments  Date Time Provider Department Center   8/11/2017 8:45 AM LINDA Smith CD [facility] None   8/16/2017 10:00 AM LINDA Jaramillo CTS [facility] None     Discharge summary took less than 30 minutes to compile.     LINDA Ann  07/18/17  4:43 PM

## 2017-07-21 ENCOUNTER — TELEPHONE (OUTPATIENT)
Dept: CARDIAC SURGERY | Facility: CLINIC | Age: 82
End: 2017-07-21

## 2017-07-21 NOTE — TELEPHONE ENCOUNTER
Mrs. Uriostegui's son, Sushil, called today. She has been complaining of headaches since yesterday morning and left shoulder pain. She has taken Tylenol and this has eased the pain. Sushil checked her blood pressure today right 166/61 and left 167/56.     Per Dr. Carranza, take an extra Atenolol 50mg now, as blood pressure is way too high. He would like to keep systolic at 130 or less. She is to see her PCP and cardiologist asap. Shoulder pain could be related to heart. Left shoulder pain started 2 week after surgery.     Explained this to Mrs. Uriostegui and left Sushil a voice message.

## 2017-07-24 ENCOUNTER — OFFICE VISIT (OUTPATIENT)
Dept: CARDIAC SURGERY | Facility: CLINIC | Age: 82
End: 2017-07-24

## 2017-07-24 ENCOUNTER — TELEPHONE (OUTPATIENT)
Dept: CARDIAC SURGERY | Facility: CLINIC | Age: 82
End: 2017-07-24

## 2017-07-24 VITALS
DIASTOLIC BLOOD PRESSURE: 49 MMHG | WEIGHT: 106 LBS | SYSTOLIC BLOOD PRESSURE: 129 MMHG | HEART RATE: 56 BPM | TEMPERATURE: 97.5 F | BODY MASS INDEX: 18.1 KG/M2 | OXYGEN SATURATION: 97 % | HEIGHT: 64 IN

## 2017-07-24 DIAGNOSIS — I65.22 STENOSIS OF LEFT CAROTID ARTERY: Primary | ICD-10-CM

## 2017-07-24 DIAGNOSIS — I65.22 CAROTID STENOSIS, LEFT: Primary | ICD-10-CM

## 2017-07-24 PROCEDURE — 99024 POSTOP FOLLOW-UP VISIT: CPT | Performed by: PHYSICIAN ASSISTANT

## 2017-07-24 RX ORDER — ACETAMINOPHEN 500 MG
500 TABLET ORAL EVERY 6 HOURS PRN
COMMUNITY
End: 2017-12-11

## 2017-07-24 NOTE — PROGRESS NOTES
07/24/2017  Patient Information  Kelly Uriostegui                                                                                          [address] [facility] [address]   1935  'PCP/Referring Physician'  Namita Ambrocio, APRN  [phone]  No ref. provider found    Chief Complaint   Patient presents with   • Follow-up     Patient complains of constant left arm pain and a large warm knot above her right wrist. She is s/p Left CEA and Left carotid subclavian bypass 7/11/17.   • Subclavian Artery Stenosis       History of Present Illness:  Patient presents to office today for precocious follow up post op visit of her left CEA with left carotid subclavian bypass on 7/11/17. She has left neck and arm pain as well as a painful bump on her right forearm. She states that she has neck pain from her left neck down to her left elbow, and it usually occurs at 0400 and wakes her up. I associated this to common post-operative musculoskeletal pain. She currently takes Tylenol Xtra strength twice a day, which she states helps but does not completely alleviate all of her pain. Though she does admit, her pain is getting better. Upon review of the painful bump on her right forearm, it appears that this is the site of a former IV which may have been infiltrated. I am still able to visualize the site where it was placed in the right Cephalic vein just distal to the bump. Patient does have a great left radial and ulnar pulse that is very easily palpable. The patient is concerned she may have an infection of her incision. She denies any fevers, chills, nausea/vomiting or dizziness. Her temperature today in office is 97.5. She is otherwise doing well and having common post-operative issues from surgery/hospitlization.     Patient Active Problem List   Diagnosis   • Essential hypertension   • Coronary artery disease involving native coronary artery of native heart without angina pectoris   • Palpitations   • Transient cerebral  ischemia   • Carotid artery stenosis   • Carotid bruit   • Subclavian artery stenosis, left   • Subclavian steal syndrome   • Hyperlipidemia   • Hypertension   • Hypothyroidism   • GERD (gastroesophageal reflux disease)   • Tobacco abuse   • COPD (chronic obstructive pulmonary disease)   • H/O CVA (cerebral vascular accident)   • Bilateral Carotid Artery Stenosis     Past Medical History:   Diagnosis Date   • Anemia    • Carotid stenosis    • Coronary artery disease    • CVA (cerebral vascular accident)    • GERD (gastroesophageal reflux disease)    • Hyperlipidemia    • Hypertension    • Hypothyroidism    • Macular degeneration      Past Surgical History:   Procedure Laterality Date   • CARDIAC CATHETERIZATION     • COLONOSCOPY      5-10 years ago   • EYE SURGERY      bilateral cataracts removed   • GALLBLADDER SURGERY     • HYSTERECTOMY     • [place] VEIN BYPASS GRAFT,CAROTI-SUBCL Left 7/11/2017    Procedure: LEFT CAROTID ENDARTERCTOMY WITH PATCH, LEFT CAROTID SUBCLAVIAN BYPASS;  Surgeon: Tyler Carranza MD;  Location: [facility];  Service: Vascular   • THYROID SURGERY         Current Outpatient Prescriptions:   •  acetaminophen (TYLENOL) 500 MG tablet, Take 500 mg by mouth Every 6 (Six) Hours As Needed for Mild Pain (1-3)., Disp: , Rfl:   •  amLODIPine (NORVASC) 10 MG tablet, Take 1 tablet by mouth Daily., Disp: 30 tablet, Rfl: 0  •  aspirin 81 MG EC tablet, Take 1 tablet by mouth daily., Disp: 30 tablet, Rfl: 3  •  atenolol (TENORMIN) 50 MG tablet, Take 1 tablet by mouth Daily. (Patient taking differently: Take 50 mg by mouth Daily. 1/2 tablet daily), Disp: 30 tablet, Rfl: 3  •  atorvastatin (LIPITOR) 40 MG tablet, Take 1 tablet by mouth Every Night., Disp: 30 tablet, Rfl: 0  •  Cholecalciferol (VITAMIN D3) 2000 UNITS tablet, Take 1 tablet by mouth daily., Disp: , Rfl:   •  docusate sodium (COLACE) 100 MG capsule, Take 100 mg by mouth 2 (two) times a day., Disp: , Rfl:   •  folic acid (FOLVITE) 1 MG tablet, Take 1  "mg by mouth daily., Disp: , Rfl:   •  loratadine (CLARITIN) 10 MG tablet, Take 10 mg by mouth Daily., Disp: , Rfl:   •  losartan (COZAAR) 50 MG tablet, Take 1 tablet by mouth 2 (Two) Times a Day. (Patient taking differently: Take 50 mg by mouth Daily.), Disp: 60 tablet, Rfl: 2  •  Multiple Vitamins-Minerals (ICAPS) capsule, Take 1 capsule by mouth 2 (two) times a day., Disp: , Rfl:   •  raNITIdine (ZANTAC) 150 MG tablet, Take 150 mg by mouth 2 (Two) Times a Day., Disp: , Rfl:   •  rivaroxaban (XARELTO) 15 MG tablet, Take 1 tablet by mouth Daily With Dinner., Disp: 15 tablet, Rfl: 0  •  triamterene-hydrochlorothiazide (MAXZIDE-25) 37.5-25 MG per tablet, Take 1 tablet by mouth daily., Disp: 30 tablet, Rfl: 3  Allergies   Allergen Reactions   • Kiwi Extract      \" chest pressure\"   • Penicillins Rash     Social History     Social History   • Marital status:      Spouse name: N/A   • Number of children: 2   • Years of education: N/A     Occupational History   •       Retired     Social History Main Topics   • Smoking status: Current Every Day Smoker     Packs/day: 0.50     Years: 45.00     Types: Cigarettes   • Smokeless tobacco: Never Used   • Alcohol use No   • Drug use: No   • Sexual activity: Defer     Other Topics Concern   • Not on file     Social History Narrative     Family History   Problem Relation Age of Onset   • Stroke Mother    • Hypertension Mother    • Hyperlipidemia Mother    • Heart disease Father    • Other Father      AAA     ROS: As per HPI, otherwise negative  Vitals:    07/24/17 1231   BP: 129/49   BP Location: Left arm   Pulse: 56   Temp: 97.5 °F (36.4 °C)   SpO2: 97%   Weight: 106 lb (48.1 kg)   Height: 64\" (162.6 cm)      Physical Exam:  Gen - NAD, pleasant, cooperative  CV - Regular rate and rhythm, no murmur gallop or rub  Pulm - Lungs clear to auscultation without wheeze or rhonchi   GI - Soft, normoactive bowel sounds, non-tender  Ext - Without edema, 3cm x 3cm nodule " on right forearm non-blanchable without evidence of purulence. There is a site where it appears an IV was placed just distal to this area. The left radial and ulnar artery are very easily palpable during examination.   Incision - Both the left CEA and subclavian incision sites are healing well without evidence of dehiscence or infection.   Neuro - CN II-XII grossly intact    Assessment/Plan:  Patient is doing well post-operatively. Her incisions are healing well and she has a bounding left radial artery pulse. She should continue tylenol BID to help with pain. She was encouraged to put warm compress on her left arm to help with post-operative pain. I informed her that the bump on her right forearm was due to an IV and would go away and may benefit from a warm compress, as well. She was given a 5 day Z-Parrish due to concern for the graft placed during bypass. She has an appointment to follow up with this office on August 9th but I made an appointment to see her one week earlier. She may come in and be seen if she is not doing better, but otherwise may cancel this appointment and keep her later date.         Patient Active Problem List   Diagnosis   • Essential hypertension   • Coronary artery disease involving native coronary artery of native heart without angina pectoris   • Palpitations   • Transient cerebral ischemia   • Carotid artery stenosis   • Carotid bruit   • Subclavian artery stenosis, left   • Subclavian steal syndrome   • Hyperlipidemia   • Hypertension   • Hypothyroidism   • GERD (gastroesophageal reflux disease)   • Tobacco abuse   • COPD (chronic obstructive pulmonary disease)   • H/O CVA (cerebral vascular accident)   • Bilateral Carotid Artery Stenosis     Signed by:

## 2017-07-31 RX ORDER — RIVAROXABAN 15 MG/1
TABLET, FILM COATED ORAL
Qty: 15 TABLET | Refills: 0 | OUTPATIENT
Start: 2017-07-31

## 2017-08-14 DIAGNOSIS — I10 ESSENTIAL HYPERTENSION: Primary | ICD-10-CM

## 2017-08-14 RX ORDER — AMLODIPINE BESYLATE 10 MG/1
10 TABLET ORAL DAILY
Qty: 30 TABLET | Refills: 4 | Status: SHIPPED | OUTPATIENT
Start: 2017-08-14 | End: 2017-08-16 | Stop reason: DRUGHIGH

## 2017-08-14 RX ORDER — AMLODIPINE BESYLATE 10 MG/1
TABLET ORAL
Qty: 30 TABLET | Refills: 0 | OUTPATIENT
Start: 2017-08-14

## 2017-08-14 NOTE — TELEPHONE ENCOUNTER
Spoke with patient's niece. Prescription was originally sent to Dr. Carranza's office who denied the refill and stated it needed to come from our office. I have instructed them I will send in the refill to her pharmacy.

## 2017-08-16 ENCOUNTER — TELEPHONE (OUTPATIENT)
Dept: CARDIOLOGY | Facility: CLINIC | Age: 82
End: 2017-08-16

## 2017-08-16 ENCOUNTER — OFFICE VISIT (OUTPATIENT)
Dept: CARDIAC SURGERY | Facility: CLINIC | Age: 82
End: 2017-08-16

## 2017-08-16 VITALS
HEIGHT: 64 IN | SYSTOLIC BLOOD PRESSURE: 108 MMHG | WEIGHT: 104.6 LBS | HEART RATE: 59 BPM | BODY MASS INDEX: 17.86 KG/M2 | OXYGEN SATURATION: 98 % | DIASTOLIC BLOOD PRESSURE: 50 MMHG | TEMPERATURE: 97.5 F

## 2017-08-16 DIAGNOSIS — I77.1 SUBCLAVIAN ARTERY STENOSIS, LEFT (HCC): Primary | ICD-10-CM

## 2017-08-16 PROCEDURE — 99024 POSTOP FOLLOW-UP VISIT: CPT | Performed by: PHYSICIAN ASSISTANT

## 2017-08-16 RX ORDER — CLOPIDOGREL BISULFATE 75 MG/1
75 TABLET ORAL DAILY
COMMUNITY
End: 2017-12-11 | Stop reason: SDUPTHER

## 2017-08-16 RX ORDER — AMLODIPINE BESYLATE 5 MG/1
5 TABLET ORAL DAILY
Qty: 30 TABLET | Refills: 4
Start: 2017-08-16

## 2017-08-16 NOTE — TELEPHONE ENCOUNTER
"----- Message from Norm Yates sent at 8/16/2017  1:27 PM EDT -----  Contact: ADAM MITCHELL  PT PAULA SAID THEY WENT TO [place] TODAY FOR PATIENT TO  FOLLOW UP WITH DR ORELILY'S PA.  HER  B/P WAS  108/50 AND SHE  UNSTEADY ON HER FEET. THE PA IN [place] TOLD HER TO CALL OUR OFFICE TO TALK TO A NURSE REGARDING MEDICATION.    Per patient she will have, \"dizzy spells and has been staggering a lot.\" She does not currently monitor her blood pressure so she is unsure what it has been running. Per Guillaume Daugherty [facility] patient to decrease Amlodipine 10 mg to 1/2 a tab daily. Patient's niece states she is going to get a BP cuff so that she is able to monitor her BP. Patient is instructed to call our office for any further problems.   "

## 2017-08-16 NOTE — PROGRESS NOTES
08/16/2017  Patient Information  Kelly Uriostegui                                                                                          [address] [place] [id number]   1935  'PCP/Referring Physician'  Namita Ambrocio, APRN  [phone]  No ref. provider found    Chief Complaint   Patient presents with   • Arm Pain     S/P left CEA and left subclavian bypass 7/11/17* Patient complains of left arm pain       History of Present Illness:  Patient presents to office today for postoperative office follow up of 7/11/17 left CEA with left subclavian bypass. She was previously seen in office on 7/24/17 for concern of infection of her incision. She completed the antibiotics she was given at that time. She is interested in receiving a prescription for a nicotine patch to help her quit smoking. She denies any unilateral weakness, headaches, nausea, vomiting, fevers or chills. She does have a slight draw of her left face causing the left part of her lip to be under her tooth and she keeps biting it. I informed her this is common after a carotid surgery and will resolve with time. She is otherwise doing well.     Patient Active Problem List   Diagnosis   • Essential hypertension   • Coronary artery disease involving native coronary artery of native heart without angina pectoris   • Palpitations   • Transient cerebral ischemia   • Carotid artery stenosis   • Carotid bruit   • Subclavian artery stenosis, left   • Subclavian steal syndrome   • Hyperlipidemia   • Hypertension   • Hypothyroidism   • GERD (gastroesophageal reflux disease)   • Tobacco abuse   • COPD (chronic obstructive pulmonary disease)   • H/O CVA (cerebral vascular accident)   • Bilateral Carotid Artery Stenosis     Past Medical History:   Diagnosis Date   • Anemia    • Carotid stenosis    • Coronary artery disease    • CVA (cerebral vascular accident)    • GERD (gastroesophageal reflux disease)    • Hyperlipidemia    • Hypertension    •  Hypothyroidism    • Macular degeneration      Past Surgical History:   Procedure Laterality Date   • CARDIAC CATHETERIZATION     • COLONOSCOPY      5-10 years ago   • EYE SURGERY      bilateral cataracts removed   • GALLBLADDER SURGERY     • HYSTERECTOMY     • [place] VEIN BYPASS GRAFT,CAROTI-SUBCL Left 7/11/2017    Procedure: LEFT CAROTID ENDARTERCTOMY WITH PATCH, LEFT CAROTID SUBCLAVIAN BYPASS;  Surgeon: Tyler Carranza MD;  Location: [facility];  Service: Vascular   • THYROID SURGERY         Current Outpatient Prescriptions:   •  acetaminophen (TYLENOL) 500 MG tablet, Take 500 mg by mouth Every 6 (Six) Hours As Needed for Mild Pain (1-3)., Disp: , Rfl:   •  amLODIPine (NORVASC) 10 MG tablet, Take 1 tablet by mouth Daily., Disp: 30 tablet, Rfl: 4  •  aspirin 81 MG EC tablet, Take 1 tablet by mouth daily., Disp: 30 tablet, Rfl: 3  •  atenolol (TENORMIN) 50 MG tablet, Take 1 tablet by mouth Daily. (Patient taking differently: Take 50 mg by mouth Daily. 1/2 tablet daily), Disp: 30 tablet, Rfl: 3  •  atorvastatin (LIPITOR) 40 MG tablet, Take 1 tablet by mouth Every Night., Disp: 30 tablet, Rfl: 0  •  Cholecalciferol (VITAMIN D3) 2000 UNITS tablet, Take 1 tablet by mouth daily., Disp: , Rfl:   •  clopidogrel (PLAVIX) 75 MG tablet, Take 75 mg by mouth Daily., Disp: , Rfl:   •  docusate sodium (COLACE) 100 MG capsule, Take 100 mg by mouth 2 (two) times a day., Disp: , Rfl:   •  folic acid (FOLVITE) 1 MG tablet, Take 1 mg by mouth daily., Disp: , Rfl:   •  loratadine (CLARITIN) 10 MG tablet, Take 10 mg by mouth Daily., Disp: , Rfl:   •  losartan (COZAAR) 50 MG tablet, Take 1 tablet by mouth 2 (Two) Times a Day. (Patient taking differently: Take 50 mg by mouth Daily.), Disp: 60 tablet, Rfl: 2  •  Multiple Vitamins-Minerals (ICAPS) capsule, Take 1 capsule by mouth 2 (two) times a day., Disp: , Rfl:   •  raNITIdine (ZANTAC) 150 MG tablet, Take 150 mg by mouth 2 (Two) Times a Day., Disp: , Rfl:   •   "triamterene-hydrochlorothiazide (MAXZIDE-25) 37.5-25 MG per tablet, Take 1 tablet by mouth daily., Disp: 30 tablet, Rfl: 3  •  rivaroxaban (XARELTO) 15 MG tablet, Take 1 tablet by mouth Daily With Dinner., Disp: 15 tablet, Rfl: 0  Allergies   Allergen Reactions   • Kiwi Extract      \" chest pressure\"   • Penicillins Rash     Social History     Social History   • Marital status:      Spouse name: N/A   • Number of children: 2   • Years of education: N/A     Occupational History   •       Retired     Social History Main Topics   • Smoking status: Current Every Day Smoker     Packs/day: 0.50     Years: 45.00     Types: Cigarettes   • Smokeless tobacco: Never Used   • Alcohol use No   • Drug use: No   • Sexual activity: Defer     Other Topics Concern   • Not on file     Social History Narrative    Lives in [place], [place] with brother and niece     Family History   Problem Relation Age of Onset   • Stroke Mother    • Hypertension Mother    • Hyperlipidemia Mother    • Heart disease Father    • Other Father      AAA     ROS: As per HPI, otherwise negative.  Vitals:    08/16/17 1017   BP: 108/50   BP Location: Right arm   Patient Position: Sitting   Pulse: 59   Temp: 97.5 °F (36.4 °C)   SpO2: 98%   Weight: 104 lb 9.6 oz (47.4 kg)   Height: 64\" (162.6 cm)      Physical Exam:  Gen - NAD, pleasant, cooperative  CV - Regular rate and rhythm, no murmur gallop or rub  Pulm - Lungs clear to auscultation without wheeze or rhonchi   GI - Soft, normoactive bowel sounds, non-tender  Incision - Incision healing very well   Pulses - Left radial and ulnar pulse are easily palpated   Neuro - CN II-XII grossly intact, left lower lip pulled laterally, voice normal    Assessment/Plan:  82 year old  female with history of carotid artery disease s/p 7/11/17 left CEA. Her incision has healed very well. She is concerned about her left lip and the fact that she keeps biting it. I will refer her to a speech therapist " who may be able to help. She will also be given a script for a nicotine patch, once her refills run out I told her she will need to receive the rest from her PCP. She is otherwise doing well and will need to follow up with our office in one year for a carotid duplex. She may call with any questions or concerns in the meantime.       Patient Active Problem List   Diagnosis   • Essential hypertension   • Coronary artery disease involving native coronary artery of native heart without angina pectoris   • Palpitations   • Transient cerebral ischemia   • Carotid artery stenosis   • Carotid bruit   • Subclavian artery stenosis, left   • Subclavian steal syndrome   • Hyperlipidemia   • Hypertension   • Hypothyroidism   • GERD (gastroesophageal reflux disease)   • Tobacco abuse   • COPD (chronic obstructive pulmonary disease)   • H/O CVA (cerebral vascular accident)   • Bilateral Carotid Artery Stenosis     Signed by:

## 2017-08-21 DIAGNOSIS — I10 ESSENTIAL HYPERTENSION: ICD-10-CM

## 2017-08-21 RX ORDER — LOSARTAN POTASSIUM 50 MG/1
50 TABLET ORAL DAILY
Qty: 90 TABLET | Refills: 3 | Status: SHIPPED | OUTPATIENT
Start: 2017-08-21 | End: 2017-08-28 | Stop reason: SDUPTHER

## 2017-08-21 RX ORDER — LOSARTAN POTASSIUM 50 MG/1
TABLET ORAL
Qty: 30 TABLET | Refills: 3 | Status: SHIPPED | OUTPATIENT
Start: 2017-08-21 | End: 2017-08-28

## 2017-08-28 ENCOUNTER — TELEPHONE (OUTPATIENT)
Dept: CARDIOLOGY | Facility: CLINIC | Age: 82
End: 2017-08-28

## 2017-08-28 DIAGNOSIS — I10 ESSENTIAL HYPERTENSION: Primary | ICD-10-CM

## 2017-08-28 DIAGNOSIS — I10 ESSENTIAL HYPERTENSION: ICD-10-CM

## 2017-08-28 RX ORDER — TRIAMTERENE AND HYDROCHLOROTHIAZIDE 37.5; 25 MG/1; MG/1
TABLET ORAL
Qty: 30 TABLET | Refills: 5 | Status: SHIPPED | OUTPATIENT
Start: 2017-08-28 | End: 2018-03-12

## 2017-08-28 RX ORDER — CLOPIDOGREL BISULFATE 75 MG/1
TABLET ORAL
Qty: 30 TABLET | Refills: 3 | Status: SHIPPED | OUTPATIENT
Start: 2017-08-28 | End: 2018-01-11 | Stop reason: SDUPTHER

## 2017-08-28 RX ORDER — LOSARTAN POTASSIUM 50 MG/1
50 TABLET ORAL DAILY
Qty: 90 TABLET | Refills: 3 | Status: SHIPPED | OUTPATIENT
Start: 2017-08-28 | End: 2018-03-12

## 2017-08-28 NOTE — TELEPHONE ENCOUNTER
----- Message from Norm Yates sent at 8/28/2017  1:37 PM EDT -----  Contact: YESENIA  PT IS SAYING SHE IS RUNNING OUT OF  PRESCRIPTION FOR LOSARTAN . WHEN  SHE GOT OUT OF [facility] THEY WROTE THE PRESCRIPTION FOR 2X DAILY AND THE BOTTLE THAT WE HAVE HER ON IS 1X DAILY.  PT NEEDS CLARIFICATION ON WHAT  SHE IS TO BE TAKING IT 1X OR 2X DAILY. PLEASE CALL HER BACK  ON HER NIECE'S PHONE (BERNADINE) -[phone].    Spoke with patient's niece, Bernadine and advised Losartan is 50 mg once a day. New script sent to patient's pharmacy.

## 2017-12-11 ENCOUNTER — OFFICE VISIT (OUTPATIENT)
Dept: CARDIOLOGY | Facility: CLINIC | Age: 82
End: 2017-12-11

## 2017-12-11 VITALS
WEIGHT: 107.6 LBS | SYSTOLIC BLOOD PRESSURE: 126 MMHG | DIASTOLIC BLOOD PRESSURE: 60 MMHG | OXYGEN SATURATION: 97 % | BODY MASS INDEX: 18.37 KG/M2 | HEIGHT: 64 IN | HEART RATE: 68 BPM

## 2017-12-11 DIAGNOSIS — I65.22 STENOSIS OF LEFT CAROTID ARTERY: ICD-10-CM

## 2017-12-11 DIAGNOSIS — I77.1 SUBCLAVIAN ARTERY STENOSIS, LEFT (HCC): Primary | ICD-10-CM

## 2017-12-11 DIAGNOSIS — I73.9 PVD (PERIPHERAL VASCULAR DISEASE) (HCC): ICD-10-CM

## 2017-12-11 DIAGNOSIS — I10 ESSENTIAL HYPERTENSION: ICD-10-CM

## 2017-12-11 PROCEDURE — 99213 OFFICE O/P EST LOW 20 MIN: CPT | Performed by: PHYSICIAN ASSISTANT

## 2017-12-11 RX ORDER — ATENOLOL 50 MG/1
50 TABLET ORAL DAILY
Qty: 30 TABLET | Refills: 3 | Status: SHIPPED | OUTPATIENT
Start: 2017-12-11

## 2017-12-11 NOTE — PROGRESS NOTES
Problem list     Subjective   Kelly Uriostegui is a 82 y.o. female     Chief Complaint   Patient presents with   • Follow-up     patient appears in office today for 2 month follow up    • Shortness of Breath     patient states she has SOA on exertion only    • Coronary Artery Disease     patient has H/O CAD        HPI  The patient presents back today for evaluation.  She is accompanied by her niece who has several questions regarding her surgery.  The patient had a left carotid to subclavian bypass and concurrent carotid endarterectomy.  She is concerned because she never improved post surgery.  She continues to have incoordination, which she reports to me was her biggest issue before surgery.  We discussed dizziness in the past, for which the patient was fairly symptomatic with at one point in the past.  She tells me that her dizziness is now nonproblematic, leading me to believe she did benefit from the surgery.  But the patient and her niece however contends that she has not.  By description, her incoordination is more of an issue with proprioception.  The patient has no current chest pain.  She has stable dyspnea.  She denies symptoms of dysrhythmias.  The patient has no further complaints otherwise at this time.     Current Outpatient Prescriptions   Medication Sig Dispense Refill   • amLODIPine (NORVASC) 5 MG tablet Take 1 tablet by mouth Daily. (Patient taking differently: Take 5 mg by mouth Daily. 1/2 tablet daily) 30 tablet 4   • aspirin 81 MG EC tablet Take 1 tablet by mouth daily. 30 tablet 3   • atenolol (TENORMIN) 50 MG tablet Take 1 tablet by mouth Daily. 1/2 tablet daily 30 tablet 3   • atorvastatin (LIPITOR) 40 MG tablet Take 1 tablet by mouth Every Night. 30 tablet 0   • Cholecalciferol (VITAMIN D3) 2000 UNITS tablet Take 1 tablet by mouth daily.     • clopidogrel (PLAVIX) 75 MG tablet TAKE ONE TABLET BY MOUTH ONCE DAILY 30 tablet 3   • docusate sodium (COLACE) 100 MG capsule Take 100 mg by mouth  2 (two) times a day.     • folic acid (FOLVITE) 1 MG tablet Take 1 mg by mouth daily.     • loratadine (CLARITIN) 10 MG tablet Take 10 mg by mouth Daily.     • losartan (COZAAR) 50 MG tablet Take 1 tablet by mouth Daily. 90 tablet 3   • Multiple Vitamins-Minerals (ICAPS) capsule Take 1 capsule by mouth 2 (two) times a day.     • raNITIdine (ZANTAC) 150 MG tablet Take 150 mg by mouth 2 (Two) Times a Day.     • triamterene-hydrochlorothiazide (MAXZIDE-25) 37.5-25 MG per tablet TAKE ONE TABLET BY MOUTH ONCE DAILY 30 tablet 5     No current facility-administered medications for this visit.        Kiwi extract and Penicillins    Past Medical History:   Diagnosis Date   • Anemia    • Carotid stenosis    • Coronary artery disease    • CVA (cerebral vascular accident)    • GERD (gastroesophageal reflux disease)    • Hyperlipidemia    • Hypertension    • Hypothyroidism    • Macular degeneration        Social History     Social History   • Marital status:      Spouse name: N/A   • Number of children: 2   • Years of education: N/A     Occupational History   •       Retired     Social History Main Topics   • Smoking status: Former Smoker     Packs/day: 0.50     Years: 45.00     Types: Cigarettes   • Smokeless tobacco: Never Used   • Alcohol use No   • Drug use: No   • Sexual activity: Defer     Other Topics Concern   • Not on file     Social History Narrative    Lives in [place], [place] with brother and niece       Family History   Problem Relation Age of Onset   • Stroke Mother    • Hypertension Mother    • Hyperlipidemia Mother    • Heart disease Father    • Other Father      AAA       Review of Systems   Constitutional: Negative.  Negative for fatigue.   HENT: Positive for sneezing (runny nose due to allergies). Negative for congestion, rhinorrhea and sore throat.    Eyes: Positive for visual disturbance (wears glasses daily).   Respiratory: Positive for shortness of breath (on exertion only). Negative for  "apnea, cough, chest tightness and wheezing.    Cardiovascular: Negative.  Negative for chest pain (denies CP), palpitations (denies palpitations) and leg swelling.   Gastrointestinal: Negative.  Negative for abdominal distention, abdominal pain, nausea and vomiting.   Endocrine: Negative.  Negative for cold intolerance, heat intolerance, polyphagia and polyuria.   Genitourinary: Negative.  Negative for difficulty urinating, frequency and urgency.   Musculoskeletal: Negative.  Negative for arthralgias, back pain, myalgias, neck pain and neck stiffness.   Skin: Negative.  Negative for rash and wound.   Allergic/Immunologic: Positive for environmental allergies (seasonal allergies) and food allergies (kiwi).   Neurological: Positive for light-headedness (light headedness with hypotension). Negative for dizziness, weakness and headaches.   Hematological: Bruises/bleeds easily (bruises and bleeds easily).   Psychiatric/Behavioral: Positive for confusion (easily confused). Negative for agitation and sleep disturbance (denies waking up smothering/SOA). The patient is not nervous/anxious.        Objective   Vitals:    12/11/17 1333 12/11/17 1421 12/11/17 1422 12/11/17 1423   BP: (!) 86/31 123/63 143/66 126/60   BP Location: Left arm Left arm Left arm Left arm   Patient Position: Sitting Lying Sitting Standing   Pulse: 66 62 63 68   SpO2: 97%      Weight: 48.8 kg (107 lb 9.6 oz)      Height: 162.6 cm (64\")         /60 (BP Location: Left arm, Patient Position: Standing)  Pulse 68  Ht 162.6 cm (64\")  Wt 48.8 kg (107 lb 9.6 oz)  SpO2 97%  BMI 18.47 kg/m2   Lab Results (most recent)     None        Physical Exam   Constitutional: She is oriented to person, place, and time. She appears well-developed and well-nourished. No distress.   HENT:   Head: Normocephalic and atraumatic.   Eyes: EOM are normal. Pupils are equal, round, and reactive to light.   Neck: No JVD present. Carotid bruit is present (Bilat carotid " bruits).   Cardiovascular: Normal rate and regular rhythm.  Exam reveals no gallop and no friction rub.    Murmur heard.   Systolic (Second RICS to LLSB) murmur is present with a grade of 2/6   Pulses:       Radial pulses are 1+ on the left side.   Pulmonary/Chest: Effort normal and breath sounds normal. No respiratory distress. She has no wheezes. She has no rales. She exhibits no tenderness.   Musculoskeletal: Normal range of motion. She exhibits no edema.   Neurological: She is alert and oriented to person, place, and time. No cranial nerve deficit.   Skin: Skin is warm and dry. No rash noted. No erythema. No pallor.   Psychiatric: She has a normal mood and affect. Her behavior is normal.   Nursing note and vitals reviewed.        Procedure   Procedures       Assessment/Plan      Diagnosis Plan   1. Subclavian artery stenosis, left     2. Essential hypertension  atenolol (TENORMIN) 50 MG tablet   3. PVD (peripheral vascular disease)     4. Stenosis of left carotid artery        I feel that the patient is stable post recent left carotid to subclavian bypass and carotid endarterectomy.  She has no further specific cardiac issues.  I feel nothing further is indicated.  We will see the patient back in 3 months, sooner if indicated by course.                  Electronically signed by:

## 2018-01-11 RX ORDER — CLOPIDOGREL BISULFATE 75 MG/1
TABLET ORAL
Qty: 30 TABLET | Refills: 3 | Status: SHIPPED | OUTPATIENT
Start: 2018-01-11 | End: 2018-06-14 | Stop reason: SDUPTHER

## 2018-02-15 ENCOUNTER — CLINICAL SUPPORT (OUTPATIENT)
Dept: CARDIOLOGY | Facility: CLINIC | Age: 83
End: 2018-02-15

## 2018-02-15 VITALS
DIASTOLIC BLOOD PRESSURE: 62 MMHG | WEIGHT: 115 LBS | HEART RATE: 61 BPM | HEIGHT: 64 IN | SYSTOLIC BLOOD PRESSURE: 132 MMHG | OXYGEN SATURATION: 97 % | BODY MASS INDEX: 19.63 KG/M2

## 2018-02-15 DIAGNOSIS — R00.2 PALPITATIONS: Primary | ICD-10-CM

## 2018-02-15 PROCEDURE — 93000 ELECTROCARDIOGRAM COMPLETE: CPT | Performed by: PHYSICIAN ASSISTANT

## 2018-02-15 NOTE — PROGRESS NOTES
Kelly MARTINEZ [place]  1935  2/15/2018   ?   Chief Complaint   Patient presents with   • Coronary Artery Disease     patient presents as a nurse visit       ?   HPI: patient was sent over by [facility] to have a EKG done. Patient stated that they listened to her during an exam and noticed an irregular heart beat. They also sent a clearance over with the patient.   ?   ?   ?     Current Outpatient Prescriptions:   •  amLODIPine (NORVASC) 5 MG tablet, Take 1 tablet by mouth Daily. (Patient taking differently: Take 5 mg by mouth Daily. 1/2 tablet daily), Disp: 30 tablet, Rfl: 4  •  aspirin 81 MG EC tablet, Take 1 tablet by mouth daily., Disp: 30 tablet, Rfl: 3  •  atenolol (TENORMIN) 50 MG tablet, Take 1 tablet by mouth Daily. 1/2 tablet daily, Disp: 30 tablet, Rfl: 3  •  atorvastatin (LIPITOR) 40 MG tablet, Take 1 tablet by mouth Every Night., Disp: 30 tablet, Rfl: 0  •  Cholecalciferol (VITAMIN D3) 2000 UNITS tablet, Take 1 tablet by mouth daily., Disp: , Rfl:   •  clopidogrel (PLAVIX) 75 MG tablet, TAKE ONE TABLET BY MOUTH ONCE DAILY, Disp: 30 tablet, Rfl: 3  •  docusate sodium (COLACE) 100 MG capsule, Take 100 mg by mouth 2 (two) times a day., Disp: , Rfl:   •  folic acid (FOLVITE) 1 MG tablet, Take 1 mg by mouth daily., Disp: , Rfl:   •  loratadine (CLARITIN) 10 MG tablet, Take 10 mg by mouth Daily., Disp: , Rfl:   •  losartan (COZAAR) 50 MG tablet, Take 1 tablet by mouth Daily., Disp: 90 tablet, Rfl: 3  •  Multiple Vitamins-Minerals (ICAPS) capsule, Take 1 capsule by mouth 2 (two) times a day., Disp: , Rfl:   •  raNITIdine (ZANTAC) 150 MG tablet, Take 150 mg by mouth 2 (Two) Times a Day., Disp: , Rfl:   •  triamterene-hydrochlorothiazide (MAXZIDE-25) 37.5-25 MG per tablet, TAKE ONE TABLET BY MOUTH ONCE DAILY, Disp: 30 tablet, Rfl: 5   ?   ?   Kiwi extract and Penicillins         ECG 12 Lead  Date/Time: 2/15/2018 4:40 PM  Performed by: TAYA VAZQUEZ  Authorized by: TAYA VAZQUEZ                 ?   Assessment/Plan  Per Ken Medina PA-C; patient's EKG is good so we can get the patient the surgical clearance.   ? a copy was given to the patient to take back to the office    ?   ?

## 2018-03-12 ENCOUNTER — OFFICE VISIT (OUTPATIENT)
Dept: CARDIOLOGY | Facility: CLINIC | Age: 83
End: 2018-03-12

## 2018-03-12 VITALS
HEIGHT: 64 IN | SYSTOLIC BLOOD PRESSURE: 138 MMHG | HEART RATE: 60 BPM | DIASTOLIC BLOOD PRESSURE: 66 MMHG | OXYGEN SATURATION: 96 % | BODY MASS INDEX: 19.5 KG/M2 | WEIGHT: 114.2 LBS

## 2018-03-12 DIAGNOSIS — E78.2 MIXED HYPERLIPIDEMIA: ICD-10-CM

## 2018-03-12 DIAGNOSIS — I65.23 BILATERAL CAROTID ARTERY STENOSIS: Primary | ICD-10-CM

## 2018-03-12 DIAGNOSIS — R09.89 BILATERAL CAROTID BRUITS: ICD-10-CM

## 2018-03-12 PROCEDURE — 99213 OFFICE O/P EST LOW 20 MIN: CPT | Performed by: PHYSICIAN ASSISTANT

## 2018-03-12 NOTE — PROGRESS NOTES
Problem list     Subjective   Kelly Uriostegui is a 82 y.o. female     Chief Complaint   Patient presents with   • Follow-up     jignesh appears in office today for 3 month follow up    • Subclavian Artery Stenosis       HPI  The patient presents back in for routine evaluation.  She is status post left carotid to subclavian bypass and concurrent carotid endarterectomy.  The patient tells me that she is doing reasonably well at this time.  She denies chest pain.  She reports stable dyspnea.  She has chronic recurrent dizziness.  She has had a couple of falls since her last appointment here which she relates more to incoordination.  She reports that the dizziness does not cause falls.  She has no dysrhythmic symptoms.  She tells me that blood pressures of been normal at home.  Labs are followed by her primary care provider and apparently are normal.  She has no further complaints otherwise.    Current Outpatient Prescriptions   Medication Sig Dispense Refill   • amLODIPine (NORVASC) 5 MG tablet Take 1 tablet by mouth Daily. (Patient taking differently: Take 5 mg by mouth Daily. 1/2 tablet daily) 30 tablet 4   • aspirin 81 MG EC tablet Take 1 tablet by mouth daily. 30 tablet 3   • atenolol (TENORMIN) 50 MG tablet Take 1 tablet by mouth Daily. 1/2 tablet daily 30 tablet 3   • atorvastatin (LIPITOR) 40 MG tablet Take 1 tablet by mouth Every Night. 30 tablet 0   • Cholecalciferol (VITAMIN D3) 2000 UNITS tablet Take 1 tablet by mouth daily.     • clopidogrel (PLAVIX) 75 MG tablet TAKE ONE TABLET BY MOUTH ONCE DAILY 30 tablet 3   • docusate sodium (COLACE) 100 MG capsule Take 100 mg by mouth 2 (two) times a day.     • folic acid (FOLVITE) 1 MG tablet Take 1 mg by mouth daily.     • loratadine (CLARITIN) 10 MG tablet Take 10 mg by mouth Daily.     • Multiple Vitamins-Minerals (ICAPS) capsule Take 1 capsule by mouth 2 (two) times a day.     • raNITIdine (ZANTAC) 150 MG tablet Take 150 mg by mouth 2 (Two) Times a Day.        No current facility-administered medications for this visit.        Kiwi extract and Penicillins    Past Medical History:   Diagnosis Date   • Anemia    • Anemia    • Carotid stenosis    • Coronary artery disease    • CVA (cerebral vascular accident)    • GERD (gastroesophageal reflux disease)    • Hyperlipidemia    • Hypertension    • Hypothyroidism    • Macular degeneration        Social History     Social History   • Marital status:      Spouse name: N/A   • Number of children: 2   • Years of education: N/A     Occupational History   •       Retired     Social History Main Topics   • Smoking status: Former Smoker     Packs/day: 0.50     Years: 45.00     Types: Cigarettes   • Smokeless tobacco: Never Used   • Alcohol use No   • Drug use: No   • Sexual activity: Defer     Other Topics Concern   • Not on file     Social History Narrative    Lives in [place], [place] with brother and niece       Family History   Problem Relation Age of Onset   • Stroke Mother    • Hypertension Mother    • Hyperlipidemia Mother    • Heart disease Father    • Other Father      AAA       Review of Systems   Constitutional: Negative.  Negative for fatigue.   HENT: Positive for sneezing (due to seasonal allergies). Negative for congestion, rhinorrhea and sore throat.    Eyes: Positive for visual disturbance (wears glasses daily).   Respiratory: Negative for apnea, cough, chest tightness, shortness of breath (denies SOA) and wheezing.    Cardiovascular: Negative.  Negative for chest pain (denies CP), palpitations (denies palpitations) and leg swelling.   Gastrointestinal: Negative.  Negative for abdominal distention, abdominal pain, nausea and vomiting.   Endocrine: Negative.  Negative for cold intolerance, heat intolerance, polyphagia and polyuria.   Genitourinary: Negative.  Negative for difficulty urinating, frequency and urgency.   Musculoskeletal: Negative for arthralgias, back pain, myalgias, neck pain and neck  "stiffness.   Skin: Negative.  Negative for rash and wound.   Allergic/Immunologic: Positive for environmental allergies (seasonal allergies) and food allergies (kiwi).   Neurological: Negative.  Negative for dizziness, weakness, light-headedness and headaches.   Hematological: Bruises/bleeds easily (bruises and bleeds easily).   Psychiatric/Behavioral: Positive for agitation (easily agitated) and confusion (easily confused). Negative for sleep disturbance (denies waking up smothering/SOA). The patient is not nervous/anxious.        Objective   Vitals:    03/12/18 1304   BP: 138/66   BP Location: Left arm   Patient Position: Sitting   Pulse: 60   SpO2: 96%   Weight: 51.8 kg (114 lb 3.2 oz)   Height: 162.6 cm (64\")      /66 (BP Location: Left arm, Patient Position: Sitting)   Pulse 60   Ht 162.6 cm (64\")   Wt 51.8 kg (114 lb 3.2 oz)   SpO2 96%   BMI 19.60 kg/m²    Lab Results (most recent)     None        Physical Exam   Constitutional: She is oriented to person, place, and time. She appears well-developed and well-nourished. No distress.   HENT:   Head: Normocephalic and atraumatic.   Eyes: EOM are normal. Pupils are equal, round, and reactive to light.   Neck: No JVD present. Carotid bruit is present (Bilat carotid bruits).   Cardiovascular: Normal rate and regular rhythm.  Exam reveals no gallop and no friction rub.    Murmur heard.   Systolic (Second RICS to LLSB) murmur is present with a grade of 2/6   Pulses:       Radial pulses are 1+ on the left side.   Pulmonary/Chest: Effort normal and breath sounds normal. No respiratory distress. She has no wheezes. She has no rales. She exhibits no tenderness.   Musculoskeletal: Normal range of motion. She exhibits no edema.   Neurological: She is alert and oriented to person, place, and time. No cranial nerve deficit.   Skin: Skin is warm and dry. No rash noted. No erythema. No pallor.   Psychiatric: She has a normal mood and affect. Her behavior is normal. "   Nursing note and vitals reviewed.        Procedure   Procedures       Assessment/Plan      Diagnosis Plan   1. Bilateral carotid artery stenosis  US Carotid Bilateral   2. Bilateral carotid bruits  US Carotid Bilateral   3. Mixed hyperlipidemia  US Carotid Bilateral     I would like to repeat a carotid duplex.  Otherwise, we will continue medications with no changes.  She will call to us for any issues.  Otherwise, we'll we'll see her back in 6 months, sooner if indicated by clinical course or carotid duplex findings.              Discussed the patient's BMI with her. BMI is within normal parameters. No follow-up required.             Electronically signed by:

## 2018-03-15 ENCOUNTER — HOSPITAL ENCOUNTER (OUTPATIENT)
Dept: CARDIOLOGY | Facility: HOSPITAL | Age: 83
Discharge: HOME | End: 2018-03-15
Admitting: PHYSICIAN ASSISTANT

## 2018-03-15 DIAGNOSIS — E78.2 MIXED HYPERLIPIDEMIA: ICD-10-CM

## 2018-03-15 DIAGNOSIS — R09.89 BILATERAL CAROTID BRUITS: ICD-10-CM

## 2018-03-15 DIAGNOSIS — I65.23 BILATERAL CAROTID ARTERY STENOSIS: ICD-10-CM

## 2018-03-15 PROCEDURE — 93880 EXTRACRANIAL BILAT STUDY: CPT | Performed by: INTERNAL MEDICINE

## 2018-03-15 PROCEDURE — 93880 EXTRACRANIAL BILAT STUDY: CPT

## 2018-06-15 RX ORDER — CLOPIDOGREL BISULFATE 75 MG/1
TABLET ORAL
Qty: 30 TABLET | Refills: 3 | Status: SHIPPED | OUTPATIENT
Start: 2018-06-15

## 2018-07-06 DIAGNOSIS — I10 ESSENTIAL HYPERTENSION: ICD-10-CM

## 2018-07-09 RX ORDER — AMLODIPINE BESYLATE 10 MG/1
TABLET ORAL
Qty: 30 TABLET | Refills: 4 | OUTPATIENT
Start: 2018-07-09

## 2018-07-10 DIAGNOSIS — I10 ESSENTIAL HYPERTENSION: ICD-10-CM

## 2018-07-10 RX ORDER — AMLODIPINE BESYLATE 10 MG/1
TABLET ORAL
Qty: 30 TABLET | Refills: 4 | OUTPATIENT
Start: 2018-07-10

## 2018-07-10 NOTE — TELEPHONE ENCOUNTER
ELECTRONIC REFILL REQUEST FROM [facility] FOR AMLODIPINE 10 MG. PATIENT CHART SHOWS SHE ONLY TAKES 5MG. PATIENT WAS CALLED TO VERIFY RX DOSAGE. PATIENT WAS UNSURE OF HER MEDICATIONS AS HER DAUGHTER TAKES CARE OF THOSE FOR HER. SHE STATED SHE WOULD HAVE HER DAUGHTER CALL US WITH THAT INFORMATION.   [facility] WAS CALLED ABOUT THE DISCREPANCY IN HER MEDICATION. VERBAL ORDER TO FILL THE 5MG UNTIL WE HEAR BACK FROM THE PATIENT. JYOTI BELLA

## 2018-07-19 ENCOUNTER — TELEPHONE (OUTPATIENT)
Dept: CARDIAC SURGERY | Facility: CLINIC | Age: 83
End: 2018-07-19

## 2018-07-19 DIAGNOSIS — I65.29 OCCLUSION AND STENOSIS OF CAROTID ARTERY: Primary | ICD-10-CM

## 2018-07-19 DIAGNOSIS — I65.23 CAROTID STENOSIS, ASYMPTOMATIC, BILATERAL: ICD-10-CM

## 2018-08-06 ENCOUNTER — OFFICE VISIT (OUTPATIENT)
Dept: CARDIAC SURGERY | Facility: CLINIC | Age: 83
End: 2018-08-06

## 2018-08-06 VITALS
WEIGHT: 117.6 LBS | HEIGHT: 64 IN | HEART RATE: 71 BPM | DIASTOLIC BLOOD PRESSURE: 70 MMHG | BODY MASS INDEX: 20.08 KG/M2 | SYSTOLIC BLOOD PRESSURE: 160 MMHG | OXYGEN SATURATION: 97 % | TEMPERATURE: 97.9 F

## 2018-08-06 DIAGNOSIS — I77.9 CAROTID DISEASE, BILATERAL (HCC): Primary | ICD-10-CM

## 2018-08-06 PROCEDURE — 99214 OFFICE O/P EST MOD 30 MIN: CPT | Performed by: THORACIC SURGERY (CARDIOTHORACIC VASCULAR SURGERY)

## 2018-08-06 NOTE — PROGRESS NOTES
08/06/2018  Patient Information  Kelly Uriostegui                                                                                          [address] [place] [address]   1935  'PCP/Referring Physician'  Provider, No Known  None  No ref. provider found    Chief Complaint   Patient presents with   • Follow-up     1 year follow up with carotid duplex. Patient complains of loss of balance   • Carotid Artery Disease       History of Present Illness:   This is an 83-year-old patient who is status post left carotid endarterectomy with a carotid to subclavian bypass in July, 2017.  At this time she is in good order.  She has no significant arm claudication symptoms.  She has a strongly palpable left radial pulse.  She is here to discuss her carotid duplex findings.  She denies stroke, seizures, TIAs, amaurosis fugax, dysarthria or dysphagia and she states that she has now discontinued smoking.      Patient Active Problem List   Diagnosis   • Essential hypertension   • Coronary artery disease involving native coronary artery of native heart without angina pectoris   • Palpitations   • Transient cerebral ischemia   • Carotid artery stenosis   • Carotid bruit   • Subclavian artery stenosis, left (CMS/HCC)   • Subclavian steal syndrome   • Hyperlipidemia   • Hypertension   • Hypothyroidism   • GERD (gastroesophageal reflux disease)   • Tobacco abuse   • COPD (chronic obstructive pulmonary disease) (CMS/[facility])   • H/O CVA (cerebral vascular accident)   • Bilateral Carotid Artery Stenosis     Past Medical History:   Diagnosis Date   • Anemia    • Anemia    • Carotid stenosis    • Coronary artery disease    • CVA (cerebral vascular accident) (CMS/HCC)    • GERD (gastroesophageal reflux disease)    • Hyperlipidemia    • Hypertension    • Hypothyroidism    • Macular degeneration      Past Surgical History:   Procedure Laterality Date   • CARDIAC CATHETERIZATION     • COLONOSCOPY      5-10 years ago   • EYE SURGERY    "   bilateral cataracts removed   • GALLBLADDER SURGERY     • HYSTERECTOMY     • DE VEIN BYPASS GRAFT,CAROTI-SUBCL Left 7/11/2017    Procedure: LEFT CAROTID ENDARTERCTOMY WITH PATCH, LEFT CAROTID SUBCLAVIAN BYPASS;  Surgeon: Tyler Carranza MD;  Location: [facility];  Service: Vascular   • THYROID SURGERY         Current Outpatient Prescriptions:   •  amLODIPine (NORVASC) 5 MG tablet, Take 1 tablet by mouth Daily. (Patient taking differently: Take 5 mg by mouth Daily. 1/2 tablet daily), Disp: 30 tablet, Rfl: 4  •  aspirin 81 MG EC tablet, Take 1 tablet by mouth daily., Disp: 30 tablet, Rfl: 3  •  atenolol (TENORMIN) 50 MG tablet, Take 1 tablet by mouth Daily. 1/2 tablet daily, Disp: 30 tablet, Rfl: 3  •  atorvastatin (LIPITOR) 40 MG tablet, Take 1 tablet by mouth Every Night., Disp: 30 tablet, Rfl: 0  •  Cholecalciferol (VITAMIN D3) 2000 UNITS tablet, Take 1 tablet by mouth daily., Disp: , Rfl:   •  clopidogrel (PLAVIX) 75 MG tablet, TAKE ONE TABLET BY MOUTH ONCE DAILY, Disp: 30 tablet, Rfl: 3  •  docusate sodium (COLACE) 100 MG capsule, Take 100 mg by mouth 2 (two) times a day., Disp: , Rfl:   •  IRON PO, Take  by mouth 2 (Two) Times a Day. BID on Monday, Wednesday, and Friday, Disp: , Rfl:   •  loratadine (CLARITIN) 10 MG tablet, Take 10 mg by mouth Daily., Disp: , Rfl:   •  Multiple Vitamins-Minerals (ICAPS) capsule, Take 1 capsule by mouth 2 (two) times a day., Disp: , Rfl:   •  Prenatal Vit-Fe Fumarate-FA (PRENATAL VITAMIN PO), Take  by mouth., Disp: , Rfl:   •  raNITIdine (ZANTAC) 150 MG tablet, Take 150 mg by mouth 2 (Two) Times a Day., Disp: , Rfl:   Allergies   Allergen Reactions   • Kiwi Extract      \" chest pressure\"   • Penicillins Rash     Social History     Social History   • Marital status:      Spouse name: N/A   • Number of children: 2   • Years of education: N/A     Occupational History   •       Retired     Social History Main Topics   • Smoking status: Former Smoker     " Packs/day: 0.50     Years: 45.00     Types: Cigarettes   • Smokeless tobacco: Never Used   • Alcohol use No   • Drug use: No   • Sexual activity: Defer     Other Topics Concern   • Not on file     Social History Narrative    Lives in [place], [place] with brother and niece     Family History   Problem Relation Age of Onset   • Stroke Mother    • Hypertension Mother    • Hyperlipidemia Mother    • Heart disease Father    • Other Father         AAA     Review of Systems   Constitution: Negative for chills, fever, malaise/fatigue, night sweats and weight loss.   HENT: Negative for congestion, hearing loss, odynophagia and sore throat.    Eyes: Negative for blurred vision and double vision.   Cardiovascular: Negative for chest pain, claudication, dyspnea on exertion, leg swelling, orthopnea and palpitations.   Respiratory: Negative for cough, hemoptysis and shortness of breath.    Endocrine: Negative for cold intolerance, heat intolerance, polydipsia, polyphagia and polyuria.   Hematologic/Lymphatic: Does not bruise/bleed easily.   Skin: Negative for itching and rash.   Musculoskeletal: Negative for back pain, joint pain, joint swelling, muscle cramps, muscle weakness, myalgias and neck pain.   Gastrointestinal: Negative for abdominal pain, constipation, diarrhea, hematemesis, hematochezia, melena, nausea and vomiting.   Genitourinary: Negative for dysuria, frequency, hematuria, hesitancy, incomplete emptying, nocturia and urgency.   Neurological: Positive for dizziness, light-headedness, loss of balance and paresthesias (in left thumb ). Negative for focal weakness, headaches, numbness and seizures.   Psychiatric/Behavioral: Negative for depression and suicidal ideas. The patient is not nervous/anxious.    All other systems reviewed and are negative.    Vitals:    08/06/18 0849   BP: 160/70   BP Location: Right arm   Patient Position: Sitting   Pulse: 71   Temp: 97.9 °F (36.6 °C)   SpO2: 97%   Weight: 53.3 kg (117 lb 9.6  "oz)   Height: 162.6 cm (64\")      Physical Exam   CONSTITUTIONAL: Alert and conversant, Well dressed, Well nourished, No acute distress  EYES: Sclera clean, Anicteric, Pupils equal  ENT: No nasal deviation, Trachea midline  NECK: No neck masses, Supple  LUNGS: No wheezing, Cough, non-congested  HEART: No rubs, No murmurs  GASTROINTESTINAL: Soft, non-distended, No masses, Non tender  to palpation, normal bowel sounds  NEURO: No motor deficits, No sensory deficits, Cranial Nerves 2 through 12 grossly intact  PSYCHIATRIC: Oriented to person, place and time, No memory deficits, Mood appropriate  VASCULAR: No carotid bruits, Femoral pulses palpable and symmetric  MUSKULOSKELETAL: No extremity trauma or extremity asymmetry    Labs/Imaging:   I reviewed the carotid duplex demonstrating the patent left carotid subclavian bypass.    Assessment/Plan:   This patient is status post left carotid subclavian bypass.  She has no neurologic deficits.  She has a strongly palpable left radial pulse and her carotid duplex scan demonstrates the graft is open and functioning.  It also demonstrates 50-75% right-sided carotid stenosis, which is asymptomatic.  This is quite a large range.  I have discussed these findings with the patient.  At this point we will obtain a repeat carotid duplex scan in 1 year, which she would like to have that performed at [facility].  If the right carotid worsens in anyway, we would probably proceed with a CT angiogram.  She is agreeable to that plan.        Patient Active Problem List   Diagnosis   • Essential hypertension   • Coronary artery disease involving native coronary artery of native heart without angina pectoris   • Palpitations   • Transient cerebral ischemia   • Carotid artery stenosis   • Carotid bruit   • Subclavian artery stenosis, left (CMS/HCC)   • Subclavian steal syndrome   • Hyperlipidemia   • Hypertension   • Hypothyroidism   • GERD (gastroesophageal reflux disease)   • Tobacco " abuse   • COPD (chronic obstructive pulmonary disease) (CMS/[facility])   • H/O CVA (cerebral vascular accident)   • Bilateral Carotid Artery Stenosis     CC: HEBERT Montanez, , editing for Tyler Carranza M.D.    I, Tyler Carranza MD, have read and agree with the editing done by Aurora Allred, .

## 2019-08-28 ENCOUNTER — TELEPHONE (OUTPATIENT)
Dept: CARDIAC SURGERY | Facility: CLINIC | Age: 84
End: 2019-08-28

## (undated) DEVICE — DECANT BG O JET

## (undated) DEVICE — SYR TB SLPTP 1CC NO NDL

## (undated) DEVICE — MEDI-VAC YANKAUER SUCTION HANDLE W/BULBOUS TIP: Brand: CARDINAL HEALTH

## (undated) DEVICE — NDL HYPO ECLPS SFTY 27G 1/2IN

## (undated) DEVICE — PK VASC 10

## (undated) DEVICE — SYR CONTRL LUERLOK 10CC

## (undated) DEVICE — HDRST POSTIN FM CRDL TRACH SLOT 9X8X4IN

## (undated) DEVICE — ANTIBACTERIAL UNDYED BRAIDED (POLYGLACTIN 910), SYNTHETIC ABSORBABLE SUTURE: Brand: COATED VICRYL

## (undated) DEVICE — DRSNG WND GZ PAD BORDERED 4X8IN STRL

## (undated) DEVICE — SENSR O2 OXIMAX FNGR A/ 18IN NONSTR

## (undated) DEVICE — MAGNETIC DRAPE: Brand: DEVON

## (undated) DEVICE — NDL HYPO SFTY PROEDGE 27G 1 1/4IN GRY

## (undated) DEVICE — NDL HYPO ECLPS SFTY 18G 1 1/2IN

## (undated) DEVICE — SUT SILK 3/0 TIES 18IN A184H

## (undated) DEVICE — SAFESECURE,SECUREMENT,FOLEY CATH,STERILE: Brand: MEDLINE

## (undated) DEVICE — SUT PROLN 7/0 BV1 D/A 24IN 8702H

## (undated) DEVICE — CANNULA,ADULT,SOFT-TOUCH,7TUBE,SC: Brand: MEDLINE

## (undated) DEVICE — SUT PROLN 6/0 C1 CARDIO 18IN 8718H

## (undated) DEVICE — INTENDED USE FOR SURGICAL MARKING ON INTACT SKIN, ALSO PROVIDES A PERMANENT METHOD OF IDENTIFYING OBJECTS IN THE OPERATING ROOM: Brand: WRITESITE® REGULAR TIP SKIN MARKER

## (undated) DEVICE — CATHETER,URETHRAL,REDRUBBER,STRL,18FR: Brand: MEDLINE

## (undated) DEVICE — MEDI-VAC NON-CONDUCTIVE SUCTION TUBING: Brand: CARDINAL HEALTH

## (undated) DEVICE — SKIN AFFIX SURG ADHESIVE 72/CS 0.55ML: Brand: MEDLINE

## (undated) DEVICE — 3M™ IOBAN™ 2 ANTIMICROBIAL INCISE DRAPE 6650EZ: Brand: IOBAN™ 2

## (undated) DEVICE — SUT PROLN 6/0 C1 D/A 30IN 8706H

## (undated) DEVICE — SUT SILK 2/0 TIES 18IN A185H

## (undated) DEVICE — TAPE MICROFM 2IN LF

## (undated) DEVICE — ST PRIM GRVTY NDLESS 3 INJ PORT 105IN

## (undated) DEVICE — SUT SILK 4/0 TIES 18IN A183H

## (undated) DEVICE — SPNG GZ WOVN 4X4IN 12PLY 10/BX STRL

## (undated) DEVICE — DRN PENRS 1/4X18IN LTX

## (undated) DEVICE — GRFT VASC PROPAT THNSTRCH REMVRNG8X30X40: Type: IMPLANTABLE DEVICE | Site: CAROTID | Status: NON-FUNCTIONAL

## (undated) DEVICE — AIRWY 90MM NO9